# Patient Record
Sex: MALE | Race: WHITE | Employment: OTHER | URBAN - METROPOLITAN AREA
[De-identification: names, ages, dates, MRNs, and addresses within clinical notes are randomized per-mention and may not be internally consistent; named-entity substitution may affect disease eponyms.]

---

## 2017-01-12 ENCOUNTER — OFFICE VISIT (OUTPATIENT)
Dept: INTERNAL MEDICINE CLINIC | Age: 67
End: 2017-01-12

## 2017-01-12 VITALS
BODY MASS INDEX: 27.35 KG/M2 | OXYGEN SATURATION: 95 % | DIASTOLIC BLOOD PRESSURE: 80 MMHG | TEMPERATURE: 98.7 F | HEIGHT: 70 IN | RESPIRATION RATE: 18 BRPM | SYSTOLIC BLOOD PRESSURE: 136 MMHG | WEIGHT: 191 LBS | HEART RATE: 66 BPM

## 2017-01-12 DIAGNOSIS — G89.29 CHRONIC PAIN OF BOTH ANKLES: ICD-10-CM

## 2017-01-12 DIAGNOSIS — E11.9 CONTROLLED TYPE 2 DIABETES MELLITUS WITHOUT COMPLICATION, WITHOUT LONG-TERM CURRENT USE OF INSULIN (HCC): Primary | ICD-10-CM

## 2017-01-12 DIAGNOSIS — M25.571 CHRONIC PAIN OF BOTH ANKLES: ICD-10-CM

## 2017-01-12 DIAGNOSIS — Z23 NEED FOR SHINGLES VACCINE: ICD-10-CM

## 2017-01-12 DIAGNOSIS — E78.5 HYPERLIPIDEMIA WITH TARGET LDL LESS THAN 100: ICD-10-CM

## 2017-01-12 DIAGNOSIS — M25.572 CHRONIC PAIN OF BOTH ANKLES: ICD-10-CM

## 2017-01-12 NOTE — MR AVS SNAPSHOT
Visit Information Date & Time Provider Department Dept. Phone Encounter #  
 1/12/2017  3:40 PM [facility] [phone] [id number] Follow-up Instructions Return in about 4 months (around 5/12/2017) for Diabetes - Type 2. Upcoming Health Maintenance Date Due  
 MEDICARE YEARLY EXAM 1/5/2017 INFLUENZA AGE 9 TO ADULT 1/26/2017* HEMOGLOBIN A1C Q6M 2/15/2017* MICROALBUMIN Q1 2/15/2017* ZOSTER VACCINE AGE 60> 3/1/2017* FOOT EXAM Q1 6/24/2017 LIPID PANEL Q1 7/6/2017 EYE EXAM RETINAL OR DILATED Q1 9/6/2017 FOBT Q 1 YEAR AGE 50-75 1/12/2018 GLAUCOMA SCREENING Q2Y 9/6/2018 DTaP/Tdap/Td series (2 - Td) 5/21/2025 *Topic was postponed. The date shown is not the original due date. Allergies as of 1/12/2017  Review Complete On: 1/12/2017 By: Stone Gibson LPN Severity Noted Reaction Type Reactions Iodine  11/10/2011    Nausea Only Shrimp Current Immunizations  Reviewed on 1/5/2016 Name Date Influenza Vaccine (Quad) PF 1/5/2016  4:39 PM  
 Pneumococcal Conjugate (PCV-13) 1/27/2015 Pneumococcal Polysaccharide (PPSV-23) 9/24/2015  4:45 PM  
 Tdap 5/21/2015 Not reviewed this visit You Were Diagnosed With   
  
 Codes Comments Controlled type 2 diabetes mellitus without complication, without long-term current use of insulin ([facility]    -  Primary ICD-10-CM: E11.9 ICD-9-CM: 250.00 Hyperlipidemia with target LDL less than 100     ICD-10-CM: E78.5 ICD-9-CM: 272.4 Need for shingles vaccine     ICD-10-CM: G90 ICD-9-CM: V04.89 Chronic pain of both ankles     ICD-10-CM: M25.571, G89.29, M25.572 ICD-9-CM: 719.47, 338.29 Vitals BP Pulse Temp Resp Height(growth percentile) Weight(growth percentile) 136/80 (BP 1 Location: Left arm, BP Patient Position: Sitting) 66 98.7 °F (37.1 °C) (Oral) 18 5' 9.5\" (1.765 m) 191 lb (86.6 kg) SpO2 BMI Smoking Status 95% 27.8 kg/m2 Never Smoker Vitals History BMI and BSA Data Body Mass Index Body Surface Area  
 27.8 kg/m 2 2.06 m 2 Preferred Pharmacy Pharmacy Name Phone [address], [address] Your Updated Medication List  
  
   
This list is accurate as of: 17  4:31 PM.  Always use your most recent med list.  
  
  
  
  
 glucose blood VI test strips strip Commonly known as:  ONETOUCH ULTRA TEST Use to check BS BID. DM E11.9  
  
 metFORMIN 500 mg tablet Commonly known as:  GLUCOPHAGE  
TAKE 2 TABS BY MOUTH TWO (2) TIMES DAILY (WITH MEALS). multivitamin tablet Commonly known as:  ONE A DAY Take 1 Tab by mouth daily. pravastatin 20 mg tablet Commonly known as:  PRAVACHOL Take 1 Tab by mouth nightly. SITagliptin 100 mg tablet Commonly known as:  Arleta Moons Take 1 Tab by mouth daily. varicella zoster vacine live 19,400 unit/0.65 mL Susr injection Commonly known as:  varicella-zoster vacine live 1 Vial by SubCUTAneous route once for 1 dose. Prescriptions Printed Refills  
 varicella zoster vacine live (VARICELLA-ZOSTER VACINE LIVE) 19,400 unit/0.65 mL susr injection 0 Si Vial by SubCUTAneous route once for 1 dose. Class: Print Route: SubCUTAneous We Performed the Following  DIABETES FOOT EXAM [HM7 Custom] REFERRAL TO PODIATRY [REF90 Custom] Comments:  
 Please evaluate patient for bilat ankle discomfort with weight bearing; flat feet; he is Type 2 Diabetic. Follow-up Instructions Return in about 4 months (around 2017) for Diabetes - Type 2. To-Do List   
 02/15/2017 Lab:  HEMOGLOBIN A1C WITH EAG   
  
 02/15/2017 Lab:  METABOLIC PANEL, COMPREHENSIVE   
  
 02/15/2017 Lab:  MICROALBUMIN, UR, RAND W/ MICROALBUMIN/CREA RATIO   
  
 02/15/2017 Lab:  Mil[facility] Referral Information Referral ID Referred By Referred To  
  
 [id number]   
   [address] [place], [address] Visits Status Start Date End Date 10 New Request 1/12/17 1/12/18 If your referral has a status of pending review or denied, additional information will be sent to support the outcome of this decision. Introducing [facility] & HEALTH SERVICES! Dear Srikanth Haas: Thank you for requesting a [employer] account. Our records indicate that you already have an active [employer] account. You can access your account anytime at [email or web address] Did you know that you can access your hospital and ER discharge instructions at any time in [employer]? You can also review all of your test results from your hospital stay or ER visit. Additional Information If you have questions, please visit the Frequently Asked Questions section of the [employer] website at [email or web address] Remember, [employer] is NOT to be used for urgent needs. For medical emergencies, dial 911. Now available from your iPhone and Android! Please provide this summary of care documentation to your next provider. Your primary care clinician is listed as Ruby Tesfaye. If you have any questions after today's visit, please call [phone].

## 2017-01-12 NOTE — PROGRESS NOTES
77 yo male 3 mos overdue for 4 mo DM f/u. He has been on Januvia 100 mg since June of 2016 in addition to 1000 mg Metformin BID. He checks fasting sugars with readings from 120-160. He reports he eats [place] lot\" of nuts. He has flat feet w/ ankle discomfort with weight bearing. He never did go see Podiatry or Orthopedics. He and his wife just moved to a new house, and has't been getting exercise regularly. Sleep is good. Bowel fx is good. No exertional chest discomfort. PE: WNWD WM   Weight is down 3 lbs from last visit   /80   Heart - RRR   Lungs - clear   Diabetic foot exam:     Left: Reflexes 2+ in knee, absent in ankles     Vibratory sensation normal    Proprioception normal   Sharp/dull discrimination normal    Filament test normal sensation with micro filament   Pulse DP: 2+ (normal)   Pulse PT: 1+ (weak)   Deformities: Yes - Bilat prominent medial and lateral malleoli and flat arches  Right: Reflexes 2+ in knee, absent in ankle   Vibratory sensation normal   Proprioception normal   Sharp/dull discrimination normal   Filament test normal sensation with micro filament   Pulse DP: 2+ (normal)   Pulse PT: 1+ (weak)   Deformities: Yes - medial and lateral malleoli are prominent, flat arches    Imp: Type 2 DM   HLD   Bilat ankle/foot pain   Need for shingles vaccine    Plan: Rx for Zostavax   Referral to Podiatry   Order for fasting labs   See Dr. Atha Oppenheim in 4 mos  _____________________  Expected course of current diagnosed problem(s) as well as expected progression and possible complications, and desired follow up with provider are discussed with patient. Patient is encouraged to be back in touch with any questions or concerns. Patient expresses understanding of plan of care. Patient is given AVS which includes diagnoses, current medications, vitals.

## 2017-01-31 ENCOUNTER — OFFICE VISIT (OUTPATIENT)
Dept: INTERNAL MEDICINE CLINIC | Age: 67
End: 2017-01-31

## 2017-01-31 VITALS
SYSTOLIC BLOOD PRESSURE: 140 MMHG | BODY MASS INDEX: 28.48 KG/M2 | HEART RATE: 98 BPM | OXYGEN SATURATION: 100 % | TEMPERATURE: 101.4 F | RESPIRATION RATE: 18 BRPM | HEIGHT: 69 IN | WEIGHT: 192.3 LBS | DIASTOLIC BLOOD PRESSURE: 90 MMHG

## 2017-01-31 DIAGNOSIS — R50.9 FEBRILE RESPIRATORY ILLNESS: Primary | ICD-10-CM

## 2017-01-31 DIAGNOSIS — J98.9 FEBRILE RESPIRATORY ILLNESS: Primary | ICD-10-CM

## 2017-01-31 LAB
QUICKVUE INFLUENZA TEST: NEGATIVE
VALID INTERNAL CONTROL?: YES

## 2017-01-31 NOTE — PROGRESS NOTES
HPI:  Pb Decker is a 78y.o. year old male who returns to clinic today for an acute visit to discuss the problem(s) below:    Here for two days of URI symptoms - exposed to many sick contacts including grandchildren with fever. Reports intermittent mildly productive cough, congestion, fever, malaise. Denies sore throat, ear pain/discharge, sinus / nasal congestion. Prior to Admission medications    Medication Sig Start Date End Date Taking? Authorizing Provider   metFORMIN (GLUCOPHAGE) 500 mg tablet TAKE 2 TABS BY MOUTH TWO (2) TIMES DAILY (WITH MEALS). 12/7/16  Yes Elia Garrido MD   glucose blood VI test strips (ONETOUCH ULTRA TEST) strip Use to check BS BID. DM E11.9 12/7/16  Yes Elia Garrido MD   pravastatin (PRAVACHOL) 20 mg tablet Take 1 Tab by mouth nightly. 12/7/16  Yes Elia Garrido MD   SITagliptin (JANUVIA) 100 mg tablet Take 1 Tab by mouth daily. 12/7/16  Yes Elia Garrido MD   multivitamin (ONE A DAY) tablet Take 1 Tab by mouth daily. Yes Historical Provider          Allergies   Allergen Reactions    Iodine Nausea Only     Shrimp           ROS  See HPI      Physical Exam   Constitutional: He appears well-nourished. He does not appear ill. No distress. HENT:   Right Ear: Tympanic membrane is not erythematous. No middle ear effusion. Left Ear: Tympanic membrane is not erythematous. No middle ear effusion. Nose: No mucosal edema or rhinorrhea. Right sinus exhibits no maxillary sinus tenderness and no frontal sinus tenderness. Left sinus exhibits no maxillary sinus tenderness and no frontal sinus tenderness. Mouth/Throat: No oropharyngeal exudate or posterior oropharyngeal erythema. Neck: Neck supple. Cardiovascular: Normal rate, regular rhythm and normal heart sounds. No murmur heard. Pulmonary/Chest: Effort normal and breath sounds normal. He has no wheezes. He has no rales. Lymphadenopathy:     He has no cervical adenopathy.          Visit Vitals    /90 (BP 1 Location: Right arm, BP Patient Position: Sitting)    Pulse 98    Temp (!) 101.4 °F (38.6 °C) (Oral)    Resp 18    Ht 5' 9\" (1.753 m)    Wt 192 lb 4.8 oz (87.2 kg)    SpO2 100%    BMI 28.4 kg/m2         Assessment & Plan:  Scotty Harrington was seen today for cold symptoms. Diagnoses and all orders for this visit:    Febrile respiratory illness  Negative for flu. Discussed diagnosis & treatment options, most likely viral at this time, reviewed the importance of avoiding unnecessary antibiotic therapy, reviewed which OTC medications to use and avoid, expected time course for resolution & red flags were reviewed with him to RTC or notify me. -     AMB POC RAPID INFLUENZA TEST      Follow-up Disposition:  Return if symptoms worsen or fail to improve. Advised him to call back or return to office if symptoms worsen/change/persist.  Discussed expected course/resolution/complications of diagnosis in detail with patient. Medication risks/benefits/costs/interactions/alternatives discussed with patient. He was given an after visit summary which includes diagnoses, current medications, & vitals. He expressed understanding with the diagnosis and plan.

## 2017-01-31 NOTE — PATIENT INSTRUCTIONS
1. Mucinex (Guaifenesen) plain-Blue Box 600 mg. Take one twice daily with full glass water   Take for 10 days    2. Saline Nasal Spray - use liberally to flush post-nasal area; use as many times a day as desired. Keep spraying with head tilted back until you feel need to swallow    3. Drink lots of fluids (mainly water) to keep mucus thinner    4. If needed, for cough, we recommend Delsym cough syrup    5. Long acting antihistamine (Claritin/Loratadine, Allegra/Fexofenadine or Zyrtec/Ceterizine) is useful if allergy symptoms are also present    6. Decongestants should only be used for about 3 days. After that, they actually contribute to overdrying/thickening of the mucus, and can raise the BP and overstimulate the heart    7.  Steroid nasal spray (Nasacort AQ) - 2 sprays each nostril once daily; use with head in upright position

## 2017-01-31 NOTE — MR AVS SNAPSHOT
Visit Information Date & Time Provider Department Dept. Phone Encounter #  
 1/31/2017  9:00 AM Aura Segura MD [place] [facility] [facility] [phone] [id number] Upcoming Health Maintenance Date Due INFLUENZA AGE 9 TO ADULT 8/1/2016 MEDICARE YEARLY EXAM 1/5/2017 HEMOGLOBIN A1C Q6M 2/15/2017* MICROALBUMIN Q1 2/15/2017* ZOSTER VACCINE AGE 60> 3/1/2017* LIPID PANEL Q1 7/6/2017 EYE EXAM RETINAL OR DILATED Q1 9/6/2017 FOOT EXAM Q1 1/12/2018 FOBT Q 1 YEAR AGE 50-75 1/12/2018 GLAUCOMA SCREENING Q2Y 9/6/2018 DTaP/Tdap/Td series (2 - Td) 5/21/2025 *Topic was postponed. The date shown is not the original due date. Allergies as of 1/31/2017  Review Complete On: 1/31/2017 By: Dang [facility] Severity Noted Reaction Type Reactions Iodine  11/10/2011    Nausea Only Shrimp Current Immunizations  Reviewed on 1/31/2017 Name Date Influenza Vaccine (Quad) PF 1/5/2016  4:39 PM  
 Pneumococcal Conjugate (PCV-13) 1/27/2015 Pneumococcal Polysaccharide (PPSV-23) 9/24/2015  4:45 PM  
 Tdap 5/21/2015 Reviewed by Aura Segura MD on 1/31/2017 at  9:48 AM  
You Were Diagnosed With   
  
 Codes Comments Febrile respiratory illness    -  Primary ICD-10-CM: J98.9, R50.9 ICD-9-CM: 519.9 Vitals BP Pulse Temp Resp Height(growth percentile) Weight(growth percentile) 140/90 (BP 1 Location: Right arm, BP Patient Position: Sitting) 98 (!) 101.4 °F (38.6 °C) (Oral) 18 5' 9\" (1.753 m) 192 lb 4.8 oz (87.2 kg) SpO2 BMI Smoking Status 100% 28.4 kg/m2 Never Smoker Vitals History BMI and BSA Data Body Mass Index Body Surface Area  
 28.4 kg/m 2 2.06 m 2 Preferred Pharmacy Pharmacy Name Phone [address] [facility], [address] [place] [address] Your Updated Medication List  
  
   
This list is accurate as of: 1/31/17  9:51 AM.  Always use your most recent med list.  
  
  
  
  
 glucose blood VI test strips strip Commonly known as:  ONETOUCH ULTRA TEST Use to check BS BID. DM E11.9  
  
 metFORMIN 500 mg tablet Commonly known as:  GLUCOPHAGE  
TAKE 2 TABS BY MOUTH TWO (2) TIMES DAILY (WITH MEALS). multivitamin tablet Commonly known as:  ONE A DAY Take 1 Tab by mouth daily. pravastatin 20 mg tablet Commonly known as:  PRAVACHOL Take 1 Tab by mouth nightly. SITagliptin 100 mg tablet Commonly known as:  Saenz Reid Take 1 Tab by mouth daily. We Performed the Following AMB POC RAPID INFLUENZA TEST [87905 CPT(R)] Patient Instructions 1. Mucinex (Guaifenesen) plain-Blue Box 600 mg. Take one twice daily with full glass water Take for 10 days 2. Saline Nasal Spray - use liberally to flush post-nasal area; use as many times a day as desired. Keep spraying with head tilted back until you feel need to swallow 3. Drink lots of fluids (mainly water) to keep mucus thinner 4. If needed, for cough, we recommend Delsym cough syrup 5. Long acting antihistamine (Claritin/Loratadine, Allegra/Fexofenadine or Zyrtec/Ceterizine) is useful if allergy symptoms are also present 6. Decongestants should only be used for about 3 days. After that, they actually contribute to overdrying/thickening of the mucus, and can raise the BP and overstimulate the heart 7. Steroid nasal spray (Nasacort AQ) - 2 sprays each nostril once daily; use with head in upright position [facility] & HEALTH SERVICES! Dear Faizan Mesa: Thank you for requesting a [employer] account. Our records indicate that you already have an active [employer] account. You can access your account anytime at [email or web address] Did you know that you can access your hospital and ER discharge instructions at any time in [employer]? You can also review all of your test results from your hospital stay or ER visit. Additional Information If you have questions, please visit the Frequently Asked Questions section of the [employer] website at [email or web address] Remember, [employer] is NOT to be used for urgent needs. For medical emergencies, dial 911. Now available from your iPhone and Android! Please provide this summary of care documentation to your next provider. Your primary care clinician is listed as Mary Palomo. If you have any questions after today's visit, please call [phone].

## 2017-01-31 NOTE — PROGRESS NOTES
Shahriar Castellanos is a 78 y.o. male  Chief Complaint   Patient presents with    Cold Symptoms     Fever up to 101.2, chills, boday ache, and cough it started on sunday     1. Have you been to the ER, urgent care clinic since your last visit? Hospitalized since your last visit? No    2. Have you seen or consulted any other health care providers outside of the [address] [place] [address] since your last visit? Include any pap smears or colon screening.  No

## 2017-02-13 ENCOUNTER — OFFICE VISIT (OUTPATIENT)
Dept: INTERNAL MEDICINE CLINIC | Age: 67
End: 2017-02-13

## 2017-02-13 VITALS
RESPIRATION RATE: 20 BRPM | WEIGHT: 191 LBS | DIASTOLIC BLOOD PRESSURE: 82 MMHG | OXYGEN SATURATION: 98 % | HEART RATE: 80 BPM | SYSTOLIC BLOOD PRESSURE: 136 MMHG | TEMPERATURE: 98.1 F | HEIGHT: 69 IN | BODY MASS INDEX: 28.29 KG/M2

## 2017-02-13 DIAGNOSIS — R05.3 PERSISTENT COUGH FOR 3 WEEKS OR LONGER: Primary | ICD-10-CM

## 2017-02-13 RX ORDER — AZITHROMYCIN 250 MG/1
250 TABLET, FILM COATED ORAL SEE ADMIN INSTRUCTIONS
Qty: 6 TAB | Refills: 0 | Status: SHIPPED | OUTPATIENT
Start: 2017-02-13 | End: 2017-02-18

## 2017-02-13 NOTE — PROGRESS NOTES
79 yo male here at the urging of his wife due to continued cough after he was here on  for a URI. Due to his hx of Blastomycosis treated in , he wonders whether this cough may be related. He is producing some brownish as recently as a few days ago especially early AM.  Fever was present at the beginning of the illness only. He has not taken antibiotics during this illness. He is not currently taking any meds for his sxs. He is keeping hydrated. He had his flu shot Michael 15. In late , he was at his mother-in-law's  with lots of exposure to children and lots of people in general.  He became ill shortly thereafter. PE: WNWD WM w/ occasional dry cough   T - 98.1   Phar - diffusely inflammed   TMs - dull   Lungs - clear    Imp: Persistent cough   Hx exposure to sick children    Plan: Z-pack   Mucinex, Saline NS, Hydration  _____________________________  Expected course of current diagnosed problem(s) as well as expected progression and possible complications, and desired follow up with provider are discussed with patient. Patient is encouraged to be back in touch with any questions or concerns. Patient expresses understanding of plan of care. Patient is given AVS which includes diagnoses, current medications, vitals.

## 2017-02-13 NOTE — MR AVS SNAPSHOT
Visit Information Date & Time Provider Department Dept. Phone Encounter #  
 2/13/2017 10:00 AM [facility] [phone] Upcoming Health Maintenance Date Due  
 MEDICARE YEARLY EXAM 1/5/2017 HEMOGLOBIN A1C Q6M 2/15/2017* MICROALBUMIN Q1 2/15/2017* ZOSTER VACCINE AGE 60> 3/1/2017* LIPID PANEL Q1 7/6/2017 EYE EXAM RETINAL OR DILATED Q1 9/6/2017 FOOT EXAM Q1 1/12/2018 FOBT Q 1 YEAR AGE 50-75 1/12/2018 GLAUCOMA SCREENING Q2Y 9/6/2018 DTaP/Tdap/Td series (2 - Td) 5/21/2025 *Topic was postponed. The date shown is not the original due date. Allergies as of 2/13/2017  Review Complete On: 2/13/2017 By: Davey Pan NP Severity Noted Reaction Type Reactions Iodine  11/10/2011    Nausea Only Shrimp Current Immunizations  Reviewed on 1/31/2017 Name Date Influenza Vaccine (Quad) PF 1/5/2016  4:39 PM  
 Pneumococcal Conjugate (PCV-13) 1/27/2015 Pneumococcal Polysaccharide (PPSV-23) 9/24/2015  4:45 PM  
 Tdap 5/21/2015 Not reviewed this visit You Were Diagnosed With   
  
 Codes Comments Persistent cough for 3 weeks or longer    -  Primary ICD-10-CM: R05 ICD-9-CM: 881. 2 Vitals BP Pulse Temp Resp Height(growth percentile) Weight(growth percentile) 136/82 (BP 1 Location: Left arm, BP Patient Position: Sitting) 80 98.1 °F (36.7 °C) (Oral) 20 5' 9\" (1.753 m) 191 lb (86.6 kg) SpO2 BMI Smoking Status 98% 28.21 kg/m2 Never Smoker Vitals History BMI and BSA Data Body Mass Index Body Surface Area  
 28.21 kg/m 2 2.05 m 2 Preferred Pharmacy Pharmacy Name Phone [facility] [facility], [address] [place] [address] Your Updated Medication List  
  
   
This list is accurate as of: 2/13/17 11:20 AM.  Always use your most recent med list.  
  
  
  
  
 azithromycin 250 mg tablet Commonly known as:  Bennetta Plum Take 1 Tab by mouth See Admin Instructions for 5 days. glucose blood VI test strips strip Commonly known as:  ONETOUCH ULTRA TEST Use to check BS BID. DM E11.9  
  
 metFORMIN 500 mg tablet Commonly known as:  GLUCOPHAGE  
TAKE 2 TABS BY MOUTH TWO (2) TIMES DAILY (WITH MEALS). multivitamin tablet Commonly known as:  ONE A DAY Take 1 Tab by mouth daily. pravastatin 20 mg tablet Commonly known as:  PRAVACHOL Take 1 Tab by mouth nightly. SITagliptin 100 mg tablet Commonly known as:  Arleta Moons Take 1 Tab by mouth daily. Prescriptions Sent to Pharmacy Refills  
 azithromycin (ZITHROMAX) 250 mg tablet 0 Sig: Take 1 Tab by mouth See Admin Instructions for 5 days. Class: Normal  
 Pharmacy: [address] [place] [address] Ph #: [phone] Route: Oral  
  
Patient Instructions 1. Mucinex (Guaifenesen) plain-Blue Box 600 mg. Take one twice daily with full glass water Take for 10 days 2. Saline Nasal Spray - use liberally to flush post-nasal area; use as many times a day as desired. Keep spraying with head tilted back until you feel need to swallow 3. Drink lots of fluids (mainly water) to keep mucus thinner 4. If needed, for cough, we recommend Delsym cough syrup 5. Decongestants should only be used for about 3 days. After that, they actually contribute to overdrying/thickening of the mucus, and can raise the BP and overstimulate the heart Introducing [facility] & HEALTH SERVICES! Dear [facility]: Thank you for requesting a [employer] account. Our records indicate that you already have an active [employer] account. You can access your account anytime at [email or web address] Did you know that you can access your hospital and ER discharge instructions at any time in [employer]? You can also review all of your test results from your hospital stay or ER visit. Additional Information If you have questions, please visit the Frequently Asked Questions section of the [employer] website at [email or web address] Remember, [employer] is NOT to be used for urgent needs. For medical emergencies, dial 911. Now available from your iPhone and Android! Please provide this summary of care documentation to your next provider. Your primary care clinician is listed as [address] [place] [address]. If you have any questions after today's visit, please call [phone].

## 2017-02-13 NOTE — PATIENT INSTRUCTIONS
1. Mucinex (Guaifenesen) plain-Blue Box 600 mg. Take one twice daily with full glass water   Take for 10 days    2. Saline Nasal Spray - use liberally to flush post-nasal area; use as many times a day as desired. Keep spraying with head tilted back until you feel need to swallow    3. Drink lots of fluids (mainly water) to keep mucus thinner    4. If needed, for cough, we recommend Delsym cough syrup    5. Decongestants should only be used for about 3 days.   After that, they actually contribute to overdrying/thickening of the mucus, and can raise the BP and overstimulate the heart

## 2017-02-15 DIAGNOSIS — E78.5 HYPERLIPIDEMIA WITH TARGET LDL LESS THAN 100: ICD-10-CM

## 2017-02-15 DIAGNOSIS — E11.9 CONTROLLED TYPE 2 DIABETES MELLITUS WITHOUT COMPLICATION, WITHOUT LONG-TERM CURRENT USE OF INSULIN (HCC): ICD-10-CM

## 2017-04-06 LAB
ALBUMIN SERPL-MCNC: 4.7 G/DL (ref 3.6–4.8)
ALBUMIN/CREAT UR: 3.9 MG/G CREAT (ref 0–30)
ALBUMIN/GLOB SERPL: 2.1 {RATIO} (ref 1.2–2.2)
ALP SERPL-CCNC: 63 IU/L (ref 39–117)
ALT SERPL-CCNC: 19 IU/L (ref 0–44)
AST SERPL-CCNC: 18 IU/L (ref 0–40)
BILIRUB SERPL-MCNC: 0.6 MG/DL (ref 0–1.2)
BUN SERPL-MCNC: 11 MG/DL (ref 8–27)
BUN/CREAT SERPL: 13 (ref 10–24)
CALCIUM SERPL-MCNC: 9.8 MG/DL (ref 8.6–10.2)
CHLORIDE SERPL-SCNC: 100 MMOL/L (ref 96–106)
CHOLEST SERPL-MCNC: 166 MG/DL (ref 100–199)
CO2 SERPL-SCNC: 24 MMOL/L (ref 18–29)
CREAT SERPL-MCNC: 0.82 MG/DL (ref 0.76–1.27)
CREAT UR-MCNC: 222.6 MG/DL
EST. AVERAGE GLUCOSE BLD GHB EST-MCNC: 134 MG/DL
GLOBULIN SER CALC-MCNC: 2.2 G/DL (ref 1.5–4.5)
GLUCOSE SERPL-MCNC: 175 MG/DL (ref 65–99)
HBA1C MFR BLD: 6.3 % (ref 4.8–5.6)
HDL SERPL-SCNC: 29.7 UMOL/L
HDLC SERPL-MCNC: 46 MG/DL
INTERPRETATION, 910389: NORMAL
LDL SERPL QN: 20.7 NM
LDL SERPL-SCNC: 1097 NMOL/L
LDL SMALL SERPL-SCNC: 443 NMOL/L
LDLC SERPL CALC-MCNC: 100 MG/DL (ref 0–99)
LP-IR SCORE SERPL: 33
Lab: NORMAL
MICROALBUMIN UR-MCNC: 8.6 UG/ML
POTASSIUM SERPL-SCNC: 4.6 MMOL/L (ref 3.5–5.2)
PROT SERPL-MCNC: 6.9 G/DL (ref 6–8.5)
SODIUM SERPL-SCNC: 142 MMOL/L (ref 134–144)
TRIGL SERPL-MCNC: 102 MG/DL (ref 0–149)

## 2017-06-30 NOTE — TELEPHONE ENCOUNTER
Requested Prescriptions     Pending Prescriptions Disp Refills    metFORMIN (GLUCOPHAGE) 500 mg tablet 360 Tab 1     Sig: TAKE 2 TABS BY MOUTH TWO (2) TIMES DAILY (WITH MEALS).  SITagliptin (JANUVIA) 100 mg tablet 90 Tab 1     Sig: Take 1 Tab by mouth daily.      Patient now uses [facility] for his prescription drug coverage

## 2017-07-03 RX ORDER — METFORMIN HYDROCHLORIDE 500 MG/1
TABLET ORAL
Qty: 360 TAB | Refills: 0 | Status: SHIPPED | OUTPATIENT
Start: 2017-07-03 | End: 2017-07-11 | Stop reason: SDUPTHER

## 2017-07-03 NOTE — TELEPHONE ENCOUNTER
Last office visit 2/13/17  No upcoming appointments on file.  Left voicemail message requesting a call back to the office to schedule appointment to re establish care

## 2017-07-11 ENCOUNTER — TELEPHONE (OUTPATIENT)
Dept: INTERNAL MEDICINE CLINIC | Age: 67
End: 2017-07-11

## 2017-07-11 RX ORDER — METFORMIN HYDROCHLORIDE 500 MG/1
TABLET ORAL
Qty: 120 TAB | Refills: 0 | Status: SHIPPED | OUTPATIENT
Start: 2017-07-11 | End: 2017-07-12 | Stop reason: SDUPTHER

## 2017-07-11 NOTE — TELEPHONE ENCOUNTER
Pt called stating that he has not received his mail order and it will take about another 10 days for them to send it. Pt would like a 2 week supply of MetFormin and Januvia sent to [facility] on file.  Pt can be reached at [address]

## 2017-07-11 NOTE — TELEPHONE ENCOUNTER
Patient states he needs a 10 day supply of metformin and januvia, until he receives his mail order. Please send to [address] on file.

## 2017-07-12 ENCOUNTER — TELEPHONE (OUTPATIENT)
Dept: INTERNAL MEDICINE CLINIC | Age: 67
End: 2017-07-12

## 2017-07-12 RX ORDER — METFORMIN HYDROCHLORIDE 500 MG/1
TABLET ORAL
Qty: 40 TAB | Refills: 0 | OUTPATIENT
Start: 2017-07-12 | End: 2017-09-25 | Stop reason: SDUPTHER

## 2017-07-12 NOTE — TELEPHONE ENCOUNTER
Patient called earlier today to get a 10 day prescription for his metformin and [place] [facility] [place]. He is waiting for his mail order meds to arrive.  [facility] still does not have scripts. Orders Placed This Encounter    metFORMIN (GLUCOPHAGE) 500 mg tablet     Sig: TAKE 2 TABS BY MOUTH TWO (2) TIMES DAILY (WITH MEALS). need to establish with new primary care provider for further refills     Dispense:  40 Tab     Refill:  0    SITagliptin (JANUVIA) 100 mg tablet     Sig: Take 1 Tab by mouth daily.  Need to establish with new primary care provider for further refills     Dispense:  10 Tab     Refill:  0      Both called to his pharmacy

## 2017-09-25 ENCOUNTER — OFFICE VISIT (OUTPATIENT)
Dept: INTERNAL MEDICINE CLINIC | Age: 67
End: 2017-09-25

## 2017-09-25 VITALS
HEIGHT: 69 IN | SYSTOLIC BLOOD PRESSURE: 120 MMHG | BODY MASS INDEX: 28.29 KG/M2 | WEIGHT: 191 LBS | TEMPERATURE: 98.5 F | RESPIRATION RATE: 18 BRPM | HEART RATE: 70 BPM | DIASTOLIC BLOOD PRESSURE: 70 MMHG | OXYGEN SATURATION: 96 %

## 2017-09-25 DIAGNOSIS — Z12.5 PROSTATE CANCER SCREENING: ICD-10-CM

## 2017-09-25 DIAGNOSIS — E11.9 CONTROLLED TYPE 2 DIABETES MELLITUS WITHOUT COMPLICATION, WITHOUT LONG-TERM CURRENT USE OF INSULIN (HCC): Primary | ICD-10-CM

## 2017-09-25 DIAGNOSIS — Z00.00 WELCOME TO MEDICARE PREVENTIVE VISIT: ICD-10-CM

## 2017-09-25 DIAGNOSIS — Z13.31 SCREENING FOR DEPRESSION: ICD-10-CM

## 2017-09-25 DIAGNOSIS — E78.5 HYPERLIPIDEMIA WITH TARGET LDL LESS THAN 100: ICD-10-CM

## 2017-09-25 DIAGNOSIS — E78.00 PURE HYPERCHOLESTEROLEMIA: ICD-10-CM

## 2017-09-25 DIAGNOSIS — Z23 ENCOUNTER FOR IMMUNIZATION: ICD-10-CM

## 2017-09-25 DIAGNOSIS — Z86.19 H/O BLASTOMYCOSIS: ICD-10-CM

## 2017-09-25 DIAGNOSIS — Z13.6 SCREENING FOR ABDOMINAL AORTIC ANEURYSM: ICD-10-CM

## 2017-09-25 RX ORDER — METFORMIN HYDROCHLORIDE 500 MG/1
TABLET ORAL
Qty: 180 TAB | Refills: 3 | Status: SHIPPED | OUTPATIENT
Start: 2017-09-25 | End: 2018-04-13 | Stop reason: SDUPTHER

## 2017-09-25 RX ORDER — OMEPRAZOLE 40 MG/1
40 CAPSULE, DELAYED RELEASE ORAL AS NEEDED
Refills: 0 | COMMUNITY
Start: 2017-09-06 | End: 2018-05-28

## 2017-09-25 NOTE — PATIENT INSTRUCTIONS
Vaccine Information Statement    Influenza (Flu) Vaccine (Inactivated or Recombinant): What you need to know    Many Vaccine Information Statements are available in Bulgarian and other languages. See www.immunize.org/vis  Hojas de Información Sobre Vacunas están disponibles en Español y en muchos otros idiomas. Visite www.immunize.org/vis    1. Why get vaccinated? Influenza (flu) is a contagious disease that spreads around the [place] every year, usually between October and May. Flu is caused by influenza viruses, and is spread mainly by coughing, sneezing, and close contact. Anyone can get flu. Flu strikes suddenly and can last several days. Symptoms vary by age, but can include:   fever/chills   sore throat   muscle aches   fatigue   cough   headache    runny or stuffy nose    Flu can also lead to pneumonia and blood infections, and cause diarrhea and seizures in children. If you have a medical condition, such as heart or lung disease, flu can make it worse. Flu is more dangerous for some people. Infants and young children, people 72years of age and older, pregnant women, and people with certain health conditions or a weakened immune system are at greatest risk. Each year thousands of people in the [facility] die from flu, and many more are hospitalized. Flu vaccine can:   keep you from getting flu,   make flu less severe if you do get it, and   keep you from spreading flu to your family and other people. 2. Inactivated and recombinant flu vaccines    A dose of flu vaccine is recommended every flu season. Children 6 months through 6years of age may need two doses during the same flu season. Everyone else needs only one dose each flu season.        Some inactivated flu vaccines contain a very small amount of a mercury-based preservative called thimerosal. Studies have not shown thimerosal in vaccines to be harmful, but flu vaccines that do not contain thimerosal are available. There is no live flu virus in flu shots. They cannot cause the flu. There are many flu viruses, and they are always changing. Each year a new flu vaccine is made to protect against three or four viruses that are likely to cause disease in the upcoming flu season. But even when the vaccine doesnt exactly match these viruses, it may still provide some protection    Flu vaccine cannot prevent:   flu that is caused by a virus not covered by the vaccine, or   illnesses that look like flu but are not. It takes about 2 weeks for protection to develop after vaccination, and protection lasts through the flu season. 3. Some people should not get this vaccine    Tell the person who is giving you the vaccine:     If you have any severe, life-threatening allergies. If you ever had a life-threatening allergic reaction after a dose of flu vaccine, or have a severe allergy to any part of this vaccine, you may be advised not to get vaccinated. Most, but not all, types of flu vaccine contain a small amount of egg protein.  If you ever had Guillain-Barré Syndrome (also called GBS). Some people with a history of GBS should not get this vaccine. This should be discussed with your doctor.  If you are not feeling well. It is usually okay to get flu vaccine when you have a mild illness, but you might be asked to come back when you feel better. 4. Risks of a vaccine reaction    With any medicine, including vaccines, there is a chance of reactions. These are usually mild and go away on their own, but serious reactions are also possible. Most people who get a flu shot do not have any problems with it.      Minor problems following a flu shot include:    soreness, redness, or swelling where the shot was given     hoarseness   sore, red or itchy eyes   cough   fever   aches   headache   itching   fatigue  If these problems occur, they usually begin soon after the shot and last 1 or 2 days. More serious problems following a flu shot can include the following:     There may be a small increased risk of Guillain-Barré Syndrome (GBS) after inactivated flu vaccine. This risk has been estimated at 1 or 2 additional cases per million people vaccinated. This is much lower than the risk of severe complications from flu, which can be prevented by flu vaccine.  Young children who get the flu shot along with pneumococcal vaccine (PCV13) and/or DTaP vaccine at the same time might be slightly more likely to have a seizure caused by fever. Ask your doctor for more information. Tell your doctor if a child who is getting flu vaccine has ever had a seizure. Problems that could happen after any injected vaccine:      People sometimes faint after a medical procedure, including vaccination. Sitting or lying down for about 15 minutes can help prevent fainting, and injuries caused by a fall. Tell your doctor if you feel dizzy, or have vision changes or ringing in the ears.  Some people get severe pain in the shoulder and have difficulty moving the arm where a shot was given. This happens very rarely.  Any medication can cause a severe allergic reaction. Such reactions from a vaccine are very rare, estimated at about 1 in a million doses, and would happen within a few minutes to a few hours after the vaccination. As with any medicine, there is a very remote chance of a vaccine causing a serious injury or death. The safety of vaccines is always being monitored. For more information, visit: www.cdc.gov/vaccinesafety/    5. What if there is a serious reaction? What should I look for?  Look for anything that concerns you, such as signs of a severe allergic reaction, very high fever, or unusual behavior.     Signs of a severe allergic reaction can include hives, swelling of the face and throat, difficulty breathing, a fast heartbeat, dizziness, and weakness  usually within a few minutes to a few hours after the vaccination. What should I do?  If you think it is a severe allergic reaction or other emergency that cant wait, call 9-1-1 and get the person to the nearest hospital. Otherwise, call your doctor.  Reactions should be reported to the Vaccine Adverse Event Reporting System (VAERS). Your doctor should file this report, or you can do it yourself through  the VAERS web site at www.vaers. [facility].gov, or by calling 0-645.805.2432. VAERS does not give medical advice. 6. The National Vaccine Injury Compensation Program    The [facility] Vaccine Injury Compensation Program (VICP) is a federal program that was created to compensate people who may have been injured by certain vaccines. Persons who believe they may have been injured by a vaccine can learn about the program and about filing a claim by calling 7-249.339.9817 or visiting the [employer] website at www.[facility].gov/vaccinecompensation. There is a time limit to file a claim for compensation. 7. How can I learn more?  Ask your healthcare provider. He or she can give you the vaccine package insert or suggest other sources of information.  Call your local or state health department.  Contact the Centers for Disease Control and Prevention (CDC):  - Call 0-607.404.8129 (1-800-CDC-INFO) or  - Visit CDCs website at www.cdc.gov/flu    Vaccine Information Statement   Inactivated Influenza Vaccine   8/7/2015  42 NORBERTO Moore 113EH-30    Department of Health and Human Services  Centers for Disease Control and Prevention    Office Use Only

## 2017-09-25 NOTE — PROGRESS NOTES
Chief Complaint   Patient presents with    Welcome To Medicare    Establish Care        1. Have you been to the ER, urgent care clinic since your last visit? No  Hospitalized since your last visit? No    2. Have you seen or consulted any other health care providers outside of the [address] [place] [address] since your last visit? No Include any pap smears or colon screening.  No

## 2017-09-26 ENCOUNTER — HOSPITAL ENCOUNTER (OUTPATIENT)
Dept: GENERAL RADIOLOGY | Age: 67
Discharge: HOME | End: 2017-09-26
Attending: INTERNAL MEDICINE
Payer: MEDICARE

## 2017-09-26 DIAGNOSIS — Z86.19 H/O BLASTOMYCOSIS: ICD-10-CM

## 2017-09-26 PROCEDURE — 71020 XR CHEST PA LAT: CPT

## 2017-09-26 NOTE — PROGRESS NOTES
The Xray was normal.  There was some granulomas which were likely the scars from your previous infection in the right mid lung.   Message sent to patient via FightMe:  September 26, 2017

## 2017-10-02 ENCOUNTER — HOSPITAL ENCOUNTER (OUTPATIENT)
Dept: VASCULAR SURGERY | Age: 67
Discharge: HOME | End: 2017-10-02
Attending: INTERNAL MEDICINE
Payer: MEDICARE

## 2017-10-02 ENCOUNTER — HOSPITAL ENCOUNTER (OUTPATIENT)
Dept: ULTRASOUND IMAGING | Age: 67
Discharge: HOME | End: 2017-10-02
Attending: INTERNAL MEDICINE
Payer: MEDICARE

## 2017-10-02 DIAGNOSIS — Z13.6 SCREENING FOR ABDOMINAL AORTIC ANEURYSM: ICD-10-CM

## 2017-10-02 DIAGNOSIS — E78.5 HYPERLIPIDEMIA WITH TARGET LDL LESS THAN 100: ICD-10-CM

## 2017-10-02 DIAGNOSIS — E11.9 CONTROLLED TYPE 2 DIABETES MELLITUS WITHOUT COMPLICATION, WITHOUT LONG-TERM CURRENT USE OF INSULIN (HCC): ICD-10-CM

## 2017-10-02 PROCEDURE — 93880 EXTRACRANIAL BILAT STUDY: CPT

## 2017-10-02 PROCEDURE — 76706 US ABDL AORTA SCREEN AAA: CPT

## 2017-10-02 NOTE — PROCEDURES
[facility]  *** FINAL REPORT ***    Name: Carmen Orantes  MRN: [id number]    Outpatient  : 07 Oct 1950  HIS Order #: [id number]  [address] [facility] Visit #: [id number]  Date: 02 Oct 2017    TYPE OF TEST: Cerebrovascular Duplex    REASON FOR TEST  Carotid bruit    Right Carotid:-             Proximal               Mid                 Distal  cm/s  Systolic  Diastolic  Systolic  Diastolic  Systolic  Diastolic  CCA:     27.0      19.0                            64.0      19.0  Bulb:  ECA:     99.0      18.0  ICA:     87.0      30.0                            68.0      29.0  ICA/CCA:  1.4       1.6    ICA Stenosis:    Right Vertebral:-  Finding: Antegrade  Sys:       39.0  Sri:       14.0    Right Subclavian:    Left Carotid:-            Proximal                Mid                 Distal  cm/s  Systolic  Diastolic  Systolic  Diastolic  Systolic  Diastolic  CCA:     14.1      19.0                            56.0      16.0  Bulb:  ECA:     85.0      14.0  ICA:     42.0      18.0                            65.0      29.0  ICA/CCA:  0.8       1.1    ICA Stenosis:    Left Vertebral:-  Finding: Antegrade  Sys:       48.0  Sri:       16.0    Left Subclavian:    INTERPRETATION/FINDINGS  PROCEDURE:  Color duplex ultrasound imaging of extracranial  cerebrovascular arteries. FINDINGS:       Right:  Internal carotid velocity is within normal limits. There   is narrowing of the internal carotid flow channel on color Doppler  imaging and  non-calcific plaque on B-mode imaging, consistent with  less than 50 percent stenosis (lower portion of the 0 to 49 percent  range). The common and external carotid arteries are patent and  without evidence of hemodynamically significant stenosis. Left:  Internal carotid velocity is within normal limits.   There  is narrowing of the internal carotid flow channel on color Doppler  imaging and  non-calcific plaque on B-mode imaging, consistent with  less than 50 percent stenosis (lower portion of the 0 to 49 percent  range). The common and external carotid arteries are patent and  without evidence of hemodynamically significant stenosis. IMPRESSION:  Consistent with less than 50% stenosis of the right  internal carotid and less than 50% stenosis of the left internal  carotid. Vertebrals are patent with antegrade flow. ADDITIONAL COMMENTS    I have personally reviewed the data relevant to the interpretation of  this  study. TECHNOLOGIST: Celsa Damon.  Narinder Fernando  Signed: 10/02/2017 09:00 AM    PHYSICIAN: Adeline Birch MD  Signed: 10/03/2017 07:04 AM

## 2017-10-03 NOTE — PROGRESS NOTES
There was normal blood flow for your carotid arteries. There was some plaque but minimal.  However at our next visit, we should consider adding a statin since you are a diabetic. If you are not on an aspirin, please start a baby aspirin 81 mg once a day in the morning with food.   Message sent to patient via Tylr Mobile:  October 3, 2017

## 2017-10-16 ENCOUNTER — HOSPITAL ENCOUNTER (OUTPATIENT)
Dept: LAB | Age: 67
Discharge: HOME | End: 2017-10-16
Payer: MEDICARE

## 2017-10-16 PROCEDURE — 80061 LIPID PANEL: CPT

## 2017-10-16 PROCEDURE — 83036 HEMOGLOBIN GLYCOSYLATED A1C: CPT

## 2017-10-16 PROCEDURE — 84153 ASSAY OF PSA TOTAL: CPT

## 2017-10-16 PROCEDURE — 85025 COMPLETE CBC W/AUTO DIFF WBC: CPT

## 2017-10-16 PROCEDURE — 82043 UR ALBUMIN QUANTITATIVE: CPT

## 2017-10-16 PROCEDURE — 36415 COLL VENOUS BLD VENIPUNCTURE: CPT

## 2017-10-16 PROCEDURE — 80053 COMPREHEN METABOLIC PANEL: CPT

## 2017-10-16 PROCEDURE — 81001 URINALYSIS AUTO W/SCOPE: CPT

## 2017-10-16 PROCEDURE — 84443 ASSAY THYROID STIM HORMONE: CPT

## 2017-10-17 LAB
ALBUMIN SERPL-MCNC: 4.5 G/DL (ref 3.6–4.8)
ALBUMIN/CREAT UR: 2.6 MG/G CREAT (ref 0–30)
ALBUMIN/GLOB SERPL: 1.7 {RATIO} (ref 1.2–2.2)
ALP SERPL-CCNC: 70 IU/L (ref 39–117)
ALT SERPL-CCNC: 18 IU/L (ref 0–44)
APPEARANCE UR: CLEAR
AST SERPL-CCNC: 17 IU/L (ref 0–40)
BACTERIA #/AREA URNS HPF: NORMAL /[HPF]
BASOPHILS # BLD AUTO: 0.1 X10E3/UL (ref 0–0.2)
BASOPHILS NFR BLD AUTO: 1 %
BILIRUB SERPL-MCNC: 0.3 MG/DL (ref 0–1.2)
BILIRUB UR QL STRIP: NEGATIVE
BUN SERPL-MCNC: 17 MG/DL (ref 8–27)
BUN/CREAT SERPL: 22 (ref 10–24)
CALCIUM SERPL-MCNC: 9.7 MG/DL (ref 8.6–10.2)
CASTS URNS QL MICRO: NORMAL /LPF
CHLORIDE SERPL-SCNC: 99 MMOL/L (ref 96–106)
CHOLEST SERPL-MCNC: 172 MG/DL (ref 100–199)
CO2 SERPL-SCNC: 25 MMOL/L (ref 18–29)
COLOR UR: YELLOW
CREAT SERPL-MCNC: 0.79 MG/DL (ref 0.76–1.27)
CREAT UR-MCNC: 156 MG/DL
EOSINOPHIL # BLD AUTO: 0.3 X10E3/UL (ref 0–0.4)
EOSINOPHIL NFR BLD AUTO: 5 %
EPI CELLS #/AREA URNS HPF: NORMAL /HPF
ERYTHROCYTE [DISTWIDTH] IN BLOOD BY AUTOMATED COUNT: 12.6 % (ref 12.3–15.4)
EST. AVERAGE GLUCOSE BLD GHB EST-MCNC: 140 MG/DL
GLOBULIN SER CALC-MCNC: 2.6 G/DL (ref 1.5–4.5)
GLUCOSE SERPL-MCNC: 175 MG/DL (ref 65–99)
GLUCOSE UR QL: NEGATIVE
HBA1C MFR BLD: 6.5 % (ref 4.8–5.6)
HCT VFR BLD AUTO: 44.5 % (ref 37.5–51)
HDLC SERPL-MCNC: 43 MG/DL
HGB BLD-MCNC: 15.3 G/DL (ref 12.6–17.7)
HGB UR QL STRIP: NEGATIVE
IMM GRANULOCYTES # BLD: 0 X10E3/UL (ref 0–0.1)
IMM GRANULOCYTES NFR BLD: 0 %
INTERPRETATION, 910389: NORMAL
KETONES UR QL STRIP: NEGATIVE
LDLC SERPL CALC-MCNC: 111 MG/DL (ref 0–99)
LEUKOCYTE ESTERASE UR QL STRIP: NEGATIVE
LYMPHOCYTES # BLD AUTO: 1.2 X10E3/UL (ref 0.7–3.1)
LYMPHOCYTES NFR BLD AUTO: 19 %
Lab: NORMAL
MCH RBC QN AUTO: 31.8 PG (ref 26.6–33)
MCHC RBC AUTO-ENTMCNC: 34.4 G/DL (ref 31.5–35.7)
MCV RBC AUTO: 93 FL (ref 79–97)
MICRO URNS: NORMAL
MICRO URNS: NORMAL
MICROALBUMIN UR-MCNC: 4 UG/ML
MONOCYTES # BLD AUTO: 0.4 X10E3/UL (ref 0.1–0.9)
MONOCYTES NFR BLD AUTO: 7 %
MUCOUS THREADS URNS QL MICRO: PRESENT
NEUTROPHILS # BLD AUTO: 4.3 X10E3/UL (ref 1.4–7)
NEUTROPHILS NFR BLD AUTO: 68 %
NITRITE UR QL STRIP: NEGATIVE
PH UR STRIP: 6 [PH] (ref 5–7.5)
PLATELET # BLD AUTO: 210 X10E3/UL (ref 150–379)
POTASSIUM SERPL-SCNC: 4.3 MMOL/L (ref 3.5–5.2)
PROT SERPL-MCNC: 7.1 G/DL (ref 6–8.5)
PROT UR QL STRIP: NEGATIVE
PSA SERPL-MCNC: 1.2 NG/ML (ref 0–4)
RBC # BLD AUTO: 4.81 X10E6/UL (ref 4.14–5.8)
RBC #/AREA URNS HPF: NORMAL /HPF
SODIUM SERPL-SCNC: 139 MMOL/L (ref 134–144)
SP GR UR: 1.02 (ref 1–1.03)
TRIGL SERPL-MCNC: 90 MG/DL (ref 0–149)
TSH SERPL DL<=0.005 MIU/L-ACNC: 1.52 UIU/ML (ref 0.45–4.5)
URINALYSIS REFLEX, 377202: NORMAL
UROBILINOGEN UR STRIP-MCNC: 0.2 MG/DL (ref 0.2–1)
VLDLC SERPL CALC-MCNC: 18 MG/DL (ref 5–40)
WBC # BLD AUTO: 6.4 X10E3/UL (ref 3.4–10.8)
WBC #/AREA URNS HPF: NORMAL /HPF

## 2017-10-18 NOTE — PROGRESS NOTES
The cholesterol is a little high. Are you taking the pravachol 20 mg now? We need to increase your dose a little to 40 mg since your carotid shows some plaque disease. Let me know and I will send it in to your pharmacy.   Message sent to patient via Clipboard:  October 17, 2017

## 2018-02-21 NOTE — TELEPHONE ENCOUNTER
Last office visit- 9/225/17  Next office visit- 4/5/18  Last refill-    Requested Prescriptions     Pending Prescriptions Disp Refills    glucose blood VI test strips (ONETOUCH ULTRA TEST) strip 200 Strip 1     Sig: Use to check BS BID.  DM E11.9

## 2018-04-10 ENCOUNTER — OFFICE VISIT (OUTPATIENT)
Dept: INTERNAL MEDICINE CLINIC | Age: 68
End: 2018-04-10

## 2018-04-10 DIAGNOSIS — E11.9 CONTROLLED TYPE 2 DIABETES MELLITUS WITHOUT COMPLICATION, WITHOUT LONG-TERM CURRENT USE OF INSULIN (HCC): Primary | ICD-10-CM

## 2018-04-10 DIAGNOSIS — R03.0 BORDERLINE SYSTOLIC HTN: ICD-10-CM

## 2018-04-10 DIAGNOSIS — E78.5 HYPERLIPIDEMIA WITH TARGET LDL LESS THAN 100: ICD-10-CM

## 2018-04-10 RX ORDER — ASPIRIN 325 MG
325 TABLET, DELAYED RELEASE (ENTERIC COATED) ORAL
COMMUNITY
Start: 2017-10-09 | End: 2018-05-28

## 2018-04-10 RX ORDER — PRAVASTATIN SODIUM 20 MG/1
1 TABLET ORAL
COMMUNITY
Start: 2016-12-07 | End: 2018-05-21 | Stop reason: SDUPTHER

## 2018-04-10 NOTE — PROGRESS NOTES
Diabetes (6 month follow up)       HPI:  Braxton Gonzalez is a 75y.o. year old male who is here for a follow up visit. He was last seen by me on Visit date not found. He reports the following:    [facility]- ankle surgery  Getting PT    Taking pravachol 20 mg. On 325 mg aspirin daily from ankle surgery- will switch to baby aspirin 81 mg later. Was not able to do labs- will get blood sugar checked. No readings today    GERD    Patient has history of acid reflux. Tolerates medications and tries to use PRN. He denies acid related chest pain, early satiety, difficulty swallowing, or unexpected weight loss. Assessment and Plan        1. Controlled type 2 diabetes mellitus without complication, without long-term current use of insulin ([facility])  Check a1c today. Pt compliant with diet. - pravastatin (PRAVACHOL) 20 mg tablet; Take 1 Tab by mouth.  - aspirin delayed-release 325 mg tablet; Take 325 mg by mouth.  - CBC WITH AUTOMATED DIFF  - LIPID PANEL  - METABOLIC PANEL, COMPREHENSIVE  - HEMOGLOBIN A1C WITH EAG    2. Hyperlipidemia with target LDL less than 100  The nature of cardiac risk has been fully discussed with this patient. I have made him aware of his LDL target goal given his cardiovascular risk analysis. I have discussed the appropriate diet. The need for lifelong compliance in order to reduce risk is stressed. A regular exercise program is recommended to help achieve and maintain normal body weight, fitness and improve lipid balance. The risks and benefits of medications were discussed. Last cholesterol labs reviewed with patient. Patient made aware to get liver checked every 6 months. Continue with  pravastatin    3. Borderline systolic HTN  Get BP machine. New diagnosis today. Took a lot of NSAIDS.           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  aspirin delayed-release 325 mg tablet Take 325 mg by mouth.  glucose blood VI test strips (ONETOUCH ULTRA TEST) strip Use to check BS BID. DM E11.9 200 Strip 1    SITagliptin (JANUVIA) 100 mg tablet Take 1 Tab by mouth daily. 90 Tab 3    metFORMIN (GLUCOPHAGE) 500 mg tablet TAKE 2 TABS BY MOUTH TWO (2) TIMES DAILY (WITH MEALS) 180 Tab 3    multivitamin (ONE A DAY) tablet Take 1 Tab by mouth daily.  omeprazole (PRILOSEC) 40 mg capsule Take 40 mg by mouth daily. 0     No facility-administered encounter medications on file as of 4/10/2018. Allergies   Allergen Reactions    Iodine Nausea Only     Shrimp        Social History     Social History    Marital status:      Spouse name: N/A    Number of children: N/A    Years of education: N/A     Occupational History    defense [employer]     Social History Main Topics    Smoking status: Never Smoker    Smokeless tobacco: Never Used    Alcohol use 0.0 oz/week      Comment: rare to occasional    Drug use: No    Sexual activity: Yes     Partners: Female     Other Topics Concern    Not on file     Social History Narrative        family history includes Cancer in his father; Dementia in his mother; Diabetes in his father and mother; Psychiatric Disorder in his mother; Stroke in his father. Review of Systems   Constitutional: Negative for weight loss. Eyes: Negative for blurred vision. Respiratory: Negative for shortness of breath. Cardiovascular: Negative for chest pain. Gastrointestinal: Negative for abdominal pain. Genitourinary: Negative for dysuria and frequency. Skin: Negative for rash. Neurological: Negative for dizziness, focal weakness, weakness and headaches. Endo/Heme/Allergies: Negative for environmental allergies. Does not bruise/bleed easily. Physical Exam   Constitutional: He appears well-developed and well-nourished. He is active. Non-toxic appearance. He does not have a sickly appearance. He does not appear ill. No distress. Eyes: Conjunctivae are normal. Right eye exhibits no discharge. Cardiovascular: Normal rate, regular rhythm, S1 normal, S2 normal, normal heart sounds and normal pulses. Exam reveals no gallop and no friction rub. Pulmonary/Chest: Effort normal and breath sounds normal. No respiratory distress. Abdominal: Soft. Bowel sounds are normal.   Musculoskeletal: He exhibits no edema or deformity. Neurological: He is alert. Skin: Skin is warm and dry. No rash noted. No pallor. Psychiatric: He has a normal mood and affect. His behavior is normal.   Vitals reviewed. Ortho Exam      Orders Placed This Encounter    CBC WITH AUTOMATED DIFF    LIPID PANEL    METABOLIC PANEL, COMPREHENSIVE    HEMOGLOBIN A1C WITH EAG    pravastatin (PRAVACHOL) 20 mg tablet     Sig: Take 1 Tab by mouth.  aspirin delayed-release 325 mg tablet     Sig: Take 325 mg by mouth. I have reviewed the patient's medical history in detail and updated the computerized patient record. We had a prolonged discussion about these complex clinical issues and went over the various important aspects to consider. All questions were answered.      Advised him to call back or return to office if symptoms do not improve, change in nature, or persist.    He was given an after visit summary or informed of [employer] which includes patient instructions, diagnoses, current medications, & vitals. He expressed understanding with the diagnosis and plan.

## 2018-04-10 NOTE — MR AVS SNAPSHOT
[address] [facility], [address] [place] [address] 
[phone] Patient: Chelsie White MRN: [id number] :1950 Visit Information Date & Time Provider Department Dept. Phone Encounter #  
 4/10/2018  3:30 PM MD oNlan Martino [facility] [phone] [id number] Follow-up Instructions Return in about 2 weeks (around 2018) for recheck blood pressure. Upcoming Health Maintenance Date Due ZOSTER VACCINE AGE 60> 2009 FOOT EXAM Q1 2018 FOBT Q 1 YEAR AGE 50-75 2018 HEMOGLOBIN A1C Q6M 2018 MEDICARE YEARLY EXAM 2018 EYE EXAM RETINAL OR DILATED Q1 2018 MICROALBUMIN Q1 10/16/2018 LIPID PANEL Q1 10/16/2018 GLAUCOMA SCREENING Q2Y 2019 DTaP/Tdap/Td series (2 - Td) 2025 Allergies as of 4/10/2018  Review Complete On: 4/10/2018 By: Isaias Avery LPN Severity Noted Reaction Type Reactions Iodine  11/10/2011    Nausea Only Shrimp Current Immunizations  Reviewed on 2017 Name Date Influenza High Dose Vaccine PF 2017 Influenza Vaccine (Quad) PF 2016  4:39 PM  
 Pneumococcal Conjugate (PCV-13) 2015 Pneumococcal Polysaccharide (PPSV-23) 2015  4:45 PM  
 Tdap 2015 Not reviewed this visit You Were Diagnosed With   
  
 Codes Comments Controlled type 2 diabetes mellitus without complication, without long-term current use of insulin [facility]    -  Primary ICD-10-CM: E11.9 ICD-9-CM: 250.00 Vitals BP Pulse Temp Resp Height(growth percentile) Weight(growth percentile) (!) 170/100 (BP 1 Location: Left arm, BP Patient Position: Sitting) 69 98.2 °F (36.8 °C) (Oral) 18 5' 9\" (1.753 m) 191 lb 12.8 oz (87 kg) SpO2 BMI Smoking Status 98% 28.32 kg/m2 Never Smoker Vitals History BMI and BSA Data  Body Mass Index Body Surface Area  
 28.32 kg/m 2 2.06 m 2  
  
  
 Preferred Pharmacy Pharmacy Name Phone [id number] [id number] [phone] Your Updated Medication List  
  
   
This list is accurate as of 4/10/18  4:03 PM.  Always use your most recent med list.  
  
  
  
  
 aspirin delayed-release 325 mg tablet Take 325 mg by mouth. glucose blood VI test strips strip Commonly known as:  ONETOUCH ULTRA TEST Use to check BS BID. DM E11.9  
  
 metFORMIN 500 mg tablet Commonly known as:  GLUCOPHAGE  
TAKE 2 TABS BY MOUTH TWO (2) TIMES DAILY (WITH MEALS)  
  
 multivitamin tablet Commonly known as:  ONE A DAY Take 1 Tab by mouth daily. omeprazole 40 mg capsule Commonly known as:  PRILOSEC Take 40 mg by mouth daily. pravastatin 20 mg tablet Commonly known as:  PRAVACHOL Take 1 Tab by mouth. SITagliptin 100 mg tablet Commonly known as:  Celine Juliette Take 1 Tab by mouth daily. We Performed the Following CBC WITH AUTOMATED DIFF [46315 CPT(R)] HEMOGLOBIN A1C WITH EAG [06077 CPT(R)] LIPID PANEL [46947 CPT(R)] METABOLIC PANEL, COMPREHENSIVE [88734 CPT(R)] Follow-up Instructions Return in about 2 weeks (around 4/24/2018) for recheck blood pressure. Patient Instructions Omron BP Machine with plug in 
 
130/80 Introducing [facility] SERVICES! Dear Jamal Ya: Thank you for requesting a Emair account. Our records indicate that you already have an active Emair account. You can access your account anytime at [email or web address] Did you know that you can access your hospital and ER discharge instructions at any time in Emair? You can also review all of your test results from your hospital stay or ER visit. Additional Information If you have questions, please visit the Frequently Asked Questions section of the Emair website at [email or web address] Remember, MyChart is NOT to be used for urgent needs. For medical emergencies, dial 911. Now available from your iPhone and Android! Please provide this summary of care documentation to your next provider. Your primary care clinician is listed as Marcelle Shirley. If you have any questions after today's visit, please call [phone].

## 2018-04-10 NOTE — PROGRESS NOTES
Chief Complaint   Patient presents with    Diabetes     6 month follow up     1. Have you been to the ER, urgent care clinic since your last visit? Hospitalized since your last visit? NO    2. Have you seen or consulted any other health care providers outside of the [address] [place] [address] since your last visit? Include any pap smears or colon screening. YES Dr. Taye Carpenter at [address] [address] Surgeon 1/2018 for right ankle replacement.

## 2018-04-11 VITALS
DIASTOLIC BLOOD PRESSURE: 80 MMHG | BODY MASS INDEX: 28.41 KG/M2 | TEMPERATURE: 98.2 F | HEART RATE: 69 BPM | RESPIRATION RATE: 18 BRPM | SYSTOLIC BLOOD PRESSURE: 142 MMHG | WEIGHT: 191.8 LBS | OXYGEN SATURATION: 98 % | HEIGHT: 69 IN

## 2018-04-13 ENCOUNTER — HOSPITAL ENCOUNTER (OUTPATIENT)
Dept: LAB | Age: 68
Discharge: HOME | End: 2018-04-13
Payer: MEDICARE

## 2018-04-13 PROCEDURE — 80061 LIPID PANEL: CPT

## 2018-04-13 PROCEDURE — 83036 HEMOGLOBIN GLYCOSYLATED A1C: CPT

## 2018-04-13 PROCEDURE — 80053 COMPREHEN METABOLIC PANEL: CPT

## 2018-04-13 PROCEDURE — 36415 COLL VENOUS BLD VENIPUNCTURE: CPT

## 2018-04-13 PROCEDURE — 85025 COMPLETE CBC W/AUTO DIFF WBC: CPT

## 2018-04-13 RX ORDER — METFORMIN HYDROCHLORIDE 500 MG/1
TABLET ORAL
Qty: 180 TAB | Refills: 3 | Status: SHIPPED | OUTPATIENT
Start: 2018-04-13 | End: 2018-07-19 | Stop reason: SDUPTHER

## 2018-04-14 LAB
ALBUMIN SERPL-MCNC: 4.4 G/DL (ref 3.6–4.8)
ALBUMIN/GLOB SERPL: 1.8 {RATIO} (ref 1.2–2.2)
ALP SERPL-CCNC: 84 IU/L (ref 39–117)
ALT SERPL-CCNC: 13 IU/L (ref 0–44)
AST SERPL-CCNC: 19 IU/L (ref 0–40)
BASOPHILS # BLD AUTO: 0 X10E3/UL (ref 0–0.2)
BASOPHILS NFR BLD AUTO: 1 %
BILIRUB SERPL-MCNC: 0.4 MG/DL (ref 0–1.2)
BUN SERPL-MCNC: 11 MG/DL (ref 8–27)
BUN/CREAT SERPL: 16 (ref 10–24)
CALCIUM SERPL-MCNC: 10 MG/DL (ref 8.6–10.2)
CHLORIDE SERPL-SCNC: 100 MMOL/L (ref 96–106)
CHOLEST SERPL-MCNC: 137 MG/DL (ref 100–199)
CO2 SERPL-SCNC: 27 MMOL/L (ref 18–29)
CREAT SERPL-MCNC: 0.68 MG/DL (ref 0.76–1.27)
EOSINOPHIL # BLD AUTO: 0.3 X10E3/UL (ref 0–0.4)
EOSINOPHIL NFR BLD AUTO: 5 %
ERYTHROCYTE [DISTWIDTH] IN BLOOD BY AUTOMATED COUNT: 13.3 % (ref 12.3–15.4)
EST. AVERAGE GLUCOSE BLD GHB EST-MCNC: 140 MG/DL
GFR SERPLBLD CREATININE-BSD FMLA CKD-EPI: 113 ML/MIN/1.73
GFR SERPLBLD CREATININE-BSD FMLA CKD-EPI: 98 ML/MIN/1.73
GLOBULIN SER CALC-MCNC: 2.5 G/DL (ref 1.5–4.5)
GLUCOSE SERPL-MCNC: 164 MG/DL (ref 65–99)
HBA1C MFR BLD: 6.5 % (ref 4.8–5.6)
HCT VFR BLD AUTO: 44.2 % (ref 37.5–51)
HDLC SERPL-MCNC: 44 MG/DL
HGB BLD-MCNC: 15 G/DL (ref 13–17.7)
IMM GRANULOCYTES # BLD: 0 X10E3/UL (ref 0–0.1)
IMM GRANULOCYTES NFR BLD: 0 %
INTERPRETATION, 910389: NORMAL
LDLC SERPL CALC-MCNC: 76 MG/DL (ref 0–99)
LYMPHOCYTES # BLD AUTO: 1.2 X10E3/UL (ref 0.7–3.1)
LYMPHOCYTES NFR BLD AUTO: 19 %
Lab: NORMAL
MCH RBC QN AUTO: 30.8 PG (ref 26.6–33)
MCHC RBC AUTO-ENTMCNC: 33.9 G/DL (ref 31.5–35.7)
MCV RBC AUTO: 91 FL (ref 79–97)
MONOCYTES # BLD AUTO: 0.4 X10E3/UL (ref 0.1–0.9)
MONOCYTES NFR BLD AUTO: 6 %
NEUTROPHILS # BLD AUTO: 4.5 X10E3/UL (ref 1.4–7)
NEUTROPHILS NFR BLD AUTO: 69 %
PLATELET # BLD AUTO: 219 X10E3/UL (ref 150–379)
POTASSIUM SERPL-SCNC: 4.5 MMOL/L (ref 3.5–5.2)
PROT SERPL-MCNC: 6.9 G/DL (ref 6–8.5)
RBC # BLD AUTO: 4.87 X10E6/UL (ref 4.14–5.8)
SODIUM SERPL-SCNC: 141 MMOL/L (ref 134–144)
TRIGL SERPL-MCNC: 84 MG/DL (ref 0–149)
VLDLC SERPL CALC-MCNC: 17 MG/DL (ref 5–40)
WBC # BLD AUTO: 6.4 X10E3/UL (ref 3.4–10.8)

## 2018-04-26 ENCOUNTER — OFFICE VISIT (OUTPATIENT)
Dept: INTERNAL MEDICINE CLINIC | Age: 68
End: 2018-04-26

## 2018-04-26 VITALS
HEIGHT: 69 IN | TEMPERATURE: 98 F | OXYGEN SATURATION: 97 % | WEIGHT: 191.6 LBS | SYSTOLIC BLOOD PRESSURE: 140 MMHG | BODY MASS INDEX: 28.38 KG/M2 | DIASTOLIC BLOOD PRESSURE: 82 MMHG | RESPIRATION RATE: 15 BRPM | HEART RATE: 80 BPM

## 2018-04-26 DIAGNOSIS — I10 ESSENTIAL HYPERTENSION: Primary | ICD-10-CM

## 2018-04-26 RX ORDER — ASPIRIN 81 MG/1
81 TABLET ORAL DAILY
COMMUNITY

## 2018-04-26 RX ORDER — OLMESARTAN MEDOXOMIL 20 MG/1
20 TABLET ORAL DAILY
Qty: 30 TAB | Refills: 2 | Status: SHIPPED | OUTPATIENT
Start: 2018-04-26 | End: 2018-05-25 | Stop reason: SDUPTHER

## 2018-04-26 NOTE — PATIENT INSTRUCTIONS
Olmesartan (By mouth)   Olmesartan Medoxomil (de-[id number]-MFG-tan np-KBC-uw-mil)  Treats high blood pressure. This medicine is an angiotensin receptor blocker (ARB). Brand Name(s): Benicar   There may be other brand names for this medicine. When This Medicine Should Not Be Used: This medicine is not right for everyone. Do not use it if you had an allergic reaction to olmesartan, or if you are pregnant. How to Use This Medicine:   Tablet  · Take your medicine as directed. Your dose may need to be changed several times to find what works best for you. · Shake the oral liquid well before each use. Measure the oral liquid medicine with a marked measuring spoon, oral syringe, or medicine cup. · Missed dose: Take a dose as soon as you remember. If it is almost time for your next dose, wait until then and take a regular dose. Do not take extra medicine to make up for a missed dose. · Store the medicine in a closed container at room temperature, away from heat, moisture, and direct light. Store the oral liquid in the refrigerator. Throw away any unused medicine after 4 weeks. Drugs and Foods to Avoid:   Ask your doctor or pharmacist before using any other medicine, including over-the-counter medicines, vitamins, and herbal products. · Do not use this medicine together with aliskiren. · Some medicines can affect how olmesartan works. Tell your doctor if you are using any of the following:  ¨ Lithium  ¨ An ACE inhibitor blood pressure medicine  ¨ An NSAID pain or arthritis medicine, such as aspirin, diclofenac, ibuprofen, naproxen  ¨ A diuretic (water pill)  · If you also use colesevelam, take it at least 4 hours after you take olmesartan. Warnings While Using This Medicine:   · It is not safe to take this medicine during pregnancy. It could harm an unborn baby. Tell your doctor right away if you become pregnant. · Tell your doctor if you are breastfeeding, or if you have kidney disease or heart failure.   · This medicine may cause severe chronic diarrhea with weight loss. This could occur months to years after you start taking this medicine. · This medicine could lower your blood pressure too much, especially when you first use it or if you are dehydrated. Stand or sit up slowly if you feel lightheaded or dizzy. · Drink plenty of fluids if you exercise, sweat more than usual, or have diarrhea or vomiting while you are using this medicine. · Your doctor will do lab tests at regular visits to check on the effects of this medicine. Keep all appointments. · Keep all medicine out of the reach of children. Never share your medicine with anyone. Possible Side Effects While Using This Medicine:   Call your doctor right away if you notice any of these side effects:  · Allergic reaction: Itching or hives, swelling in your face or hands, swelling or tingling in your mouth or throat, chest tightness, trouble breathing  · Change in how much or how often you urinate, bloody or cloudy urine  · Lightheadedness, dizziness, or fainting  · Rapid weight gain, swelling in your hands, ankles, or feet  · Severe diarrhea with weight loss  If you notice other side effects that you think are caused by this medicine, tell your doctor. Call your doctor for medical advice about side effects. You may report side effects to FDA at 7-372-FDA-5223  © 2017 [facility][note 3] Information is for End User's use only and may not be sold, redistributed or otherwise used for commercial purposes. The above information is an  only. It is not intended as medical advice for individual conditions or treatments. Talk to your doctor, nurse or pharmacist before following any medical regimen to see if it is safe and effective for you.

## 2018-04-26 NOTE — PROGRESS NOTES
Chief Complaint   Patient presents with    Elevated Blood Pressure     2 week follow up     1. Have you been to the ER, urgent care clinic since your last visit? Hospitalized since your last visit? No    2. Have you seen or consulted any other health care providers outside of the [address] [place] [address] since your last visit? Include any pap smears or colon screening.  No

## 2018-04-26 NOTE — MR AVS SNAPSHOT
[address] [place] [address], [address] [place] [address] 
[phone] Patient: Duglas Romero MRN: [id number] :1950 Visit Information Date & Time Provider Department Dept. Phone Encounter #  
 2018  3:00 PM MD Iss[id number] Follow-up Instructions Return in about 4 weeks (around 2018) for Follow up 15 minutes. Upcoming Health Maintenance Date Due ZOSTER VACCINE AGE 60> 2009 FOOT EXAM Q1 2018 FOBT Q 1 YEAR AGE 50-75 2018 MEDICARE YEARLY EXAM 2018 EYE EXAM RETINAL OR DILATED Q1 2018 HEMOGLOBIN A1C Q6M 10/13/2018 MICROALBUMIN Q1 10/16/2018 LIPID PANEL Q1 2019 GLAUCOMA SCREENING Q2Y 2019 DTaP/Tdap/Td series (2 - Td) 2025 Allergies as of 2018  Review Complete On: 2018 By: Macy Kidd LPN Severity Noted Reaction Type Reactions Iodine  11/10/2011    Nausea Only Shrimp Current Immunizations  Reviewed on 2017 Name Date Influenza High Dose Vaccine PF 2017 Influenza Vaccine (Quad) PF 2016  4:39 PM  
 Pneumococcal Conjugate (PCV-13) 2015 Pneumococcal Polysaccharide (PPSV-23) 2015  4:45 PM  
 Tdap 2015 Not reviewed this visit You Were Diagnosed With   
  
 Codes Comments Essential hypertension    -  Primary ICD-10-CM: I10 
ICD-9-CM: 401.9 Vitals BP Pulse Temp Resp Height(growth percentile) Weight(growth percentile) 140/82 (BP 1 Location: Left arm, BP Patient Position: Sitting) 80 98 °F (36.7 °C) (Oral) 15 5' 9\" (1.753 m) 191 lb 9.6 oz (86.9 kg) SpO2 BMI Smoking Status 97% 28.29 kg/m2 Never Smoker Vitals History BMI and BSA Data Body Mass Index Body Surface Area  
 28.29 kg/m 2 2.06 m 2 Preferred Pharmacy Pharmacy Name Phone [facility] #[id number]- [address] [facility] [phone] Your Updated Medication List  
  
   
This list is accurate as of 4/26/18  3:51 PM.  Always use your most recent med list.  
  
  
  
  
 * aspirin delayed-release 81 mg tablet Take  by mouth daily. * aspirin delayed-release 325 mg tablet Take 325 mg by mouth. glucose blood VI test strips strip Commonly known as:  ONETOUCH ULTRA TEST Use to check BS BID. DM E11.9  
  
 metFORMIN 500 mg tablet Commonly known as:  GLUCOPHAGE  
TAKE 2 TABLETS TWICE A DAY WITH MEALS  
  
 multivitamin tablet Commonly known as:  ONE A DAY Take 1 Tab by mouth daily. olmesartan 20 mg tablet Commonly known as:  [employer] Take 1 Tab by mouth daily. omeprazole 40 mg capsule Commonly known as:  PRILOSEC Take 40 mg by mouth as needed. pravastatin 20 mg tablet Commonly known as:  PRAVACHOL Take 1 Tab by mouth. SITagliptin 100 mg tablet Commonly known as:  Faith Darner Take 1 Tab by mouth daily. * Notice: This list has 2 medication(s) that are the same as other medications prescribed for you. Read the directions carefully, and ask your doctor or other care provider to review them with you. Prescriptions Sent to Pharmacy Refills  
 olmesartan (BENICAR) 20 mg tablet 2 Sig: Take 1 Tab by mouth daily. Class: Normal  
 Pharmacy: [facility]/pharmacy #[id number] Ph #: [phone] Route: Oral  
  
Follow-up Instructions Return in about 4 weeks (around 5/24/2018) for Follow up 15 minutes. Patient Instructions Olmesartan (By mouth) Olmesartan Medoxomil (ly-ks-MZC-tan ut-OPX-oc-mil) Treats high blood pressure. This medicine is an angiotensin receptor blocker (ARB). Brand Name(s): Benicar There may be other brand names for this medicine. When This Medicine Should Not Be Used: This medicine is not right for everyone. Do not use it if you had an allergic reaction to olmesartan, or if you are pregnant. How to Use This Medicine:  
Tablet · Take your medicine as directed. Your dose may need to be changed several times to find what works best for you. · Shake the oral liquid well before each use. Measure the oral liquid medicine with a marked measuring spoon, oral syringe, or medicine cup. · Missed dose: Take a dose as soon as you remember. If it is almost time for your next dose, wait until then and take a regular dose. Do not take extra medicine to make up for a missed dose. · Store the medicine in a closed container at room temperature, away from heat, moisture, and direct light. Store the oral liquid in the refrigerator. Throw away any unused medicine after 4 weeks. Drugs and Foods to Avoid: Ask your doctor or pharmacist before using any other medicine, including over-the-counter medicines, vitamins, and herbal products. · Do not use this medicine together with aliskiren. · Some medicines can affect how olmesartan works. Tell your doctor if you are using any of the following: ¨ Lithium ¨ An ACE inhibitor blood pressure medicine ¨ An NSAID pain or arthritis medicine, such as aspirin, diclofenac, ibuprofen, naproxen ¨ A diuretic (water pill) · If you also use colesevelam, take it at least 4 hours after you take olmesartan. Warnings While Using This Medicine: · It is not safe to take this medicine during pregnancy. It could harm an unborn baby. Tell your doctor right away if you become pregnant. · Tell your doctor if you are breastfeeding, or if you have kidney disease or heart failure. · This medicine may cause severe chronic diarrhea with weight loss. This could occur months to years after you start taking this medicine. · This medicine could lower your blood pressure too much, especially when you first use it or if you are dehydrated.  Stand or sit up slowly if you feel lightheaded or dizzy. · Drink plenty of fluids if you exercise, sweat more than usual, or have diarrhea or vomiting while you are using this medicine. · Your doctor will do lab tests at regular visits to check on the effects of this medicine. Keep all appointments. · Keep all medicine out of the reach of children. Never share your medicine with anyone. Possible Side Effects While Using This Medicine:  
Call your doctor right away if you notice any of these side effects: · Allergic reaction: Itching or hives, swelling in your face or hands, swelling or tingling in your mouth or throat, chest tightness, trouble breathing · Change in how much or how often you urinate, bloody or cloudy urine · Lightheadedness, dizziness, or fainting · Rapid weight gain, swelling in your hands, ankles, or feet · Severe diarrhea with weight loss If you notice other side effects that you think are caused by this medicine, tell your doctor. Call your doctor for medical advice about side effects. You may report side effects to FDA at 7-301-JGQ-0952 © 2017 [facility] Information is for End User's use only and may not be sold, redistributed or otherwise used for commercial purposes. The above information is an  only. It is not intended as medical advice for individual conditions or treatments. Talk to your doctor, nurse or pharmacist before following any medical regimen to see if it is safe and effective for you. Introducing [facility]! Dear Topher López: Thank you for requesting a [employer] account. Our records indicate that you already have an active [employer] account. You can access your account anytime at [email or web address] Did you know that you can access your hospital and ER discharge instructions at any time in [employer]? You can also review all of your test results from your hospital stay or ER visit. Additional Information If you have questions, please visit the Frequently Asked Questions section of the [employer] website at [email or web address] Remember, [employer] is NOT to be used for urgent needs. For medical emergencies, dial 911. Now available from your iPhone and Android! Please provide this summary of care documentation to your next provider. Your primary care clinician is listed as Jose Sykes. If you have any questions after today's visit, please call [phone].

## 2018-04-26 NOTE — PROGRESS NOTES
Elevated Blood Pressure (2 week follow up)       HPI:  Regis Murillo is a 75y.o. year old male who is here for a follow up visit. He was last seen by me on 4/10/2018. He reports the following:    Took high dose ibuprofen for 2 weeks. Now feeling well    BP at home readings are high with avereage 150/80's  No symptoms of CP or headache. Assessment and Plan        1. Essential hypertension  The risks and benefits of the new medication were discussed as well as possible side effects. Patient is instructed to call if any new symptoms arise that are listed in the AVS printed or available on Architiz[employer] or discussed:  Dizziness, rash. headache  - aspirin delayed-release 81 mg tablet; Take  by mouth daily. - olmesartan (BENICAR) 20 mg tablet; Take 1 Tab by mouth daily. Dispense: 30 Tab; Refill: 2              Visit Vitals    /82 (BP 1 Location: Left arm, BP Patient Position: Sitting)    Pulse 80    Temp 98 °F (36.7 °C) (Oral)    Resp 15    Ht 5' 9\" (1.753 m)    Wt 191 lb 9.6 oz (86.9 kg)    SpO2 97%    BMI 28.29 kg/m2       Historical Data    Past Medical History:   Diagnosis Date    Blastomycosis 1990    (treated with Sporanox) -Left Lung    Diabetes ([facility] [address]     Type 2    History of colonic polyps     Tinnitus        Past Surgical History:   Procedure Laterality Date    HX ANKLE FRACTURE TX Right 01/2018    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  metFORMIN (GLUCOPHAGE) 500 mg tablet TAKE 2 TABLETS TWICE A DAY WITH MEALS 180 Tab 3    pravastatin (PRAVACHOL) 20 mg tablet Take 1 Tab by mouth.  glucose blood VI test strips (ONETOUCH ULTRA TEST) strip Use to check BS BID.  DM E11.9 200 Strip 1    omeprazole (PRILOSEC) 40 mg capsule Take 40 mg by mouth as needed. 0    SITagliptin (JANUVIA) 100 mg tablet Take 1 Tab by mouth daily. 90 Tab 3    multivitamin (ONE A DAY) tablet Take 1 Tab by mouth daily.  aspirin delayed-release 325 mg tablet Take 325 mg by mouth. No facility-administered encounter medications on file as of 4/26/2018. 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 Review of Systems   Constitutional: Negative for weight loss. Eyes: Negative for blurred vision. Respiratory: Negative for shortness of breath. Cardiovascular: Negative for chest pain. Gastrointestinal: Negative for abdominal pain. Genitourinary: Negative for dysuria and frequency. Skin: Negative for rash. Neurological: Negative for dizziness, focal weakness, weakness and headaches. Endo/Heme/Allergies: Negative for environmental allergies. Does not bruise/bleed easily. Physical Exam   Constitutional: He is oriented to person, place, and time. He appears well-developed and well-nourished. He is active. Non-toxic appearance. He does not have a sickly appearance. He does not appear ill. No distress. Eyes: Conjunctivae are normal. Right eye exhibits no discharge.    Cardiovascular: Normal rate, regular rhythm, S1 normal, S2 normal, normal heart sounds and normal pulses. Exam reveals no gallop and no friction rub. Pulmonary/Chest: Effort normal and breath sounds normal. No respiratory distress. Abdominal: Soft. Bowel sounds are normal. He exhibits no distension. Musculoskeletal: He exhibits no edema or deformity. Neurological: He is alert and oriented to person, place, and time. Skin: Skin is warm and dry. No rash noted. No pallor. Psychiatric: He has a normal mood and affect. His behavior is normal.   Vitals reviewed. Ortho Exam      Orders Placed This Encounter    aspirin delayed-release 81 mg tablet     Sig: Take  by mouth daily. I have reviewed the patient's medical history in detail and updated the computerized patient record. We had a prolonged discussion about these complex clinical issues and went over the various important aspects to consider. All questions were answered. Advised him to call back or return to office if symptoms do not improve, change in nature, or persist.    He was given an after visit summary or informed of [employer] Access which includes patient instructions, diagnoses, current medications, & vitals. He expressed understanding with the diagnosis and plan.

## 2018-05-21 DIAGNOSIS — E11.9 CONTROLLED TYPE 2 DIABETES MELLITUS WITHOUT COMPLICATION, WITHOUT LONG-TERM CURRENT USE OF INSULIN (HCC): ICD-10-CM

## 2018-05-21 NOTE — TELEPHONE ENCOUNTER
Requested Prescriptions     Pending Prescriptions Disp Refills    pravastatin (PRAVACHOL) 20 mg tablet       Sig: Take 1 Tab by mouth.      chelo wise    04/26/2018 05/25/2018

## 2018-05-22 RX ORDER — PRAVASTATIN SODIUM 20 MG/1
20 TABLET ORAL DAILY
Qty: 90 TAB | Refills: 2 | Status: SHIPPED | OUTPATIENT
Start: 2018-05-22 | End: 2018-07-19 | Stop reason: SDUPTHER

## 2018-05-25 ENCOUNTER — OFFICE VISIT (OUTPATIENT)
Dept: INTERNAL MEDICINE CLINIC | Age: 68
End: 2018-05-25

## 2018-05-25 VITALS
DIASTOLIC BLOOD PRESSURE: 82 MMHG | TEMPERATURE: 98.1 F | SYSTOLIC BLOOD PRESSURE: 138 MMHG | HEART RATE: 72 BPM | OXYGEN SATURATION: 95 % | HEIGHT: 69 IN | RESPIRATION RATE: 15 BRPM | BODY MASS INDEX: 28.35 KG/M2 | WEIGHT: 191.4 LBS

## 2018-05-25 DIAGNOSIS — D33.3 ACOUSTIC NEUROMA (HCC): ICD-10-CM

## 2018-05-25 DIAGNOSIS — I10 ESSENTIAL HYPERTENSION: Primary | ICD-10-CM

## 2018-05-25 RX ORDER — OLMESARTAN MEDOXOMIL 20 MG/1
20 TABLET ORAL DAILY
Qty: 90 TAB | Refills: 3 | Status: SHIPPED | OUTPATIENT
Start: 2018-05-25 | End: 2018-07-19 | Stop reason: SDUPTHER

## 2018-05-25 NOTE — PROGRESS NOTES
Hypertension (4 week follow up)       HPI:  Marquis Rick is a 75y.o. year old male who is here for a follow up visit. He was last seen by me on 4/26/2018. He reports the following:    Benicar 20 mg  - started. At home /78    Has acoustic neuroma 1 cm. Left side. Recent diagnosis. 6-12 follow up with [facility]. Assessment and Plan        1. Essential hypertension  Not exactly at goal but will give several more weeks for him to monitor. Goal is 130/80    - olmesartan (BENICAR) 20 mg tablet; Take 1 Tab by mouth daily. Dispense: 90 Tab; Refill: 3    2. Acoustic neuroma ([address]  As per ENT-  Left side hearing loss. Watch and wait any changes in size. 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 90 Tab 2    aspirin delayed-release 81 mg tablet Take  by mouth daily.  olmesartan (BENICAR) 20 mg tablet Take 1 Tab by mouth daily. 30 Tab 2    metFORMIN (GLUCOPHAGE) 500 mg tablet TAKE 2 TABLETS TWICE A DAY WITH MEALS 180 Tab 3    glucose blood VI test strips (ONETOUCH ULTRA TEST) strip Use to check BS BID.  DM E11.9 200 Strip 1    SITagliptin (JANUVIA) 100 mg tablet Take 1 Tab by mouth daily. 90 Tab 3    multivitamin (ONE A DAY) tablet Take 1 Tab by mouth daily.  aspirin delayed-release 325 mg tablet Take 325 mg by mouth.  omeprazole (PRILOSEC) 40 mg capsule Take 40 mg by mouth as needed. 0     No facility-administered encounter medications on file as of 5/25/2018. 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 ROS      Physical Exam   Ortho Exam      No orders of the defined types were placed in this encounter. I have reviewed the patient's medical history in detail and updated the computerized patient record. We had a prolonged discussion about these complex clinical issues and went over the various important aspects to consider. All questions were answered. Advised him to call back or return to office if symptoms do not improve, change in nature, or persist.    He was given an after visit summary or informed of [employer] Access which includes patient instructions, diagnoses, current medications, & vitals. He expressed understanding with the diagnosis and plan.

## 2018-05-25 NOTE — PROGRESS NOTES
Chief Complaint   Patient presents with    Hypertension     4 week follow up     1. Have you been to the ER, urgent care clinic since your last visit? Hospitalized since your last visit? No    2. Have you seen or consulted any other health care providers outside of the [address] [place] [address] since your last visit? Include any pap smears or colon screening.  No

## 2018-05-28 PROBLEM — D33.3 ACOUSTIC NEUROMA (HCC): Status: ACTIVE | Noted: 2018-05-28

## 2018-07-19 DIAGNOSIS — E11.9 CONTROLLED TYPE 2 DIABETES MELLITUS WITHOUT COMPLICATION, WITHOUT LONG-TERM CURRENT USE OF INSULIN (HCC): ICD-10-CM

## 2018-07-19 DIAGNOSIS — I10 ESSENTIAL HYPERTENSION: ICD-10-CM

## 2018-07-19 RX ORDER — METFORMIN HYDROCHLORIDE 500 MG/1
TABLET ORAL
Qty: 180 TAB | Refills: 3 | Status: SHIPPED | OUTPATIENT
Start: 2018-07-19 | End: 2018-07-24 | Stop reason: SDUPTHER

## 2018-07-19 RX ORDER — PRAVASTATIN SODIUM 20 MG/1
20 TABLET ORAL DAILY
Qty: 90 TAB | Refills: 2 | Status: SHIPPED | OUTPATIENT
Start: 2018-07-19 | End: 2018-07-24 | Stop reason: SDUPTHER

## 2018-07-19 RX ORDER — OLMESARTAN MEDOXOMIL 20 MG/1
20 TABLET ORAL DAILY
Qty: 90 TAB | Refills: 3 | Status: SHIPPED | OUTPATIENT
Start: 2018-07-19 | End: 2018-07-24 | Stop reason: SDUPTHER

## 2018-07-19 NOTE — TELEPHONE ENCOUNTER
Last office visit- 5/25/18  Next office visit- 8/31/18  Last refill-    Requested Prescriptions     Pending Prescriptions Disp Refills    metFORMIN (GLUCOPHAGE) 500 mg tablet 180 Tab 3    olmesartan (BENICAR) 20 mg tablet 90 Tab 3     Sig: Take 1 Tab by mouth daily.  pravastatin (PRAVACHOL) 20 mg tablet 90 Tab 2     Sig: Take 1 Tab by mouth daily.

## 2018-07-24 DIAGNOSIS — E11.9 CONTROLLED TYPE 2 DIABETES MELLITUS WITHOUT COMPLICATION, WITHOUT LONG-TERM CURRENT USE OF INSULIN (HCC): ICD-10-CM

## 2018-07-24 DIAGNOSIS — I10 ESSENTIAL HYPERTENSION: ICD-10-CM

## 2018-07-24 RX ORDER — OLMESARTAN MEDOXOMIL 20 MG/1
20 TABLET ORAL DAILY
Qty: 90 TAB | Refills: 3 | Status: SHIPPED | COMMUNITY
Start: 2018-07-24 | End: 2019-03-15 | Stop reason: SDUPTHER

## 2018-07-24 RX ORDER — METFORMIN HYDROCHLORIDE 500 MG/1
TABLET ORAL
Qty: 180 TAB | Refills: 3 | Status: SHIPPED | COMMUNITY
Start: 2018-07-24 | End: 2019-03-15 | Stop reason: SDUPTHER

## 2018-07-24 RX ORDER — PRAVASTATIN SODIUM 20 MG/1
20 TABLET ORAL DAILY
Qty: 90 TAB | Refills: 2 | Status: SHIPPED | COMMUNITY
Start: 2018-07-24 | End: 2019-03-15 | Stop reason: SDUPTHER

## 2018-07-24 NOTE — TELEPHONE ENCOUNTER
Requested Prescriptions     Pending Prescriptions Disp Refills    pravastatin (PRAVACHOL) 20 mg tablet 90 Tab 2     Sig: Take 1 Tab by mouth daily.  olmesartan (BENICAR) 20 mg tablet 90 Tab 3     Sig: Take 1 Tab by mouth daily.  metFORMIN (GLUCOPHAGE) 500 mg tablet 180 Tab 3     Sig: TAKE 2 TABLETS TWICE A DAY WITH MEALS     Please send to [facility]. Patient is going to cancel the refills that were sent to [facility] on 07/19/2018.  05/25/2018 08/31/2018

## 2018-08-31 ENCOUNTER — OFFICE VISIT (OUTPATIENT)
Dept: INTERNAL MEDICINE CLINIC | Age: 68
End: 2018-08-31

## 2018-08-31 VITALS
DIASTOLIC BLOOD PRESSURE: 78 MMHG | OXYGEN SATURATION: 98 % | SYSTOLIC BLOOD PRESSURE: 122 MMHG | WEIGHT: 191 LBS | BODY MASS INDEX: 28.29 KG/M2 | TEMPERATURE: 98 F | RESPIRATION RATE: 16 BRPM | HEIGHT: 69 IN | HEART RATE: 62 BPM

## 2018-08-31 DIAGNOSIS — E11.9 CONTROLLED TYPE 2 DIABETES MELLITUS WITHOUT COMPLICATION, WITHOUT LONG-TERM CURRENT USE OF INSULIN (HCC): ICD-10-CM

## 2018-08-31 DIAGNOSIS — I10 ESSENTIAL HYPERTENSION: Primary | ICD-10-CM

## 2018-08-31 NOTE — PROGRESS NOTES
Angel Tamayo is a 75 y.o. male No chief complaint on file. 1. Have you been to the ER, urgent care clinic since your last visit? Hospitalized since your last visit? No 
 
2. Have you seen or consulted any other health care providers outside of the [address] [place] [address] since your last visit? Include any pap smears or colon screening. No  
 
Visit Vitals  /78 (BP 1 Location: Left leg, BP Patient Position: Sitting)  Pulse 62  Temp 98 °F (36.7 °C) (Oral)  Resp 16  
 Ht 5' 9\" (1.753 m)  Wt 191 lb (86.6 kg)  SpO2 98%  BMI 28.21 kg/m2 Health Maintenance Due Topic Date Due  
 ZOSTER VACCINE AGE 60>  08/07/2009  
 FOOT EXAM Q1  01/12/2018  FOBT Q 1 YEAR AGE 50-75  01/12/2018  Influenza Age 5 to Adult  08/01/2018  
 EYE EXAM RETINAL OR DILATED Q1  09/26/2018 Avel Matos LPN

## 2018-08-31 NOTE — PROGRESS NOTES
Hypertension HPI: 
Angel Tamayo is a 75y.o. year old male who is here for a follow up visit. He was last seen by me on 5/25/2018. He reports the following: 
 
BP follow up  142/84 Lab Results Component Value Date/Time Hemoglobin A1c 6.5 (H) 04/13/2018 07:23 AM  
   
 
Pt brought in BP cuff. Assessment and Plan 1. Essential hypertension Tolerating medication. Denies dizziness that is positional, SOB, or chest pain. Understands the importance of compliance to reduce risk of future heart failure. Agreed to call if any of above symptoms develop and  stay on current regimen of  benicar 20 mg 
- METABOLIC PANEL, COMPREHENSIVE 
- LIPID PANEL 
- CBC WITH AUTOMATED DIFF 
- HEMOGLOBIN A1C WITH EAG 2. Controlled type 2 diabetes mellitus without complication, without long-term current use of insulin ([address] Under good control on metformin. Keep up the good work. - METABOLIC PANEL, COMPREHENSIVE 
- LIPID PANEL 
- CBC WITH AUTOMATED DIFF 
- HEMOGLOBIN A1C WITH EAG Visit Vitals  /78 (BP 1 Location: Left leg, BP Patient Position: Sitting)  Pulse 62  Temp 98 °F (36.7 °C) (Oral)  Resp 16  
 Ht 5' 9\" (1.753 m)  Wt 191 lb (86.6 kg)  SpO2 98%  BMI 28.21 kg/m2 Historical Data Past Medical History:  
Diagnosis Date  Acoustic neuroma ([facility] 5/28/2018  Blastomycosis 1990  
 (treated with Sporanox) -Left Lung  Diabetes ([facility] [address] Type 2  
 History of colonic polyps  Tinnitus Past Surgical History:  
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  
 several polyps  HX HEENT    
 tonsillectomy (age 10) [address] Plantar fascia trimmed Outpatient Encounter Prescriptions as of 8/31/2018 Medication Sig Dispense Refill  pravastatin (PRAVACHOL) 20 mg tablet Take 1 Tab by mouth daily.  90 Tab 2  
  olmesartan (BENICAR) 20 mg tablet Take 1 Tab by mouth daily. 90 Tab 3  
 metFORMIN (GLUCOPHAGE) 500 mg tablet TAKE 2 TABLETS TWICE A DAY WITH MEALS 180 Tab 3  
 aspirin delayed-release 81 mg tablet Take  by mouth daily.  glucose blood VI test strips (ONETOUCH ULTRA TEST) strip Use to check BS BID. DM E11.9 200 Strip 1  
 SITagliptin (JANUVIA) 100 mg tablet Take 1 Tab by mouth daily. 90 Tab 3  
 multivitamin (ONE A DAY) tablet Take 1 Tab by mouth daily. No facility-administered encounter medications on file as of 8/31/2018. Allergies Allergen Reactions  Iodine Nausea Only Shrimp Social History Social History  Marital status:  Spouse name: N/A  
 Number of children: N/A  
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  
 Drug use: No  
 Sexual activity: Yes  
  Partners: Female Other Topics Concern  Not on file Social History Narrative  
  
 
family history includes Cancer in his father; Dementia in his mother; Diabetes in his father and mother; Psychiatric Disorder in his mother; Stroke in his father. Review of Systems Constitutional: Negative for weight loss. Eyes: Negative for blurred vision. Respiratory: Negative for shortness of breath. Cardiovascular: Negative for chest pain. Gastrointestinal: Negative for abdominal pain. Genitourinary: Negative for dysuria and frequency. Skin: Negative for rash. Neurological: Negative for dizziness, focal weakness, weakness and headaches. Endo/Heme/Allergies: Negative for environmental allergies. Does not bruise/bleed easily. Physical Exam  
Constitutional: He appears well-developed and well-nourished. He is active. Non-toxic appearance. He does not have a sickly appearance.  He does not appear ill. No distress. Eyes: Conjunctivae are normal. Right eye exhibits no discharge. Cardiovascular: Normal rate, regular rhythm, S1 normal, S2 normal, normal heart sounds and normal pulses. Exam reveals no gallop and no friction rub. Pulmonary/Chest: Effort normal and breath sounds normal. No respiratory distress. Abdominal: Soft. Bowel sounds are normal.  
Musculoskeletal: He exhibits no edema or deformity. Neurological: He is alert. Skin: Skin is warm and dry. No rash noted. No pallor. Psychiatric: He has a normal mood and affect. His behavior is normal.  
Vitals reviewed. Ortho Exam 
 
 
Orders Placed This Encounter  METABOLIC PANEL, COMPREHENSIVE  LIPID PANEL  
 CBC WITH AUTOMATED DIFF  
 HEMOGLOBIN A1C WITH EAG I have reviewed the patient's medical history in detail and updated the computerized patient record. We had a prolonged discussion about these complex clinical issues and went over the various important aspects to consider. All questions were answered. Advised him to call back or return to office if symptoms do not improve, change in nature, or persist. 
 
He was given an after visit summary or informed of [employer] Access which includes patient instructions, diagnoses, current medications, & vitals. He expressed understanding with the diagnosis and plan.

## 2018-09-06 ENCOUNTER — HOSPITAL ENCOUNTER (OUTPATIENT)
Dept: LAB | Age: 68
Discharge: HOME | End: 2018-09-06
Payer: MEDICARE

## 2018-09-06 PROCEDURE — 85025 COMPLETE CBC W/AUTO DIFF WBC: CPT

## 2018-09-06 PROCEDURE — 80061 LIPID PANEL: CPT

## 2018-09-06 PROCEDURE — 36415 COLL VENOUS BLD VENIPUNCTURE: CPT

## 2018-09-06 PROCEDURE — 80053 COMPREHEN METABOLIC PANEL: CPT

## 2018-09-06 PROCEDURE — 83036 HEMOGLOBIN GLYCOSYLATED A1C: CPT

## 2018-09-07 LAB
ALBUMIN SERPL-MCNC: 4.8 G/DL (ref 3.6–4.8)
ALBUMIN/GLOB SERPL: 1.9 {RATIO} (ref 1.2–2.2)
ALP SERPL-CCNC: 74 IU/L (ref 39–117)
ALT SERPL-CCNC: 18 IU/L (ref 0–44)
AST SERPL-CCNC: 19 IU/L (ref 0–40)
BASOPHILS # BLD AUTO: 0 X10E3/UL (ref 0–0.2)
BASOPHILS NFR BLD AUTO: 1 %
BILIRUB SERPL-MCNC: 0.4 MG/DL (ref 0–1.2)
BUN SERPL-MCNC: 13 MG/DL (ref 8–27)
BUN/CREAT SERPL: 15 (ref 10–24)
CALCIUM SERPL-MCNC: 10.2 MG/DL (ref 8.6–10.2)
CHLORIDE SERPL-SCNC: 103 MMOL/L (ref 96–106)
CHOLEST SERPL-MCNC: 136 MG/DL (ref 100–199)
CO2 SERPL-SCNC: 24 MMOL/L (ref 20–29)
CREAT SERPL-MCNC: 0.84 MG/DL (ref 0.76–1.27)
EOSINOPHIL # BLD AUTO: 0.2 X10E3/UL (ref 0–0.4)
EOSINOPHIL NFR BLD AUTO: 3 %
ERYTHROCYTE [DISTWIDTH] IN BLOOD BY AUTOMATED COUNT: 12.9 % (ref 12.3–15.4)
EST. AVERAGE GLUCOSE BLD GHB EST-MCNC: 140 MG/DL
GLOBULIN SER CALC-MCNC: 2.5 G/DL (ref 1.5–4.5)
GLUCOSE SERPL-MCNC: 178 MG/DL (ref 65–99)
HBA1C MFR BLD: 6.5 % (ref 4.8–5.6)
HCT VFR BLD AUTO: 43.3 % (ref 37.5–51)
HDLC SERPL-MCNC: 42 MG/DL
HGB BLD-MCNC: 14.7 G/DL (ref 13–17.7)
IMM GRANULOCYTES # BLD: 0 X10E3/UL (ref 0–0.1)
IMM GRANULOCYTES NFR BLD: 0 %
INTERPRETATION, 910389: NORMAL
LDLC SERPL CALC-MCNC: 77 MG/DL (ref 0–99)
LYMPHOCYTES # BLD AUTO: 1.1 X10E3/UL (ref 0.7–3.1)
LYMPHOCYTES NFR BLD AUTO: 17 %
Lab: NORMAL
MCH RBC QN AUTO: 31.5 PG (ref 26.6–33)
MCHC RBC AUTO-ENTMCNC: 33.9 G/DL (ref 31.5–35.7)
MCV RBC AUTO: 93 FL (ref 79–97)
MONOCYTES # BLD AUTO: 0.4 X10E3/UL (ref 0.1–0.9)
MONOCYTES NFR BLD AUTO: 7 %
NEUTROPHILS # BLD AUTO: 4.6 X10E3/UL (ref 1.4–7)
NEUTROPHILS NFR BLD AUTO: 72 %
PLATELET # BLD AUTO: 184 X10E3/UL (ref 150–379)
POTASSIUM SERPL-SCNC: 4.6 MMOL/L (ref 3.5–5.2)
PROT SERPL-MCNC: 7.3 G/DL (ref 6–8.5)
RBC # BLD AUTO: 4.67 X10E6/UL (ref 4.14–5.8)
SODIUM SERPL-SCNC: 143 MMOL/L (ref 134–144)
TRIGL SERPL-MCNC: 85 MG/DL (ref 0–149)
VLDLC SERPL CALC-MCNC: 17 MG/DL (ref 5–40)
WBC # BLD AUTO: 6.4 X10E3/UL (ref 3.4–10.8)

## 2018-09-10 NOTE — PROGRESS NOTES
The a1c is holding steady at 6.5. That is considered in control. If you get below 6, we can adjust your medications (lower them). Message sent to patient via [employer]: 
September 10, 2018

## 2018-09-23 RX ORDER — SITAGLIPTIN 100 MG/1
TABLET, FILM COATED ORAL
Qty: 90 TAB | Refills: 3 | Status: SHIPPED | OUTPATIENT
Start: 2018-09-23 | End: 2019-03-15 | Stop reason: SDUPTHER

## 2018-10-11 RX ORDER — METFORMIN HYDROCHLORIDE 500 MG/1
TABLET ORAL
Qty: 180 TAB | Refills: 3 | Status: SHIPPED | OUTPATIENT
Start: 2018-10-11 | End: 2018-11-30

## 2018-11-27 ENCOUNTER — OFFICE VISIT (OUTPATIENT)
Dept: INTERNAL MEDICINE CLINIC | Age: 68
End: 2018-11-27

## 2018-11-27 VITALS
BODY MASS INDEX: 26.64 KG/M2 | SYSTOLIC BLOOD PRESSURE: 120 MMHG | HEIGHT: 70 IN | OXYGEN SATURATION: 94 % | TEMPERATURE: 97.8 F | RESPIRATION RATE: 18 BRPM | HEART RATE: 72 BPM | DIASTOLIC BLOOD PRESSURE: 82 MMHG | WEIGHT: 186.1 LBS

## 2018-11-27 DIAGNOSIS — E11.9 CONTROLLED TYPE 2 DIABETES MELLITUS WITHOUT COMPLICATION, WITHOUT LONG-TERM CURRENT USE OF INSULIN (HCC): Primary | ICD-10-CM

## 2018-11-27 DIAGNOSIS — Z23 ENCOUNTER FOR IMMUNIZATION: ICD-10-CM

## 2018-11-27 DIAGNOSIS — E78.5 HYPERLIPIDEMIA WITH TARGET LDL LESS THAN 100: ICD-10-CM

## 2018-11-27 DIAGNOSIS — I10 ESSENTIAL HYPERTENSION: ICD-10-CM

## 2018-11-27 NOTE — PROGRESS NOTES
Reviewed record in preparation for visit and have obtained necessary documentation. Identified pt with two pt identifiers(name and ). Health Maintenance Due Topic  Shingrix Vaccine Age 50> (1 of 2)  FOOT EXAM Q1   
 FOBT Q 1 YEAR AGE 50-75  Influenza Age 5 to Adult  MEDICARE YEARLY EXAM   
 MICROALBUMIN Q1 Chief Complaint Patient presents with  Hypertension 3 mth f/u Wt Readings from Last 3 Encounters:  
18 186 lb 1.6 oz (84.4 kg) 18 191 lb (86.6 kg) 18 191 lb 6.4 oz (86.8 kg) Temp Readings from Last 3 Encounters:  
18 98 °F (36.7 °C) (Oral) 18 98.1 °F (36.7 °C) (Oral) 18 98 °F (36.7 °C) (Oral) BP Readings from Last 3 Encounters:  
18 122/78  
18 138/82  
18 140/82 Pulse Readings from Last 3 Encounters:  
18 62  
18 72  
18 80 Learning Assessment: 
:  
 
Learning Assessment 2013 PRIMARY LEARNER Patient Patient Patient Patient Patient Patient HIGHEST LEVEL OF EDUCATION - PRIMARY LEARNER  - - > 4 YEARS OF COLLEGE > 4 YEARS OF COLLEGE - -  
BARRIERS PRIMARY LEARNER - - NONE NONE - - PRIMARY LANGUAGE ENGLISH ENGLISH ENGLISH ENGLISH ENGLISH ENGLISH  
LEARNER PREFERENCE PRIMARY DEMONSTRATION DEMONSTRATION DEMONSTRATION OTHER (COMMENT) DEMONSTRATION READING  
  - - - - READING OTHER (COMMENT)  
  - - - - LISTENING -  
  - - - - PICTURES -  
  - - - - VIDEOS -  
ANSWERED BY patient Patient  patient Self patient patient RELATIONSHIP SELF SELF SELF SELF SELF SELF Depression Screening: 
:  
 
PHQ over the last two weeks 2018 Little interest or pleasure in doing things Not at all Feeling down, depressed, irritable, or hopeless Not at all Total Score PHQ 2 0 Fall Risk Assessment: 
:  
 
Fall Risk Assessment, last 12 mths 2018 Able to walk? Yes Fall in past 12 months?  No  
 
 
 Abuse Screening: 
:  
 
Abuse Screening Questionnaire 8/31/2018 1/5/2016 1/27/2015 6/10/2014 Do you ever feel afraid of your partner? N N N N Are you in a relationship with someone who physically or mentally threatens you? N N N N Is it safe for you to go home? Amaury Newell Coordination of Care Questionnaire: 
:  
 
1) Have you been to an emergency room, urgent care clinic since your last visit? no  
Hospitalized since your last visit? no          
 
2) Have you seen or consulted any other health care providers outside of [address] [place] [address] since your last visit? Yes Dr. Sterling Persaud 11/27/2018  (Include any pap smears or colon screenings in this section.) 3) Do you have an Advance Directive on file? no 
 
4) Are you interested in receiving information on Advance Directives? NO Patient is accompanied by self I have received verbal consent from Jose Hdz to discuss any/all medical information while they are present in the room.

## 2018-11-27 NOTE — PROGRESS NOTES
Hypertension ( 3 mth f/u) and Immunization/Injection HPI: 
Arlene Franco is a 70y.o. year old male who is here for a follow up visit. He was last seen by me on 8/31/2018. He reports the following: Hypertension Patient has a history of HTN. The patient is taking hypertensive medications compliantly without side effects. Denies chest pain, dyspnea, edema, or symptoms of TIA's. BP Readings from Last 3 Encounters:  
11/27/18 120/82  
08/31/18 122/78  
05/25/18 138/82 Left ankle replaced-  
Michael 3. Cast for 2 months. Seeing ENT- acoustic neuroma May need gamma knife Blood sugars have been ok. At home 117/80 Lab Results Component Value Date/Time Hemoglobin A1c 6.5 (H) 09/06/2018 07:55 AM  
   
 
 
 
Assessment and Plan 1. Controlled type 2 diabetes mellitus without complication, without long-term current use of insulin ([address] Steady at 6.5. Getting ankle surgery next month.  
- METABOLIC PANEL, COMPREHENSIVE; Future - MICROALBUMIN, UR, RAND W/ MICROALB/CREAT RATIO; Future 
- UA/M W/RFLX CULTURE, ROUTINE; Future 
- HEMOGLOBIN A1C W/O EAG; Future - LIPID PANEL; Future 2. Encounter for immunization Immunization given. Discussed risks and benefits. Side effects. VIS given through visit summary via [employer] or paper copy if not on Mychart  
- INFLUENZA VACCINE INACTIVATED (IIV), SUBUNIT, ADJUVANTED, IM 
- ADMIN INFLUENZA VIRUS VAC 3. Essential hypertension Tolerating medication. Denies dizziness that is positional, SOB, or chest pain. Understands the importance of compliance to reduce risk of future heart failure. Agreed to call if any of above symptoms develop and  stay on current regimen of   
Key CAD CHF Meds   
    
  
 pravastatin (PRAVACHOL) 20 mg tablet  (Taking) Take 1 Tab by mouth daily. olmesartan (BENICAR) 20 mg tablet  (Taking) Take 1 Tab by mouth daily. aspirin delayed-release 81 mg tablet  (Taking) Take  by mouth daily. 4. Hyperlipidemia with target LDL less than 100 The nature of cardiac risk has been fully discussed with this patient. I have made him aware of his LDL target goal given his cardiovascular risk analysis. I have discussed the appropriate diet. The need for lifelong compliance in order to reduce risk is stressed. A regular exercise program is recommended to help achieve and maintain normal body weight, fitness and improve lipid balance. The risks and benefits of medications were discussed. Last cholesterol labs reviewed with patient. Patient made aware to get liver checked every 6 months. Continue with  pravachol Visit Vitals /82 (BP 1 Location: Left arm, BP Patient Position: Sitting) Pulse 72 Temp 97.8 °F (36.6 °C) (Oral) Resp 18 Ht 5' 9.75\" (1.772 m) Wt 186 lb 1.6 oz (84.4 kg) SpO2 94% BMI 26.89 kg/m² Historical Data Past Medical History:  
Diagnosis Date  Acoustic neuroma ([facility] [address] 5/28/2018  Blastomycosis 1990  
 (treated with Sporanox) -Left Lung  Diabetes ([facility] [address] Type 2  
 History of colonic polyps  Tinnitus Past Surgical History:  
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  
 several polyps  HX HEENT    
 tonsillectomy (age 10) [facility] Plantar fascia trimmed Outpatient Encounter Medications as of 11/27/2018 Medication Sig Dispense Refill  JANUVIA 100 mg tablet TAKE 1 TABLET DAILY 90 Tab 3  pravastatin (PRAVACHOL) 20 mg tablet Take 1 Tab by mouth daily. 90 Tab 2  
 olmesartan (BENICAR) 20 mg tablet Take 1 Tab by mouth daily. 90 Tab 3  
 metFORMIN (GLUCOPHAGE) 500 mg tablet TAKE 2 TABLETS TWICE A DAY WITH MEALS 180 Tab 3  
 aspirin delayed-release 81 mg tablet Take  by mouth daily.  glucose blood VI test strips (ONETOUCH ULTRA TEST) strip Use to check BS BID. DM E11.9 200 Strip 1  multivitamin (ONE A DAY) tablet Take 1 Tab by mouth daily.  [DISCONTINUED] metFORMIN (GLUCOPHAGE) 500 mg tablet TAKE 2 TABLETS TWICE A DAY WITH MEALS 180 Tab 3 No facility-administered encounter medications on file as of 11/27/2018. 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  
  Comment: rare to occasional  
 Drug use: No  
 Sexual activity: Yes  
  Partners: Female Other Topics Concern  Not on file Social History Narrative  Not on file  
  
 
family history includes Cancer in his father; Dementia in his mother; Diabetes in his father and mother; Psychiatric Disorder in his mother; Stroke in his father. Review of Systems Constitutional: Negative for weight loss. Eyes: Negative for blurred vision. Respiratory: Negative for shortness of breath. Cardiovascular: Negative for chest pain. Gastrointestinal: Negative for abdominal pain. Genitourinary: Negative for dysuria and frequency. Skin: Negative for rash. Neurological: Negative for dizziness, focal weakness, weakness and headaches. Endo/Heme/Allergies: Negative for environmental allergies. Does not bruise/bleed easily.   
 
 
 
Physical Exam  
 Constitutional: He appears well-developed and well-nourished. He is active. Non-toxic appearance. He does not have a sickly appearance. He does not appear ill. No distress. Eyes: Conjunctivae are normal. Right eye exhibits no discharge. Neck: Carotid bruit is not present. No thyroid mass and no thyromegaly present. Cardiovascular: Normal rate, regular rhythm, S1 normal, S2 normal, normal heart sounds and normal pulses. Exam reveals no gallop and no friction rub. Pulmonary/Chest: Effort normal and breath sounds normal. No respiratory distress. Abdominal: Soft. Bowel sounds are normal.  
Musculoskeletal: He exhibits no edema or deformity. Neurological: He is alert. Coordination normal.  
Skin: Skin is warm and dry. No rash noted. No pallor. Psychiatric: He has a normal mood and affect. His behavior is normal.  
Vitals reviewed. Ortho Exam 
 
 
Orders Placed This Encounter  Influenza Vaccine Inactivated (IIV)(FLUAD), Subunit, Adjuvanted, IM, (12501)  METABOLIC PANEL, COMPREHENSIVE Standing Status:   Future Standing Expiration Date:   5/26/2019  MICROALBUMIN, UR, RAND W/ MICROALB/CREAT RATIO Standing Status:   Future Standing Expiration Date:   5/26/2019  
 UA/M W/RFLX CULTURE, ROUTINE Standing Status:   Future Standing Expiration Date:   5/26/2019  
 HEMOGLOBIN A1C W/O EAG Standing Status:   Future Standing Expiration Date:   5/26/2019  LIPID PANEL Standing Status:   Future Standing Expiration Date:   5/26/2019  Administration fee () for Medicare insured patients I have reviewed the patient's medical history in detail and updated the computerized patient record. We had a prolonged discussion about these complex clinical issues and went over the various important aspects to consider. All questions were answered.   
 
Advised him to call back or return to office if symptoms do not improve, change in nature, or persist. 
 
 He was given an after visit summary or informed of [employer] Access which includes patient instructions, diagnoses, current medications, & vitals. He expressed understanding with the diagnosis and plan.

## 2018-11-27 NOTE — PATIENT INSTRUCTIONS
Vaccine Information Statement Influenza (Flu) Vaccine (Inactivated or Recombinant): What you need to know Many Vaccine Information Statements are available in [place] and other languages. See www.immunize.org/vis Hojas de Información Sobre Vacunas están disponibles en Español y en muchos otros idiomas. Visite www.immunize.org/vis 1. Why get vaccinated? Influenza (flu) is a contagious disease that spreads around the [place] every year, usually between October and May. Flu is caused by influenza viruses, and is spread mainly by coughing, sneezing, and close contact. Anyone can get flu. Flu strikes suddenly and can last several days. Symptoms vary by age, but can include: 
 fever/chills  sore throat  muscle aches  fatigue  cough  headache  runny or stuffy nose Flu can also lead to pneumonia and blood infections, and cause diarrhea and seizures in children. If you have a medical condition, such as heart or lung disease, flu can make it worse. Flu is more dangerous for some people. Infants and young children, people 72years of age and older, pregnant women, and people with certain health conditions or a weakened immune system are at greatest risk. Each year thousands of people in the [facility] die from flu, and many more are hospitalized. Flu vaccine can: 
 keep you from getting flu, 
 make flu less severe if you do get it, and 
 keep you from spreading flu to your family and other people. 2. Inactivated and recombinant flu vaccines A dose of flu vaccine is recommended every flu season. Children 6 months through 6years of age may need two doses during the same flu season. Everyone else needs only one dose each flu season.   
 
 
Some inactivated flu vaccines contain a very small amount of a mercury-based preservative called thimerosal. Studies have not shown thimerosal in vaccines to be harmful, but flu vaccines that do not contain thimerosal are available. There is no live flu virus in flu shots. They cannot cause the flu. There are many flu viruses, and they are always changing. Each year a new flu vaccine is made to protect against three or four viruses that are likely to cause disease in the upcoming flu season. But even when the vaccine doesnt exactly match these viruses, it may still provide some protection Flu vaccine cannot prevent: 
 flu that is caused by a virus not covered by the vaccine, or 
 illnesses that look like flu but are not. It takes about 2 weeks for protection to develop after vaccination, and protection lasts through the flu season. 3. Some people should not get this vaccine Tell the person who is giving you the vaccine:  If you have any severe, life-threatening allergies. If you ever had a life-threatening allergic reaction after a dose of flu vaccine, or have a severe allergy to any part of this vaccine, you may be advised not to get vaccinated. Most, but not all, types of flu vaccine contain a small amount of egg protein.  If you ever had Guillain-Barré Syndrome (also called GBS). Some people with a history of GBS should not get this vaccine. This should be discussed with your doctor.  If you are not feeling well. It is usually okay to get flu vaccine when you have a mild illness, but you might be asked to come back when you feel better. 4. Risks of a vaccine reaction With any medicine, including vaccines, there is a chance of reactions. These are usually mild and go away on their own, but serious reactions are also possible. Most people who get a flu shot do not have any problems with it. Minor problems following a flu shot include:  
 soreness, redness, or swelling where the shot was given  hoarseness  sore, red or itchy eyes  cough  fever  aches  headache  itching  fatigue If these problems occur, they usually begin soon after the shot and last 1 or 2 days. More serious problems following a flu shot can include the following:  There may be a small increased risk of Guillain-Barré Syndrome (GBS) after inactivated flu vaccine. This risk has been estimated at 1 or 2 additional cases per million people vaccinated. This is much lower than the risk of severe complications from flu, which can be prevented by flu vaccine.  Young children who get the flu shot along with pneumococcal vaccine (PCV13) and/or DTaP vaccine at the same time might be slightly more likely to have a seizure caused by fever. Ask your doctor for more information. Tell your doctor if a child who is getting flu vaccine has ever had a seizure. Problems that could happen after any injected vaccine:  People sometimes faint after a medical procedure, including vaccination. Sitting or lying down for about 15 minutes can help prevent fainting, and injuries caused by a fall. Tell your doctor if you feel dizzy, or have vision changes or ringing in the ears.  Some people get severe pain in the shoulder and have difficulty moving the arm where a shot was given. This happens very rarely.  Any medication can cause a severe allergic reaction. Such reactions from a vaccine are very rare, estimated at about 1 in a million doses, and would happen within a few minutes to a few hours after the vaccination. As with any medicine, there is a very remote chance of a vaccine causing a serious injury or death. The safety of vaccines is always being monitored. For more information, visit: www.cdc.gov/vaccinesafety/ 
 
5. What if there is a serious reaction? What should I look for?  Look for anything that concerns you, such as signs of a severe allergic reaction, very high fever, or unusual behavior.  
 
Signs of a severe allergic reaction can include hives, swelling of the face and throat, difficulty breathing, a fast heartbeat, dizziness, and weakness  usually within a few minutes to a few hours after the vaccination. What should I do?  If you think it is a severe allergic reaction or other emergency that cant wait, call 9-1-1 and get the person to the nearest hospital. Otherwise, call your doctor.  Reactions should be reported to the Vaccine Adverse Event Reporting System (VAERS). Your doctor should file this report, or you can do it yourself through  the VAERS web site at www.vaers. [facility].gov, or by calling 2-583.880.4123. VAERS does not give medical advice. 6. The National Vaccine Injury Compensation Program 
 
The [facility] Vaccine Injury Compensation Program (VICP) is a federal program that was created to compensate people who may have been injured by certain vaccines. Persons who believe they may have been injured by a vaccine can learn about the program and about filing a claim by calling 0-998.158.7309 or visiting the [employer] website at www.[facility].gov/vaccinecompensation. There is a time limit to file a claim for compensation. 7. How can I learn more?  Ask your healthcare provider. He or she can give you the vaccine package insert or suggest other sources of information.  Call your local or state health department.  Contact the Centers for Disease Control and Prevention (CDC): 
- Call 3-591.107.7664 (1-800-CDC-INFO) or 
- Visit CDCs website at www.cdc.gov/flu Vaccine Information Statement Inactivated Influenza Vaccine 8/7/2015 
[address] NORB[address] Bolanos [id number] Department of [facility] and [employer] Centers for Disease Control and Prevention Office Use Only

## 2018-12-26 ENCOUNTER — HOSPITAL ENCOUNTER (OUTPATIENT)
Dept: LAB | Age: 68
Discharge: HOME | End: 2018-12-26
Payer: MEDICARE

## 2018-12-26 PROCEDURE — 81001 URINALYSIS AUTO W/SCOPE: CPT

## 2018-12-26 PROCEDURE — 82043 UR ALBUMIN QUANTITATIVE: CPT

## 2018-12-26 PROCEDURE — 80061 LIPID PANEL: CPT

## 2018-12-26 PROCEDURE — 36415 COLL VENOUS BLD VENIPUNCTURE: CPT

## 2018-12-26 PROCEDURE — 80053 COMPREHEN METABOLIC PANEL: CPT

## 2018-12-26 PROCEDURE — 83036 HEMOGLOBIN GLYCOSYLATED A1C: CPT

## 2018-12-27 LAB
ALBUMIN SERPL-MCNC: 4.5 G/DL (ref 3.6–4.8)
ALBUMIN/CREAT UR: <3 MG/G CREAT (ref 0–30)
ALBUMIN/GLOB SERPL: 2 {RATIO} (ref 1.2–2.2)
ALP SERPL-CCNC: 89 IU/L (ref 39–117)
ALT SERPL-CCNC: 11 IU/L (ref 0–44)
APPEARANCE UR: CLEAR
AST SERPL-CCNC: 14 IU/L (ref 0–40)
BACTERIA #/AREA URNS HPF: NORMAL /[HPF]
BILIRUB SERPL-MCNC: 0.4 MG/DL (ref 0–1.2)
BILIRUB UR QL STRIP: NEGATIVE
BUN SERPL-MCNC: 11 MG/DL (ref 8–27)
BUN/CREAT SERPL: 13 (ref 10–24)
CALCIUM SERPL-MCNC: 9.8 MG/DL (ref 8.6–10.2)
CASTS URNS QL MICRO: NORMAL /LPF
CHLORIDE SERPL-SCNC: 102 MMOL/L (ref 96–106)
CHOLEST SERPL-MCNC: 122 MG/DL (ref 100–199)
CO2 SERPL-SCNC: 23 MMOL/L (ref 20–29)
COLOR UR: YELLOW
CREAT SERPL-MCNC: 0.84 MG/DL (ref 0.76–1.27)
CREAT UR-MCNC: 98.6 MG/DL
EPI CELLS #/AREA URNS HPF: NORMAL /HPF
GLOBULIN SER CALC-MCNC: 2.3 G/DL (ref 1.5–4.5)
GLUCOSE SERPL-MCNC: 188 MG/DL (ref 65–99)
GLUCOSE UR QL: NEGATIVE
HBA1C MFR BLD: 6.8 % (ref 4.8–5.6)
HDLC SERPL-MCNC: 40 MG/DL
HGB UR QL STRIP: NEGATIVE
INTERPRETATION, 910389: NORMAL
KETONES UR QL STRIP: NEGATIVE
LDLC SERPL CALC-MCNC: 67 MG/DL (ref 0–99)
LEUKOCYTE ESTERASE UR QL STRIP: NEGATIVE
Lab: NORMAL
MICRO URNS: NORMAL
MICRO URNS: NORMAL
MICROALBUMIN UR-MCNC: <3 UG/ML
MUCOUS THREADS URNS QL MICRO: PRESENT
NITRITE UR QL STRIP: NEGATIVE
PH UR STRIP: 5.5 [PH] (ref 5–7.5)
POTASSIUM SERPL-SCNC: 5 MMOL/L (ref 3.5–5.2)
PROT SERPL-MCNC: 6.8 G/DL (ref 6–8.5)
PROT UR QL STRIP: NEGATIVE
RBC #/AREA URNS HPF: NORMAL /HPF
SODIUM SERPL-SCNC: 140 MMOL/L (ref 134–144)
SP GR UR: 1.01 (ref 1–1.03)
TRIGL SERPL-MCNC: 73 MG/DL (ref 0–149)
URINALYSIS REFLEX, 377202: NORMAL
UROBILINOGEN UR STRIP-MCNC: 0.2 MG/DL (ref 0.2–1)
VLDLC SERPL CALC-MCNC: 15 MG/DL (ref 5–40)
WBC #/AREA URNS HPF: NORMAL /HPF

## 2018-12-31 NOTE — PROGRESS NOTES
The a1c did creep up to 6.8. Likely with the inactivity and surgery. The fasting number was 188. Is that consistently high like that for you these days? The other numbers looked great. Hope surgery went well. Message sent to patient via [employer]: 
December 31, 2018

## 2019-03-15 ENCOUNTER — HOSPITAL ENCOUNTER (OUTPATIENT)
Dept: LAB | Age: 69
Discharge: HOME | End: 2019-03-15

## 2019-03-15 ENCOUNTER — OFFICE VISIT (OUTPATIENT)
Dept: INTERNAL MEDICINE CLINIC | Age: 69
End: 2019-03-15

## 2019-03-15 VITALS
TEMPERATURE: 98.6 F | WEIGHT: 186 LBS | DIASTOLIC BLOOD PRESSURE: 80 MMHG | HEIGHT: 70 IN | HEART RATE: 66 BPM | RESPIRATION RATE: 16 BRPM | OXYGEN SATURATION: 98 % | SYSTOLIC BLOOD PRESSURE: 140 MMHG | BODY MASS INDEX: 26.63 KG/M2

## 2019-03-15 DIAGNOSIS — E78.5 HYPERLIPIDEMIA WITH TARGET LDL LESS THAN 100: ICD-10-CM

## 2019-03-15 DIAGNOSIS — Z12.5 PROSTATE CANCER SCREENING: ICD-10-CM

## 2019-03-15 DIAGNOSIS — Z13.31 SCREENING FOR DEPRESSION: ICD-10-CM

## 2019-03-15 DIAGNOSIS — E11.9 CONTROLLED TYPE 2 DIABETES MELLITUS WITHOUT COMPLICATION, WITHOUT LONG-TERM CURRENT USE OF INSULIN (HCC): Primary | ICD-10-CM

## 2019-03-15 DIAGNOSIS — Z00.00 MEDICARE ANNUAL WELLNESS VISIT, SUBSEQUENT: ICD-10-CM

## 2019-03-15 DIAGNOSIS — I10 ESSENTIAL HYPERTENSION: ICD-10-CM

## 2019-03-15 LAB — HBA1C MFR BLD HPLC: 6.5 %

## 2019-03-15 RX ORDER — PRAVASTATIN SODIUM 20 MG/1
20 TABLET ORAL DAILY
Qty: 90 TAB | Refills: 2 | Status: SHIPPED | OUTPATIENT
Start: 2019-03-15 | End: 2019-08-15 | Stop reason: SDUPTHER

## 2019-03-15 RX ORDER — METFORMIN HYDROCHLORIDE 500 MG/1
TABLET ORAL
Qty: 180 TAB | Refills: 3 | Status: SHIPPED | OUTPATIENT
Start: 2019-03-15 | End: 2019-08-01 | Stop reason: SDUPTHER

## 2019-03-15 RX ORDER — OLMESARTAN MEDOXOMIL 20 MG/1
20 TABLET ORAL DAILY
Qty: 90 TAB | Refills: 3 | Status: SHIPPED | OUTPATIENT
Start: 2019-03-15 | End: 2020-01-23 | Stop reason: SDUPTHER

## 2019-03-15 NOTE — PATIENT INSTRUCTIONS
Fingerstick today Hga1c (discussed its value with patient):  6.5      Office Visit on 03/15/2019   Component Date Value Ref Range Status    Hemoglobin A1c (POC) 03/15/2019 6.5  % Final   Office Visit on 11/27/2018   Component Date Value Ref Range Status    Glucose 12/26/2018 188* 65 - 99 mg/dL Final    BUN 12/26/2018 11  8 - 27 mg/dL Final    Creatinine 12/26/2018 0.84  0.76 - 1.27 mg/dL Final    GFR est non-AA 12/26/2018 89  >59 mL/min/1.73 Final    GFR est AA 12/26/2018 103  >59 mL/min/1.73 Final    BUN/Creatinine ratio 12/26/2018 13  10 - 24 Final    Sodium 12/26/2018 140  134 - 144 mmol/L Final    Potassium 12/26/2018 5.0  3.5 - 5.2 mmol/L Final    Chloride 12/26/2018 102  96 - 106 mmol/L Final    CO2 12/26/2018 23  20 - 29 mmol/L Final    Calcium 12/26/2018 9.8  8.6 - 10.2 mg/dL Final    Protein, total 12/26/2018 6.8  6.0 - 8.5 g/dL Final    Albumin 12/26/2018 4.5  3.6 - 4.8 g/dL Final    GLOBULIN, TOTAL 12/26/2018 2.3  1.5 - 4.5 g/dL Final    A-G Ratio 12/26/2018 2.0  1.2 - 2.2 Final    Bilirubin, total 12/26/2018 0.4  0.0 - 1.2 mg/dL Final    Alk. phosphatase 12/26/2018 89  39 - 117 IU/L Final    AST (SGOT) 12/26/2018 14  0 - 40 IU/L Final    ALT (SGPT) 12/26/2018 11  0 - 44 IU/L Final    Specific Gravity 12/26/2018 1.015  1.005 - 1.030 Final    pH (UA) 12/26/2018 5.5  5.0 - 7.5 Final    Color 12/26/2018 Yellow  Yellow Final    Appearance 12/26/2018 Clear  Clear Final    Leukocyte Esterase 12/26/2018 Negative  Negative Final    Protein 12/26/2018 Negative  Negative/Trace Final    Glucose 12/26/2018 Negative  Negative Final    Ketone 12/26/2018 Negative  Negative Final    Blood 12/26/2018 Negative  Negative Final    Bilirubin 12/26/2018 Negative  Negative Final    Urobilinogen 12/26/2018 0.2  0.2 - 1.0 mg/dL Final    Nitrites 12/26/2018 Negative  Negative Final    Microscopic Examination 12/26/2018 Comment   Final    Microscopic follows if indicated.     Microscopic exam 12/26/2018 See additional order   Final    Microscopic was indicated and was performed.  URINALYSIS REFLEX 12/26/2018 Comment   Final    This specimen will not reflex to a Urine Culture.  WBC 12/26/2018 0-5  0 - 5 /hpf Final    RBC 12/26/2018 0-2  0 - 2 /hpf Final    Epithelial cells 12/26/2018 None seen  0 - 10 /hpf Final    Casts 12/26/2018 None seen  None seen /lpf Final    Mucus 12/26/2018 Present  Not Estab. Final    Bacteria 12/26/2018 None seen  None seen/Few Final    Cholesterol, total 12/26/2018 122  100 - 199 mg/dL Final    Triglyceride 12/26/2018 73  0 - 149 mg/dL Final    HDL Cholesterol 12/26/2018 40  >39 mg/dL Final    VLDL, calculated 12/26/2018 15  5 - 40 mg/dL Final    LDL, calculated 12/26/2018 67  0 - 99 mg/dL Final    Creatinine, urine 12/26/2018 98.6  Not Estab. mg/dL Final    Microalbumin, urine 12/26/2018 <3.0  Not Estab. ug/mL Final    Microalb/Creat ratio (ug/mg creat.) 12/26/2018 <3.0  0.0 - 30.0 mg/g creat Final    Comment:                      Normal:                0.0 -  30.0                       Albuminuria:          31.0 - 300.0                       Clinical albuminuria:       >300.0      INTERPRETATION 12/26/2018 Note   Final    Supplemental report is available.     PDF Image [id number] Not applicable   Final    Hemoglobin A1c 12/26/2018 6.8* 4.8 - 5.6 % Final    Comment:          Prediabetes: 5.7 - 6.4           Diabetes: >6.4           Glycemic control for adults with diabetes: <7.0              Tiger balm

## 2019-03-15 NOTE — PROGRESS NOTES
Medication Evaluation       HPI:  Stephanie Rhodes is a 70y.o. year old male who is here for a follow up visit. He was last seen by me on 11/27/2018. He reports the following:    Doing well. Has few more days to wear splint. Exercising his feet to help circulation    a1c is 6.5 down from 6.8      Wants PSA checked today also    Hypertension    Patient has a history of HTN. The patient is taking hypertensive medications compliantly without side effects. Denies chest pain, dyspnea, edema, or symptoms of TIA's. BP Readings from Last 3 Encounters:   03/15/19 140/80   11/27/18 120/82   08/31/18 122/78         Hyperlipidemia    Patient has history of hyperlipidemia that is being managed with medications. He has been taking his statin cholesterol medication regularly without side effects such as myalgias or upper abdominal pain, nausea or jaundice. Lab Results   Component Value Date/Time    Cholesterol, total 122 12/26/2018 07:34 AM    HDL Cholesterol 40 12/26/2018 07:34 AM    LDL, calculated 67 12/26/2018 07:34 AM    VLDL, calculated 15 12/26/2018 07:34 AM    Triglyceride 73 12/26/2018 07:34 AM             Assessment and Plan        1. Controlled type 2 diabetes mellitus without complication, without long-term current use of insulin (HCC)  Improvement. Not exercising because of splint on foot. Should get better with next visit. Continue present management. No changes made to regimen. - AMB POC HEMOGLOBIN A1C  - SITagliptin (JANUVIA) 100 mg tablet; TAKE 1 TABLET DAILY  Dispense: 90 Tab; Refill: 3  - pravastatin (PRAVACHOL) 20 mg tablet; Take 1 Tab by mouth daily. Dispense: 90 Tab; Refill: 2  - metFORMIN (GLUCOPHAGE) 500 mg tablet; TAKE 2 TABLETS TWICE A DAY WITH MEALS  Dispense: 180 Tab; Refill: 3    2. Prostate cancer screening  The natural history of prostate cancer and ongoing controversy regarding screening and potential treatment outcomes of prostate cancer has been discussed with the patient.  The meaning of a false positive PSA and a false negative PSA has been discussed. He indicates understanding of the limitations of this screening test and wishes  to proceed with screening PSA testing. Patient aware of the possible long term complications of prostate treatment that include impotence, urine incontinence, and rectal bleeding. At a certain age, it may be advisable to stop checking as studies have shown that patients succumb to other causes of death before a prostate cancer can cause death. - PSA SCREENING (SCREENING)    3. Hyperlipidemia with target LDL less than 100  The nature of cardiac risk has been fully discussed with this patient. I have made him aware of his LDL target goal given his cardiovascular risk analysis. I have discussed the appropriate diet. The need for lifelong compliance in order to reduce risk is stressed. A regular exercise program is recommended to help achieve and maintain normal body weight, fitness and improve lipid balance. The risks and benefits of medications were discussed. Last cholesterol labs reviewed with patient. Patient made aware to get liver checked every 6 months. Continue with  pravachol    4. Essential hypertension  Slightly high today but stay on 20 mg. Should improve when back to exercising  - CBC WITH AUTOMATED DIFF  - METABOLIC PANEL, COMPREHENSIVE  - olmesartan (BENICAR) 20 mg tablet; Take 1 Tab by mouth daily. Dispense: 90 Tab;  Refill: 3              Visit Vitals  /80 (BP 1 Location: Left arm, BP Patient Position: Sitting)   Pulse 66   Temp 98.6 °F (37 °C) (Oral)   Resp 16   Ht 5' 9.75\" (1.772 m)   Wt 186 lb (84.4 kg)   SpO2 98%   BMI 26.88 kg/m²       Historical Data    Past Medical History:   Diagnosis Date    Acoustic neuroma ([facility] [address] 5/28/2018    Blastomycosis 1990    (treated with Sporanox) -Left Lung    Diabetes ([facility] [address]     Type 2    History of colonic polyps     Tinnitus        Past Surgical History:   Procedure Laterality Date  HX ANKLE FRACTURE TX Right 01/2018    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 90 Tab 2    olmesartan (BENICAR) 20 mg tablet Take 1 Tab by mouth daily. 90 Tab 3    metFORMIN (GLUCOPHAGE) 500 mg tablet TAKE 2 TABLETS TWICE A DAY WITH MEALS 180 Tab 3    aspirin delayed-release 81 mg tablet Take  by mouth daily.  glucose blood VI test strips (ONETOUCH ULTRA TEST) strip Use to check BS BID. DM E11.9 200 Strip 1    multivitamin (ONE A DAY) tablet Take 1 Tab by mouth daily.  [DISCONTINUED] JANUVIA 100 mg tablet TAKE 1 TABLET DAILY 90 Tab 3    [DISCONTINUED] pravastatin (PRAVACHOL) 20 mg tablet Take 1 Tab by mouth daily. 90 Tab 2    [DISCONTINUED] olmesartan (BENICAR) 20 mg tablet Take 1 Tab by mouth daily. 90 Tab 3    [DISCONTINUED] metFORMIN (GLUCOPHAGE) 500 mg tablet TAKE 2 TABLETS TWICE A DAY WITH MEALS 180 Tab 3     No facility-administered encounter medications on file as of 3/15/2019.          Allergies   Allergen Reactions    Iodine Nausea Only     Shrimp        Social History     Socioeconomic History    Marital status:      Spouse name: Not on file    Number of children: Not on file    Years of education: Not on file    Highest education level: Not on file   Social Needs    Financial resource strain: Not on file    Food insecurity - worry: Not on file    Food insecurity - inability: Not on file    Transportation needs - medical: Not on file   [employer] needs - non-medical: Not on file   Occupational History    Occupation: defense      Employer: [employer]   Tobacco Use    Smoking status: Never Smoker    Smokeless tobacco: Never Used   Substance and Sexual Activity    Alcohol use: Yes     Alcohol/week: 0.0 oz     Comment: rare to occasional    Drug use: No    Sexual activity: Yes     Partners: Female   Other Topics Concern    Not on file   Social History Narrative    Not on file        family history includes Cancer in his father; Dementia in his mother; Diabetes in his father and mother; Psychiatric Disorder in his mother; Stroke in his father. Review of Systems   Constitutional: Negative for weight loss. Eyes: Negative for blurred vision. Respiratory: Negative for shortness of breath. Cardiovascular: Negative for chest pain. Gastrointestinal: Negative for abdominal pain. Genitourinary: Negative for dysuria and frequency. Skin: Negative for rash. Neurological: Negative for dizziness, focal weakness, weakness and headaches. Endo/Heme/Allergies: Negative for environmental allergies. Does not bruise/bleed easily. Physical Exam   Constitutional: He appears well-developed and well-nourished. He is active. Non-toxic appearance. He does not have a sickly appearance. He does not appear ill. No distress. Eyes: Conjunctivae are normal. Right eye exhibits no discharge. Neck: Carotid bruit is not present. No thyroid mass and no thyromegaly present. Cardiovascular: Normal rate, regular rhythm, S1 normal, S2 normal, normal heart sounds and normal pulses. Exam reveals no gallop and no friction rub. Pulmonary/Chest: Effort normal and breath sounds normal. No respiratory distress. Abdominal: Soft. Bowel sounds are normal.   Musculoskeletal: He exhibits no edema or deformity. Back:    Neurological: He is alert. Coordination normal.   Skin: Skin is warm and dry. No rash noted. No pallor. Psychiatric: He has a normal mood and affect. His behavior is normal.   Vitals reviewed.      Ortho Exam      Orders Placed This Encounter    PSA SCREENING (SCREENING)    CBC WITH AUTOMATED DIFF    METABOLIC PANEL, COMPREHENSIVE    AMB POC HEMOGLOBIN A1C    SITagliptin (JANUVIA) 100 mg tablet     Sig: TAKE 1 TABLET DAILY     Dispense:  90 Tab     Refill:  3    pravastatin (PRAVACHOL) 20 mg tablet     Sig: Take 1 Tab by mouth daily. Dispense:  90 Tab     Refill:  2    olmesartan (BENICAR) 20 mg tablet     Sig: Take 1 Tab by mouth daily. Dispense:  90 Tab     Refill:  3    metFORMIN (GLUCOPHAGE) 500 mg tablet     Sig: TAKE 2 TABLETS TWICE A DAY WITH MEALS     Dispense:  180 Tab     Refill:  3        I have reviewed the patient's medical history in detail and updated the computerized patient record. We had a prolonged discussion about these complex clinical issues and went over the various important aspects to consider. All questions were answered. Advised him to call back or return to office if symptoms do not improve, change in nature, or persist.      Lonnie Davenport is a 70 y.o. male and presents for annual Medicare Wellness Visit. Assessment of cognitive impairment: Alert and oriented x 3. Patient denies concerns about cognitive impairment. Problem List: Reviewed with patient and discussed risk factors. Patient Active Problem List   Diagnosis Code    Diabetes mellitus type 2, controlled, without complications ([facility] [address] G19.2    H/O blastomycosis Z86.19    Hyperlipidemia with target LDL less than 100 E78.5    Acoustic neuroma ([facility] [address] D33.3       Current medical providers:  Patient Care Team:  Rachel Starks MD as PCP - General (Internal Medicine)        End of Life Planning: This was discussed with him today and he has an advanced directive - a copy has been provided. Reviewed DNR/DNI and patient is not interested. Depression Screen: Reviewed PQH2 done by nurse.   3 most recent PHQ Screens 3/15/2019   Little interest or pleasure in doing things Not at all   Feeling down, depressed, irritable, or hopeless Not at all   Total Score PHQ 2 0       Fall Risk:   Fall Risk Assessment, last 12 mths 11/27/2018   Able to walk? Yes   Fall in past 12 months? No       Home Safety - discussion completed. No issues found. Hearing Loss:  Has acoustic neuroma    Activities of Daily Living:  Self-care. Requires assistance with: no ADLs    Adult Nutrition Screen:  No risk factors noted. Health Maintenance- Reviewed    AAA Screening:  Glaucoma Screening:       Health Maintenance Due   Topic Date Due    COLONOSCOPY  10/07/1967    Shingrix Vaccine Age 50> (1 of 2) 10/07/1999    FOOT EXAM Q1  01/12/2018    MEDICARE YEARLY EXAM  09/26/2018        Health Maintenance reviewed -  Plan:      Orders Placed This Encounter    PSA SCREENING (SCREENING)    CBC WITH AUTOMATED DIFF    METABOLIC PANEL, COMPREHENSIVE    AMB POC HEMOGLOBIN A1C    SITagliptin (JANUVIA) 100 mg tablet    pravastatin (PRAVACHOL) 20 mg tablet    olmesartan (BENICAR) 20 mg tablet    metFORMIN (GLUCOPHAGE) 500 mg tablet           Plan:    Diagnoses and all orders for this visit:    1. Controlled type 2 diabetes mellitus without complication, without long-term current use of insulin (HCC)  -     AMB POC HEMOGLOBIN A1C  -     SITagliptin (JANUVIA) 100 mg tablet; TAKE 1 TABLET DAILY  -     pravastatin (PRAVACHOL) 20 mg tablet; Take 1 Tab by mouth daily. -     metFORMIN (GLUCOPHAGE) 500 mg tablet; TAKE 2 TABLETS TWICE A DAY WITH MEALS    2. Prostate cancer screening  -     PSA SCREENING (SCREENING)    3. Hyperlipidemia with target LDL less than 100    4. Essential hypertension  -     CBC WITH AUTOMATED DIFF  -     METABOLIC PANEL, COMPREHENSIVE  -     olmesartan (BENICAR) 20 mg tablet; Take 1 Tab by mouth daily. 5. Medicare annual wellness visit, subsequent    6.  Screening for depression      Orders Placed This Encounter    PSA SCREENING (SCREENING)    CBC WITH AUTOMATED DIFF    METABOLIC PANEL, COMPREHENSIVE    AMB POC HEMOGLOBIN A1C    SITagliptin (JANUVIA) 100 mg tablet    pravastatin (PRAVACHOL) 20 mg tablet    olmesartan (BENICAR) 20 mg tablet    metFORMIN (GLUCOPHAGE) 500 mg tablet         Follow-up Disposition:  Return if symptoms worsen or fail to improve. Reviewed with patient the treatment plan, goals of treatment plan, and limitations of treatment plan, to include the potential for side effects from medications and procedures. If side effects occur, it is the responsibility of the patient to inform the clinic so that a change in the treatment plan can be made in a safe manner. The patient is advised that stopping prescribed medication may cause an increase in symptoms and possible medication withdrawal symptoms. The patient is informed an emergency room evaluation may be necessary if this occurs. Patient verbalized understanding and is in agreement with treatment plan as outlined above. All questions answered. He was given an after visit summary or informed of [employer] Access which includes patient instructions, diagnoses, current medications, & vitals. He expressed understanding with the diagnosis and plan.

## 2019-03-15 NOTE — PROGRESS NOTES
Chief Complaint   Patient presents with    Medication Evaluation     Reviewed record in preparation for visit and have obtained necessary documentation. Identified pt with two pt identifiers(name and ). Health Maintenance Due   Topic    COLONOSCOPY     Shingrix Vaccine Age 50> (1 of 2)    FOOT EXAM Q1     MEDICARE YEARLY EXAM          Chief Complaint   Patient presents with    Medication Evaluation        Wt Readings from Last 3 Encounters:   03/15/19 186 lb (84.4 kg)   18 186 lb 1.6 oz (84.4 kg)   18 191 lb (86.6 kg)     Temp Readings from Last 3 Encounters:   03/15/19 98.6 °F (37 °C) (Oral)   18 97.8 °F (36.6 °C) (Oral)   18 98 °F (36.7 °C) (Oral)     BP Readings from Last 3 Encounters:   03/15/19 140/80   18 120/82   18 122/78     Pulse Readings from Last 3 Encounters:   03/15/19 66   18 72   18 62           Learning Assessment:  :     Learning Assessment 2013   PRIMARY LEARNER Patient Patient Patient Patient Patient Patient   HIGHEST LEVEL OF EDUCATION - PRIMARY LEARNER  - - > 4 YEARS OF COLLEGE > 4 YEARS OF COLLEGE - -   BARRIERS PRIMARY LEARNER - - NONE NONE - -   PRIMARY LANGUAGE ENGLISH ENGLISH ENGLISH ENGLISH ENGLISH ENGLISH   LEARNER PREFERENCE PRIMARY DEMONSTRATION DEMONSTRATION DEMONSTRATION OTHER (COMMENT) DEMONSTRATION READING     - - - - READING OTHER (COMMENT)     - - - - LISTENING -     - - - - PICTURES -     - - - - VIDEOS -   ANSWERED BY patient Patient  patient Self patient patient   RELATIONSHIP SELF SELF SELF SELF SELF SELF       Depression Screening:  :     3 most recent PHQ Screens 3/15/2019   Little interest or pleasure in doing things Not at all   Feeling down, depressed, irritable, or hopeless Not at all   Total Score PHQ 2 0       Fall Risk Assessment:  :     Fall Risk Assessment, last 12 mths 2018   Able to walk? Yes   Fall in past 12 months?  No       Abuse Screening:  :     Abuse Screening Questionnaire 8/31/2018 1/5/2016 1/27/2015 6/10/2014   Do you ever feel afraid of your partner? N N N N   Are you in a relationship with someone who physically or mentally threatens you? N N N N   Is it safe for you to go home? Melo Gallardo       Coordination of Care Questionnaire:  :     1) Have you been to an emergency room, urgent care clinic since your last visit? yes   Hospitalized since your last visit? yes             2) Have you seen or consulted any other health care providers outside of [address] [place] [address] since your last visit? yes  (Include any pap smears or colon screenings in this section.)    3) Do you have an Advance Directive on file? no    4) Are you interested in receiving information on Advance Directives? NO      Patient is accompanied by self I have received verbal consent from Fariba Somervell to discuss any/all medical information while they are present in the room. Reviewed record  In preparation for visit and have obtained necessary documentation.

## 2019-03-16 LAB
ALBUMIN SERPL-MCNC: 4.7 G/DL (ref 3.6–4.8)
ALBUMIN/GLOB SERPL: 2 {RATIO} (ref 1.2–2.2)
ALP SERPL-CCNC: 90 IU/L (ref 39–117)
ALT SERPL-CCNC: 13 IU/L (ref 0–44)
AST SERPL-CCNC: 13 IU/L (ref 0–40)
BASOPHILS # BLD AUTO: 0 X10E3/UL (ref 0–0.2)
BASOPHILS NFR BLD AUTO: 0 %
BILIRUB SERPL-MCNC: 0.2 MG/DL (ref 0–1.2)
BUN SERPL-MCNC: 13 MG/DL (ref 8–27)
BUN/CREAT SERPL: 15 (ref 10–24)
CALCIUM SERPL-MCNC: 10 MG/DL (ref 8.6–10.2)
CHLORIDE SERPL-SCNC: 101 MMOL/L (ref 96–106)
CO2 SERPL-SCNC: 26 MMOL/L (ref 20–29)
CREAT SERPL-MCNC: 0.89 MG/DL (ref 0.76–1.27)
EOSINOPHIL # BLD AUTO: 0.3 X10E3/UL (ref 0–0.4)
EOSINOPHIL NFR BLD AUTO: 3 %
ERYTHROCYTE [DISTWIDTH] IN BLOOD BY AUTOMATED COUNT: 13.7 % (ref 12.3–15.4)
GLOBULIN SER CALC-MCNC: 2.3 G/DL (ref 1.5–4.5)
GLUCOSE SERPL-MCNC: 147 MG/DL (ref 65–99)
HCT VFR BLD AUTO: 40 % (ref 37.5–51)
HGB BLD-MCNC: 13.7 G/DL (ref 13–17.7)
IMM GRANULOCYTES # BLD AUTO: 0.1 X10E3/UL (ref 0–0.1)
IMM GRANULOCYTES NFR BLD AUTO: 1 %
LYMPHOCYTES # BLD AUTO: 1.5 X10E3/UL (ref 0.7–3.1)
LYMPHOCYTES NFR BLD AUTO: 20 %
MCH RBC QN AUTO: 30.4 PG (ref 26.6–33)
MCHC RBC AUTO-ENTMCNC: 34.3 G/DL (ref 31.5–35.7)
MCV RBC AUTO: 89 FL (ref 79–97)
MONOCYTES # BLD AUTO: 0.6 X10E3/UL (ref 0.1–0.9)
MONOCYTES NFR BLD AUTO: 8 %
NEUTROPHILS # BLD AUTO: 5 X10E3/UL (ref 1.4–7)
NEUTROPHILS NFR BLD AUTO: 68 %
PLATELET # BLD AUTO: 230 X10E3/UL (ref 150–379)
POTASSIUM SERPL-SCNC: 4.6 MMOL/L (ref 3.5–5.2)
PROT SERPL-MCNC: 7 G/DL (ref 6–8.5)
PSA SERPL-MCNC: 1.1 NG/ML (ref 0–4)
RBC # BLD AUTO: 4.51 X10E6/UL (ref 4.14–5.8)
SODIUM SERPL-SCNC: 141 MMOL/L (ref 134–144)
WBC # BLD AUTO: 7.3 X10E3/UL (ref 3.4–10.8)

## 2019-04-02 NOTE — TELEPHONE ENCOUNTER
Requested Prescriptions     Pending Prescriptions Disp Refills    glucose blood VI test strips (ONETOUCH ULTRA TEST) strip 200 Strip 1     Sig: Use to check BS BID. DM E11.9     Patient would like as many refills as possible.   03/15/2019  [facility] upcoming  [facility]

## 2019-04-04 NOTE — TELEPHONE ENCOUNTER
Pt wants tiffany to call him at [phone] this is about his test strip  Because of his ins he has to test his blood sugar once per day and need 100 strip

## 2019-04-05 NOTE — TELEPHONE ENCOUNTER
Patient called back because the pharmacy has not received the updated script. Patient would like a call back once this has been corrected.

## 2019-06-11 ENCOUNTER — OFFICE VISIT (OUTPATIENT)
Dept: FAMILY MEDICINE CLINIC | Age: 69
End: 2019-06-11

## 2019-06-11 VITALS
HEART RATE: 71 BPM | HEIGHT: 70 IN | WEIGHT: 188.8 LBS | RESPIRATION RATE: 18 BRPM | OXYGEN SATURATION: 98 % | TEMPERATURE: 98.5 F | BODY MASS INDEX: 27.03 KG/M2 | SYSTOLIC BLOOD PRESSURE: 136 MMHG | DIASTOLIC BLOOD PRESSURE: 88 MMHG

## 2019-06-11 DIAGNOSIS — I10 HTN, GOAL BELOW 140/90: ICD-10-CM

## 2019-06-11 DIAGNOSIS — E11.9 CONTROLLED TYPE 2 DIABETES MELLITUS WITHOUT COMPLICATION, WITHOUT LONG-TERM CURRENT USE OF INSULIN (HCC): ICD-10-CM

## 2019-06-11 DIAGNOSIS — D33.3 ACOUSTIC NEUROMA (HCC): ICD-10-CM

## 2019-06-11 DIAGNOSIS — Z76.89 ENCOUNTER TO ESTABLISH CARE WITH NEW DOCTOR: Primary | ICD-10-CM

## 2019-06-11 LAB — HBA1C MFR BLD HPLC: 6.7 %

## 2019-06-11 NOTE — PROGRESS NOTES
Chief Complaint   Patient presents with    New Patient   [address]     has question regarding taking aspirin     1. Have you been to the ER, urgent care clinic since your last visit? Hospitalized since your last visit? No    2. Have you seen or consulted any other health care providers outside of the [facility] since your last visit? Include any pap smears or colon screening.  No

## 2019-06-11 NOTE — PROGRESS NOTES
Patient Name: Hamida Lemus   MRN: [id number]    David Chandler is a 70 y.o. male who presents with the following: Here to establish care with new PCP. He is seen for diabetes, hypertension and hyperlipidemia. Since last visit he reports no chest pain, dyspnea or TIA's, no numbness, tingling or pain in extremities, no unusual visual symptoms, no hypoglycemia, no significant changes. Home glucose monitoring: is not performed. He reports medication compliance: compliant all of the time. Medication side effects: none. Diabetic diet compliance: compliant most of the time. Lab review: A1c today is 6.7. Eye exam: UTD. Wondering if he still needs to take baby ASA daily. Followed by ENT for acoustic neuroma; will be undergoing gamma knife. Due for colonoscopy; pt will schedule. Lab Results   Component Value Date/Time    Hemoglobin A1c 6.8 (H) 12/26/2018 07:34 AM    Hemoglobin A1c (POC) 6.5 03/15/2019 02:59 PM       Review of Systems   Constitutional: Negative for fever, malaise/fatigue and weight loss. Respiratory: Negative for cough, hemoptysis, shortness of breath and wheezing. Cardiovascular: Negative for chest pain, palpitations, leg swelling and PND. Gastrointestinal: Negative for abdominal pain, constipation, diarrhea, nausea and vomiting. The patient's medications, allergies, past medical history, surgical history, family history and social history were reviewed and updated where appropriate. Prior to Admission medications    Medication Sig Start Date End Date Taking? Authorizing Provider   glucose blood VI test strips (ONETOUCH ULTRA TEST) strip Use to check blood sugar once daily. DM E11.9 4/5/19  Yes Marcello Copeland NP   SITagliptin (JANUVIA) 100 mg tablet TAKE 1 TABLET DAILY 3/15/19  Yes Carlos Eckert MD   pravastatin (PRAVACHOL) 20 mg tablet Take 1 Tab by mouth daily. 3/15/19  Yes Carlos Eckert MD   olmesartan (BENICAR) 20 mg tablet Take 1 Tab by mouth daily. 3/15/19  Yes Filomena Sahni MD   metFORMIN (GLUCOPHAGE) 500 mg tablet TAKE 2 TABLETS TWICE A DAY WITH MEALS 3/15/19  Yes Filomena Sahni MD   aspirin delayed-release 81 mg tablet Take 81 mg by mouth daily. Yes Provider, Historical   multivitamin (ONE A DAY) tablet Take 1 Tab by mouth daily. Yes Provider, Historical       Allergies   Allergen Reactions    Iodine Nausea Only     Shrimp         Past Medical History:   Diagnosis Date    Acoustic neuroma ([facility] [address] 05/28/2018    left ear    Blastomycosis 1990    (treated with Sporanox) -Left Lung    Diabetes mellitus type 2, controlled, without complications ([facility] [address] 68/19/5177    History of colonic polyps     HTN, goal below 140/90 6/11/2019       Past Surgical History:   Procedure Laterality Date    HX ANKLE FRACTURE TX Right 01/2018    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    HX ANKLE FRACTURE TX Left 01/2019    HX COLONOSCOPY  2005    several polyps    HX HEENT      tonsillectomy (age 10)   [de-identified] ORTHOPAEDIC  45    L Plantar fascia trimmed       Family History   Problem Relation Age of Onset    Cancer Father         prostate    Diabetes Father         adult-onset    Stroke Father     Dementia Mother     Psychiatric Disorder Mother     Diabetes Mother        Social History     Socioeconomic History    Marital status:      Spouse name: Not on file    Number of children: Not on file    Years of education: Not on file    Highest education level: Not on file   Occupational History    Occupation: defense      Employer: [employer]   Social Needs    Financial resource strain: Not on file    Food insecurity:     Worry: Not on file     Inability: Not on file    Transportation needs:     Medical: Not on file     Non-medical: Not on file   Tobacco Use    Smoking status: Never Smoker    Smokeless tobacco: Never Used   Substance and Sexual Activity    Alcohol use:  Yes     Alcohol/week: 0.0 oz     Comment: rare to occasional    Drug use: No    Sexual activity: Yes     Partners: Female   Lifestyle    Physical activity:     Days per week: Not on file     Minutes per session: Not on file    Stress: Not on file   Relationships    Social connections:     Talks on phone: Not on file     Gets together: Not on file     Attends Confucianism service: Not on file     Active member of club or organization: Not on file     Attends meetings of clubs or organizations: Not on file     Relationship status: Not on file    Intimate partner violence:     Fear of current or ex partner: Not on file     Emotionally abused: Not on file     Physically abused: Not on file     Forced sexual activity: Not on file   Other Topics Concern    Not on file   Social History Narrative    Not on file         OBJECTIVE    Visit Vitals  /88 (BP 1 Location: Left arm, BP Patient Position: Sitting)   Pulse 71   Temp 98.5 °F (36.9 °C) (Oral)   Resp 18   Ht 5' 9.75\" (1.772 m)   Wt 188 lb 12.8 oz (85.6 kg)   SpO2 98%   BMI 27.28 kg/m²       Physical Exam   Constitutional: He is oriented to person, place, and time and well-developed, well-nourished, and in no distress. No distress. HENT:   Head: Normocephalic. Right Ear: External ear normal.   Left Ear: External ear normal.   Eyes: Pupils are equal, round, and reactive to light. Conjunctivae and EOM are normal.   Cardiovascular: Normal rate, regular rhythm, normal heart sounds and intact distal pulses. Exam reveals no gallop and no friction rub. No murmur heard. Pulmonary/Chest: Effort normal and breath sounds normal. No respiratory distress. He has no wheezes. He has no rales. Abdominal: Soft. Bowel sounds are normal. He exhibits no distension. There is no tenderness. There is no rebound and no guarding. Neurological: He is alert and oriented to person, place, and time. Skin: Skin is warm and dry. He is not diaphoretic.    Psychiatric: Memory, affect and judgment normal.         ASSESSMENT AND RAYMUNDO Bermudez is a 70 y.o. male who presents today for:    1. Encounter to establish care with new doctor    2. Controlled type 2 diabetes mellitus without complication, without long-term current use of insulin (HCC)  Stable, continue current treatment. Recommend daily ASA 81 mg for ASCVD risk reduction. I have reviewed/discussed the above normal BMI with the patient. I have recommended the following interventions: dietary management education, guidance, and counseling, encourage exercise, monitor weight and prescribed dietary intake. - AMB POC HEMOGLOBIN A1C    3. HTN, goal below 140/90  Stable, continue current treatment. 4. Acoustic neuroma ([address]       Medications Discontinued During This Encounter   Medication Reason    pravastatin (PRAVACHOL) 20 mg tablet Duplicate Order       Follow-up and Dispositions    · Return in about 3 months (around 9/11/2019) for DM follow up. Time: 25 minutes was spent with this patient face to face discussing test results, follow up visits, and when repeat testing. I discussed diagnoses, risk factors and treatment for each based on current recommendations and literature. Greater than 50% of total visit time was spent in counseling and coordination of care. Medication risks/benefits/costs/interactions/alternatives discussed with patient. Advised patient to call back or return to office if symptoms worsen/change/persist. If patient cannot reach us or should anything more severe/urgent arise he/she should proceed directly to the nearest emergency department. Discussed expected course/resolution/complications of diagnosis in detail with patient. Patient given a written after visit summary which includes his/her diagnoses, current medications and vitals. Patient expressed understanding with the diagnosis and plan. Rom Nieto M.D. Diagnoses and all orders for this visit:    1. Encounter to establish care with new doctor    2.  Controlled type 2 diabetes mellitus without complication, without long-term current use of insulin ([facility])  Assessment & Plan:  Stable, based on history, physical exam and review of pertinent labs, studies and medications; meds reconciled; continue current treatment plan. Key Antihyperglycemic Medications             SITagliptin (JANUVIA) 100 mg tablet (Taking) TAKE 1 TABLET DAILY    metFORMIN (GLUCOPHAGE) 500 mg tablet (Taking) TAKE 2 TABLETS TWICE A DAY WITH MEALS        Other Key Diabetic Medications             pravastatin (PRAVACHOL) 20 mg tablet (Taking) Take 1 Tab by mouth daily. olmesartan (BENICAR) 20 mg tablet (Taking) Take 1 Tab by mouth daily. Lab Results   Component Value Date/Time    Hemoglobin A1c 6.8 12/26/2018 07:34 AM    Hemoglobin A1c (POC) 6.5 03/15/2019 02:59 PM    Glucose 147 03/15/2019 03:15 PM    Creatinine 0.89 03/15/2019 03:15 PM    Microalb/Creat ratio (ug/mg creat.) <3.0 12/26/2018 07:34 AM    Cholesterol, total 122 12/26/2018 07:34 AM    HDL Cholesterol 40 12/26/2018 07:34 AM    LDL, calculated 67 12/26/2018 07:34 AM    Triglyceride 73 12/26/2018 07:34 AM     Diabetic Foot and Eye Exam HM Status   Topic Date Due    Diabetic Foot Care  01/01/2020 (Originally 1/12/2018)   [address] [facility] Exam  11/02/2020       Orders:  -     AMB POC HEMOGLOBIN A1C    3. HTN, goal below 140/90    4. Acoustic neuroma [facility])  Assessment & Plan: This condition is managed by Specialist.  Key Oncology Meds     Patient is on no Oncologic meds. Key Pain Meds     The patient is on no pain meds. Lab Results   Component Value Date/Time    WBC 7.3 03/15/2019 03:15 PM    ABS.  NEUTROPHILS 5.0 03/15/2019 03:15 PM    HGB 13.7 03/15/2019 03:15 PM    HCT 40.0 03/15/2019 03:15 PM    PLATELET 498 31/51/4412 03:15 PM    Creatinine 0.89 03/15/2019 03:15 PM    BUN 13 03/15/2019 03:15 PM    ALT (SGPT) 13 03/15/2019 03:15 PM    AST (SGOT) 13 03/15/2019 03:15 PM    Albumin 4.7 03/15/2019 03:15 PM    Prostate Specific Ag 1.1 03/15/2019 03:15 PM         Other orders  -     varicella-zoster recombinant, PF, (SHINGRIX, PF,) 50 mcg/0.5 mL susr injection; 0.5 mL by IntraMUSCular route once for 1 dose.  Please fax vaccination documentation to [facility] at [phone], Attn: Dr. Iris Ny

## 2019-06-11 NOTE — ASSESSMENT & PLAN NOTE
Stable, based on history, physical exam and review of pertinent labs, studies and medications; meds reconciled; continue current treatment plan. Key Antihyperglycemic Medications             SITagliptin (JANUVIA) 100 mg tablet (Taking) TAKE 1 TABLET DAILY    metFORMIN (GLUCOPHAGE) 500 mg tablet (Taking) TAKE 2 TABLETS TWICE A DAY WITH MEALS        Other Key Diabetic Medications             pravastatin (PRAVACHOL) 20 mg tablet (Taking) Take 1 Tab by mouth daily. olmesartan (BENICAR) 20 mg tablet (Taking) Take 1 Tab by mouth daily.         Lab Results   Component Value Date/Time    Hemoglobin A1c 6.8 12/26/2018 07:34 AM    Hemoglobin A1c (POC) 6.5 03/15/2019 02:59 PM    Glucose 147 03/15/2019 03:15 PM    Creatinine 0.89 03/15/2019 03:15 PM    Microalb/Creat ratio (ug/mg creat.) <3.0 12/26/2018 07:34 AM    Cholesterol, total 122 12/26/2018 07:34 AM    HDL Cholesterol 40 12/26/2018 07:34 AM    LDL, calculated 67 12/26/2018 07:34 AM    Triglyceride 73 12/26/2018 07:34 AM     Diabetic Foot and Eye Exam HM Status   Topic Date Due    Diabetic Foot Care  01/01/2020 (Originally 1/12/2018)   [facility] Exam  11/02/2020

## 2019-06-11 NOTE — PATIENT INSTRUCTIONS

## 2019-08-01 DIAGNOSIS — E11.9 CONTROLLED TYPE 2 DIABETES MELLITUS WITHOUT COMPLICATION, WITHOUT LONG-TERM CURRENT USE OF INSULIN (HCC): ICD-10-CM

## 2019-08-01 RX ORDER — METFORMIN HYDROCHLORIDE 500 MG/1
TABLET ORAL
Qty: 360 TAB | Refills: 1 | Status: SHIPPED | OUTPATIENT
Start: 2019-08-01 | End: 2020-01-23 | Stop reason: SDUPTHER

## 2019-08-01 NOTE — TELEPHONE ENCOUNTER
Patient is calling checking the status of his refill. Pt. Genny Bo he is completely out of his Metformin and needs a refill ASAP.      Best call#[phone]

## 2019-08-15 DIAGNOSIS — E11.9 CONTROLLED TYPE 2 DIABETES MELLITUS WITHOUT COMPLICATION, WITHOUT LONG-TERM CURRENT USE OF INSULIN (HCC): ICD-10-CM

## 2019-08-15 NOTE — TELEPHONE ENCOUNTER
Pharmacy requesting medication refill 90 day supply     Requested Prescriptions     Pending Prescriptions Disp Refills    pravastatin (PRAVACHOL) 20 mg tablet 90 Tab 2     Sig: Take 1 Tab by mouth daily.      Pharmacy on file verified  ([facility])

## 2019-08-16 RX ORDER — PRAVASTATIN SODIUM 20 MG/1
20 TABLET ORAL DAILY
Qty: 90 TAB | Refills: 1 | Status: SHIPPED | OUTPATIENT
Start: 2019-08-16 | End: 2019-12-14 | Stop reason: SDUPTHER

## 2019-09-12 ENCOUNTER — OFFICE VISIT (OUTPATIENT)
Dept: FAMILY MEDICINE CLINIC | Age: 69
End: 2019-09-12

## 2019-09-12 VITALS
TEMPERATURE: 98.3 F | WEIGHT: 192.2 LBS | RESPIRATION RATE: 18 BRPM | BODY MASS INDEX: 27.52 KG/M2 | SYSTOLIC BLOOD PRESSURE: 150 MMHG | OXYGEN SATURATION: 98 % | HEIGHT: 70 IN | DIASTOLIC BLOOD PRESSURE: 94 MMHG | HEART RATE: 67 BPM

## 2019-09-12 DIAGNOSIS — Z23 ENCOUNTER FOR IMMUNIZATION: ICD-10-CM

## 2019-09-12 DIAGNOSIS — E11.9 CONTROLLED TYPE 2 DIABETES MELLITUS WITHOUT COMPLICATION, WITHOUT LONG-TERM CURRENT USE OF INSULIN (HCC): Primary | ICD-10-CM

## 2019-09-12 DIAGNOSIS — I10 HTN, GOAL BELOW 140/90: ICD-10-CM

## 2019-09-12 LAB — HBA1C MFR BLD HPLC: 6.7 %

## 2019-09-12 NOTE — PATIENT INSTRUCTIONS

## 2019-09-12 NOTE — PROGRESS NOTES
Chief Complaint   Patient presents with    Diabetes     follow up     1. Have you been to the ER, urgent care clinic since your last visit? Hospitalized since your last visit? No    2. Have you seen or consulted any other health care providers outside of the [address] [place] [address] since your last visit? Include any pap smears or colon screening.  No

## 2019-09-12 NOTE — PROGRESS NOTES
Patient Name: Kavitha Mccollum   MRN: [id number]    Kostas Meek is a 70 y.o. male who presents with the following:     He is seen for diabetes and hypertension. Since last visit he reports no significant changes. Home glucose monitoring: fasting values range 170s. He reports medication compliance: compliant all of the time. Medication side effects: none. Diabetic diet compliance: eats late night snacks. Lab review: A1c today is 6.7. Eye exam: UTD. Will be undergoing gamma knife for acoustic neuroma. Lab Results   Component Value Date/Time    Hemoglobin A1c 6.8 (H) 12/26/2018 07:34 AM    Hemoglobin A1c (POC) 6.7 09/12/2019 02:36 PM     BP Readings from Last 3 Encounters:   09/12/19 (!) 150/94   06/11/19 136/88   03/15/19 140/80     Review of Systems   Constitutional: Negative for fever, malaise/fatigue and weight loss. Respiratory: Negative for cough, hemoptysis, shortness of breath and wheezing. Cardiovascular: Negative for chest pain, palpitations, leg swelling and PND. Gastrointestinal: Negative for abdominal pain, constipation, diarrhea, nausea and vomiting. The patient's medications, allergies, past medical history, surgical history, family history and social history were reviewed and updated where appropriate. Prior to Admission medications    Medication Sig Start Date End Date Taking? Authorizing Provider   pravastatin (PRAVACHOL) 20 mg tablet Take 1 Tab by mouth daily. 8/16/19  Yes Pa Hansen NP   metFORMIN (GLUCOPHAGE) 500 mg tablet TAKE 2 TABLETS TWICE A DAY WITH MEALS 8/1/19  Yes Bernette Oppenheim, MD   glucose blood VI test strips (ONETOUCH ULTRA TEST) strip Use to check blood sugar once daily. DM E11.9 4/5/19  Yes Chela Dallas NP   SITagliptin (JANUVIA) 100 mg tablet TAKE 1 TABLET DAILY 3/15/19  Yes Lore Estrada MD   [facility]) 20 mg tablet Take 1 Tab by mouth daily.  3/15/19  Yes Lore Estrada MD   aspirin delayed-release 81 mg tablet Take 81 mg by mouth daily. Yes Provider, Historical   multivitamin (ONE A DAY) tablet Take 1 Tab by mouth daily. Yes Provider, Historical       Allergies   Allergen Reactions    Iodine Nausea Only     Shrimp           OBJECTIVE    Visit Vitals  BP (!) 150/94 (BP 1 Location: Left arm, BP Patient Position: Sitting)   Pulse 67   Temp 98.3 °F (36.8 °C) (Oral)   Resp 18   Ht 5' 9.75\" (1.772 m)   Wt 192 lb 3.2 oz (87.2 kg)   SpO2 98%   BMI 27.78 kg/m²       Physical Exam   Constitutional: He is oriented to person, place, and time and well-developed, well-nourished, and in no distress. No distress. Eyes: Pupils are equal, round, and reactive to light. Conjunctivae and EOM are normal.   Cardiovascular: Normal rate, regular rhythm and normal heart sounds. Exam reveals no gallop and no friction rub. No murmur heard. Pulmonary/Chest: Effort normal and breath sounds normal. No respiratory distress. He has no wheezes. Neurological: He is alert and oriented to person, place, and time. Skin: Skin is warm and dry. No rash noted. He is not diaphoretic. Psychiatric: Mood, memory, affect and judgment normal.   Nursing note and vitals reviewed. ASSESSMENT AND PLAN  Darleen Ibarra is a 70 y.o. male who presents today for:    1. Controlled type 2 diabetes mellitus without complication, without long-term current use of insulin (HCC)  Stable, continue current treatment pending review of labs. - AMB POC HEMOGLOBIN A1C    2. HTN, goal below 140/90  Mildly elevated today. Continue same meds. 3. Encounter for immunization  - INFLUENZA VACCINE INACTIVATED (IIV), SUBUNIT, ADJUVANTED, IM  - ADMIN INFLUENZA VIRUS VAC       There are no discontinued medications. Follow-up and Dispositions    · Return in about 6 months (around 3/12/2020) for DM follow up. Medication risks/benefits/costs/interactions/alternatives discussed with patient.   Advised patient to call back or return to office if symptoms worsen/change/persist. If patient cannot reach us or should anything more severe/urgent arise he/she should proceed directly to the nearest emergency department. Discussed expected course/resolution/complications of diagnosis in detail with patient. Patient given a written after visit summary which includes his/her diagnoses, current medications and vitals. Patient expressed understanding with the diagnosis and plan. Rom Melendez M.D.

## 2019-09-13 ENCOUNTER — TELEPHONE (OUTPATIENT)
Dept: FAMILY MEDICINE CLINIC | Age: 69
End: 2019-09-13

## 2019-09-13 NOTE — TELEPHONE ENCOUNTER
----- Message from Virginie Cheema sent at 9/13/2019  4:53 PM EDT -----  Regarding: Dr. Janell Phelps / Telephone  Pt stated Gayatri Berrios ([phone]), is requesting for the office to call over to them to give the fax number regarding Rx \"Benicar (blood pressure med) and Pravastatin sodium tabs (blood pressure med)\". They do not want the pt to do so.   Fax: pt unsure   Pt's best contact: [phone]

## 2019-09-16 NOTE — TELEPHONE ENCOUNTER
Outbound call to [facility]. Updated patients PCP and fax number to the office. [facility] representative has verbalized understanding.

## 2019-10-11 ENCOUNTER — OFFICE VISIT (OUTPATIENT)
Dept: FAMILY MEDICINE CLINIC | Age: 69
End: 2019-10-11

## 2019-10-11 VITALS
WEIGHT: 188.2 LBS | TEMPERATURE: 98.2 F | DIASTOLIC BLOOD PRESSURE: 68 MMHG | SYSTOLIC BLOOD PRESSURE: 134 MMHG | HEIGHT: 70 IN | BODY MASS INDEX: 26.94 KG/M2 | HEART RATE: 88 BPM | RESPIRATION RATE: 16 BRPM | OXYGEN SATURATION: 97 %

## 2019-10-11 DIAGNOSIS — R52 GENERALIZED BODY ACHES: Primary | ICD-10-CM

## 2019-10-11 DIAGNOSIS — R53.83 FEELING TIRED: ICD-10-CM

## 2019-10-11 DIAGNOSIS — G44.209 TENSION-TYPE HEADACHE, NOT INTRACTABLE, UNSPECIFIED CHRONICITY PATTERN: ICD-10-CM

## 2019-10-11 DIAGNOSIS — Z86.018 HISTORY OF ACOUSTIC NEUROMA: ICD-10-CM

## 2019-10-11 DIAGNOSIS — Z56.6 WORK STRESS: ICD-10-CM

## 2019-10-11 SDOH — HEALTH STABILITY - MENTAL HEALTH: OTHER PHYSICAL AND MENTAL STRAIN RELATED TO WORK: Z56.6

## 2019-10-11 NOTE — PATIENT INSTRUCTIONS
Learning About Mindfulness for Stress What are mindfulness and stress? Stress is what you feel when you have to handle more than you are used to. A lot of things can cause stress. You may feel stress when you go on a job interview, take a test, or run a race. This kind of short-term stress is normal and even useful. It can help you if you need to work hard or react quickly. Stress also can last a long time. Long-term stress is caused by stressful situations or events. Examples of long-term stress include long-term health problems, ongoing problems at work, and conflicts in your family. Long-term stress can harm your health. Mindfulness is a focus only on things happening in the present moment. It's a process of purposefully paying attention to and being aware of your surroundings, your emotions, your thoughts, and how your body feels. You are aware of these things, but you aren't judging these experiences as \"good\" or \"bad. \" Mindfulness can help you learn to calm your mind and body to help you cope with illness, pain, and stress. How does mindfulness help to relieve stress? Mindfulness can help quiet your mind and relax your body. Studies show that it can help some people sleep better, feel less anxious, and bring their blood pressure down. And it's been shown to help some people live and cope better with certain health problems like heart disease, depression, chronic pain, and cancer. How do you practice mindfulness? To be mindful is to pay attention, to be present, and to be accepting. · When you're mindful, you do just one thing and you pay close attention to that one thing. For example, you may sit quietly and notice your emotions or how your food tastes and smells. · When you're present, you focus on the things that are happening right now. You let go of your thoughts about the past and the future.  When you dwell on the past or the future, you miss moments that can heal and strengthen you. You may miss moments like hearing a child laugh or seeing a friendly face when you think you're all alone. · When you're accepting, you don't  the present moment. Instead you accept your thoughts and feelings as they come. You can practice anytime, anywhere, and in any way you choose. You can practice in many ways. Here are a few ideas: · While doing your chores, like washing the dishes, let your mind focus on what's in your hand. What does the dish feel like? Is the water warm or cold? · Go outside and take a few deep breaths. What is the air like? Is it warm or cold? · When you can, take some time at the start of your day to sit alone and think. · Take a slow walk by yourself. Count your steps while you breathe in and out. · Try yoga breathing exercises, stretches, and poses to strengthen and relax your muscles. · At work, if you can, try to stop for a few moments each hour. Note how your body feels. Let yourself regroup and let your mind settle before you return to what you were doing. · If you struggle with anxiety or \"worry thoughts,\" imagine your mind as a blue malissa and your worry thoughts as clouds. Now imagine those worry thoughts floating across your mind's malissa. Just let them pass by as you watch. Follow-up care is a key part of your treatment and safety. Be sure to make and go to all appointments, and call your doctor if you are having problems. It's also a good idea to know your test results and keep a list of the medicines you take. Where can you learn more? Go to http://michelle-sherman.info/. Enter S704 in the search box to learn more about \"Learning About Mindfulness for Stress. \" Current as of: April 7, 2019 Content Version: 12.2 © 1979-3085 [employer]. Care instructions adapted under license by [employer] (which disclaims liability or warranty for this information).  If you have questions about a medical condition or this instruction, always ask your healthcare professional. [place] [address] any warranty or liability for your use of this information. Tension Headache: Care Instructions Your Care Instructions Most headaches are tension headaches. These headaches tend to happen again, especially if you are under stress. A tension headache may cause pain or a feeling of pressure all over your head. You probably can't pinpoint the center of the pain. If you keep getting tension headaches, the best thing you can do to limit them is to find out what is causing them and then make changes in those areas. Follow-up care is a key part of your treatment and safety. Be sure to make and go to all appointments, and call your doctor if you are having problems. It's also a good idea to know your test results and keep a list of the medicines you take. How can you care for yourself at home? · Rest in a quiet, dark room with a cool cloth on your forehead until your headache is gone. Close your eyes, and try to relax or go to sleep. Don't watch TV or read. Avoid using the computer. · Use a warm, moist towel or a heating pad set on low to relax tight shoulder and neck muscles. · Have someone gently massage your neck and shoulders. · Take pain medicines exactly as directed. ? If the doctor gave you a prescription medicine for pain, take it as prescribed. ? If you are not taking a prescription pain medicine, ask your doctor if you can take an over-the-counter medicine. · Be careful not to take pain medicine more often than the instructions allow, because you may get worse or more frequent headaches when the medicine wears off. · If you get another tension headache, stop what you are doing and sit quietly for a moment. Close your eyes and breathe slowly. Try to relax your head and neck muscles.  
· Do not ignore new symptoms that occur with a headache, such as fever, weakness or numbness, vision changes, or confusion. These may be signs of a more serious problem. To help prevent headaches · Keep a headache diary so you can figure out what triggers your headaches. Avoiding triggers may help you prevent headaches. Record when each headache began, how long it lasted, and what the pain was like (throbbing, aching, stabbing, or dull). List anything that may have triggered the headache, such as being physically or emotionally stressed or being anxious or depressed. Other possible triggers are hunger, anger, fatigue, poor posture, and muscle strain. · Find healthy ways to deal with stress. Headaches are most common during or right after stressful times. Take time to relax before and after you do something that has caused a headache in the past. 
· Exercise daily to relieve stress. Relaxation exercises may help reduce tension. · Get plenty of sleep. · Eat regularly and well. Long periods without food can trigger a headache. · Treat yourself to a massage. Some people find that massages are very helpful in relieving tension. · Try to keep your muscles relaxed by keeping good posture. Check your jaw, face, neck, and shoulder muscles for tension, and try to relax them. When sitting at a desk, change positions often, and stretch for 30 seconds each hour. · Reduce eyestrain from computers by blinking frequently and looking away from the computer screen every so often. Make sure you have proper eyewear and that your monitor is set up properly, about an arm's length away. When should you call for help? Call 911 anytime you think you may need emergency care. For example, call if: 
  · You have signs of a stroke. These may include: 
? Sudden numbness, paralysis, or weakness in your face, arm, or leg, especially on only one side of your body. ? Sudden vision changes. ? Sudden trouble speaking. ? Sudden confusion or trouble understanding simple statements. ? Sudden problems with walking or balance. ? A sudden, severe headache that is different from past headaches.  
 Call your doctor now or seek immediate medical care if: 
  · You have new or worse nausea and vomiting.  
  · You have a new or higher fever.  
  · Your headache gets much worse.  
 Watch closely for changes in your health, and be sure to contact your doctor if: 
  · You are not getting better after 2 days (48 hours). Where can you learn more? Go to http://michelle-sherman.info/. Enter 84 17 [id number] in the search box to learn more about \"Tension Headache: Care Instructions. \" Current as of: March 28, 2019 Content Version: 12.2 © 5751-9551 [employer]. Care instructions adapted under license by [employer] (which disclaims liability or warranty for this information). If you have questions about a medical condition or this instruction, always ask your healthcare professional. [address] any warranty or liability for your use of this information.

## 2019-10-11 NOTE — PROGRESS NOTES
Chief Complaint   Patient presents with    Generalized Body Aches     x 4 days     Headache     x 4 days     Fatigue     x 4 days      1. Have you been to the ER, urgent care clinic since your last visit? Hospitalized since your last visit? No    2. Have you seen or consulted any other health care providers outside of the [address] [place] [address] since your last visit? Include any pap smears or colon screening.  No

## 2019-10-11 NOTE — PROGRESS NOTES
[facility] Note      Subjective:     Chief Complaint   Patient presents with    Generalized Body Aches     x 4 days     Headache     x 4 days     Fatigue     x 4 days      Concetta Morfin is a 78y.o. year old male who presents for evaluation of the following: Body Aches: Onset 4 days ago  Location: muscle aching in back  Associated frontal headache with tightness in the back of head. Now resolved  - Feeling tired but not overwhelming feeling. Now resolved  Gamma knife procedure for acoustic neuroma and then prednisone for 6 days- last pill taken 1 week ago  Endorses recent stress at work, ,,and son with a cold  Denies fever, coughing, sneezing  Planning to transfer an elderly person and road trip tomorrow. 3 most recent PHQ Screens 10/11/2019   Little interest or pleasure in doing things Not at all   Feeling down, depressed, irritable, or hopeless Not at all   Total Score PHQ 2 0   '      Review of Systems   Pertinent positives and negative per HPI. All other systems  reviewed are negative for a Comprehensive ROS (10+).        Past Medical History:   Diagnosis Date    Acoustic neuroma ([facility] [address] 05/28/2018    left ear    Blastomycosis 1990    (treated with Sporanox) -Left Lung    Diabetes mellitus type 2, controlled, without complications ([facility] [address] 33/90/3215    History of colonic polyps     HTN, goal below 140/90 6/11/2019        Social History     Socioeconomic History    Marital status:      Spouse name: Not on file    Number of children: Not on file    Years of education: Not on file    Highest education level: Not on file   Occupational History    Occupation: defense      Employer: [employer]   Social Needs    Financial resource strain: Not on file    Food insecurity:     Worry: Not on file     Inability: Not on file    Transportation needs:     Medical: Not on file     Non-medical: Not on file   Tobacco Use    Smoking status: Never Smoker    Smokeless tobacco: Never Used   Substance and Sexual Activity    Alcohol use: Yes     Alcohol/week: 0.0 standard drinks     Comment: rare to occasional    Drug use: No    Sexual activity: Yes     Partners: Female   Lifestyle    Physical activity:     Days per week: Not on file     Minutes per session: Not on file    Stress: Not on file   Relationships    Social connections:     Talks on phone: Not on file     Gets together: Not on file     Attends [employer] service: Not on file     Active member of club or organization: Not on file     Attends meetings of clubs or organizations: Not on file     Relationship status: Not on file    Intimate partner violence:     Fear of current or ex partner: Not on file     Emotionally abused: Not on file     Physically abused: Not on file     Forced sexual activity: Not on file   Other Topics Concern    Not on file   Social History Narrative    Not on file       Family History   Problem Relation Age of Onset    Cancer Father         prostate    Diabetes Father         adult-onset    Stroke Father     Dementia Mother     Psychiatric Disorder Mother     Diabetes Mother        Current Outpatient Medications   Medication Sig    pravastatin (PRAVACHOL) 20 mg tablet Take 1 Tab by mouth daily.  metFORMIN (GLUCOPHAGE) 500 mg tablet TAKE 2 TABLETS TWICE A DAY WITH MEALS    glucose blood VI test strips (ONETOUCH ULTRA TEST) strip Use to check blood sugar once daily. DM E11.9    SITagliptin (JANUVIA) 100 mg tablet TAKE 1 TABLET DAILY    olmesartan (BENICAR) 20 mg tablet Take 1 Tab by mouth daily.  aspirin delayed-release 81 mg tablet Take 81 mg by mouth daily.  multivitamin (ONE A DAY) tablet Take 1 Tab by mouth daily. No current facility-administered medications for this visit.               Objective:     Vitals:    10/11/19 1108   BP: 134/68   Pulse: 88   Resp: 16   Temp: 98.2 °F (36.8 °C)   TempSrc: Oral   SpO2: 97%   Weight: 188 lb 3.2 oz (85.4 kg)   Height: 5' 9.75\" (1.772 m)       Physical Examination:  General: Alert, cooperative, no distress, appears stated age. Eyes: Conjunctivae clear. PERRL, EOMs intact. Ears: Normal external ear canals both ears. TM clear and mobile bilaterally   Nose: Nares normal. Septum midline. Mucosa normal. No drainage or sinus tenderness. Mouth/Throat: Lips, mucosa, and tongue normal. No oropharyngeal erythema. No tonsillar enlargement or exudate. Neck: Supple, symmetrical, trachea midline, no adenopathy. No thyroid enlargement/tenderness/nodules  Respiratory: Breathing comfortably, in no acute respiratory distress. Clear to auscultation bilaterally. Normal inspiratory and expiratory ratio. Cardiovascular: Regular rate and rhythm, S1, S2 normal, no murmur, click, rub or gallop.   -Extremities no edema. Pulses 2+ and symmetric radial  Abdomen: Soft, non-tender, not distended. Bowel sounds normal. No masses or organomegaly. MSK: Extremities normal, atraumatic, no effusion. Gait steady and unassisted. Back symmetric, no curvature. ROM normal. Back non tender  Skin: Skin color, texture, turgor normal. No rashes or lesions on exposed skin. Lymph nodes: Cervical, supraclavicular nodes normal.  Neurologic: CNII-XII intact. Psychiatric: Affect appropriate      Office Visit on 09/12/2019   Component Date Value Ref Range Status    Hemoglobin A1c (POC) 09/12/2019 6.7  % Final           Assessment/ Plan:   Diagnoses and all orders for this visit:    1. Generalized body aches    2. Feeling tired    3. History of acoustic neuroma    4. Tension-type headache, not intractable, unspecified chronicity pattern        Nonspecific body ache, headache and feeling tired, now all resolved. May be developing URI. No SIRS. Monitor for symptoms progression. Encouraged deep breathing or stress management. Educated patient on red flag symptoms to warrant return to clinic or emergency room visit.     I have discussed the diagnosis with the patient and the intended plan as seen in the above orders. The patient has been offered or received an after-visit summary and questions were answered concerning future plans. I have discussed medication side effects and warnings with the patient as well. Follow-up and Dispositions    · Return if symptoms worsen or fail to improve.          Signed,    Arnold Glass MD  10/11/2019

## 2020-01-29 ENCOUNTER — OFFICE VISIT (OUTPATIENT)
Dept: FAMILY MEDICINE CLINIC | Age: 70
End: 2020-01-29

## 2020-01-29 VITALS
TEMPERATURE: 98.1 F | HEIGHT: 70 IN | WEIGHT: 186.4 LBS | SYSTOLIC BLOOD PRESSURE: 134 MMHG | BODY MASS INDEX: 26.69 KG/M2 | DIASTOLIC BLOOD PRESSURE: 77 MMHG | RESPIRATION RATE: 17 BRPM | HEART RATE: 75 BPM | OXYGEN SATURATION: 98 %

## 2020-01-29 DIAGNOSIS — J06.9 VIRAL URI WITH COUGH: Primary | ICD-10-CM

## 2020-01-29 DIAGNOSIS — J31.0 RHINOSINUSITIS: ICD-10-CM

## 2020-01-29 DIAGNOSIS — J32.9 RHINOSINUSITIS: ICD-10-CM

## 2020-01-29 RX ORDER — AMOXICILLIN AND CLAVULANATE POTASSIUM 500; 125 MG/1; MG/1
1 TABLET, FILM COATED ORAL 2 TIMES DAILY
Qty: 14 TAB | Refills: 0 | Status: SHIPPED | OUTPATIENT
Start: 2020-01-29 | End: 2020-02-05

## 2020-01-29 NOTE — PROGRESS NOTES
Chief Complaint   Patient presents with    Cough     x 4-5 days      1. Have you been to the ER, urgent care clinic since your last visit? Hospitalized since your last visit? No    2. Have you seen or consulted any other health care providers outside of the [address] [place] [address] since your last visit? Include any pap smears or colon screening.  No

## 2020-01-29 NOTE — PROGRESS NOTES
[facility] Note      Subjective:     Chief Complaint   Patient presents with    Cough     x 4-5 days      Dominick Sicard is a 78y.o. year old male who presents for evaluation of the following:      Upper Respiratory Symptoms:  Onset 4-5 days  Current Symptoms: Scratchy dry cough  Treatment: otc cough medicines  Denies fever, chills, hemoptysis, chest pain, shortness of breath, vomiting, diarrhea, rash, sinus pressure, headache, body aches    Review of Systems   Pertinent positives and negative per HPI. All other systems  reviewed are negative for a Comprehensive ROS (10+). Past Medical History:   Diagnosis Date    Acoustic neuroma ([facility] 05/28/2018    left ear    Blastomycosis 1990    (treated with Sporanox) -Left Lung    Diabetes mellitus type 2, controlled, without complications ([facility] 98/73/7957    History of colonic polyps     HTN, goal below 140/90 6/11/2019        Social History     Socioeconomic History    Marital status:      Spouse name: Not on file    Number of children: Not on file    Years of education: Not on file    Highest education level: Not on file   Occupational History    Occupation: defense      Employer: [employer]   Social Needs    Financial resource strain: Not on file    Food insecurity:     Worry: Not on file     Inability: Not on file    Transportation needs:     Medical: Not on file     Non-medical: Not on file   Tobacco Use    Smoking status: Never Smoker    Smokeless tobacco: Never Used   Substance and Sexual Activity    Alcohol use:  Yes     Alcohol/week: 0.0 standard drinks     Comment: rare to occasional    Drug use: No    Sexual activity: Yes     Partners: Female   Lifestyle    Physical activity:     Days per week: Not on file     Minutes per session: Not on file    Stress: Not on file   Relationships    Social connections:     Talks on phone: Not on file     Gets together: Not on file     Attends Anabaptism service: Not on file     Active member of club or organization: Not on file     Attends meetings of clubs or organizations: Not on file     Relationship status: Not on file    Intimate partner violence:     Fear of current or ex partner: Not on file     Emotionally abused: Not on file     Physically abused: Not on file     Forced sexual activity: Not on file   Other Topics Concern    Not on file   Social History Narrative    Not on file       Family History   Problem Relation Age of Onset    Cancer Father         prostate    Diabetes Father         adult-onset    Stroke Father     Dementia Mother     Psychiatric Disorder Mother     Diabetes Mother        Current Outpatient Medications   Medication Sig    metFORMIN (GLUCOPHAGE) 500 mg tablet TAKE 2 TABLETS TWICE A DAY WITH MEALS    glucose blood VI test strips (ONETOUCH ULTRA TEST) strip Use to check blood sugar once daily. DM E11.9    olmesartan (BENICAR) 20 mg tablet Take 1 Tab by mouth daily.  pravastatin (PRAVACHOL) 20 mg tablet TAKE 1 TABLET DAILY    SITagliptin (JANUVIA) 100 mg tablet TAKE 1 TABLET DAILY    aspirin delayed-release 81 mg tablet Take 81 mg by mouth daily.  multivitamin (ONE A DAY) tablet Take 1 Tab by mouth daily. No current facility-administered medications for this visit. Objective:     Vitals:    01/29/20 1654   BP: 134/77   Pulse: 75   Resp: 17   Temp: 98.1 °F (36.7 °C)   TempSrc: Oral   SpO2: 98%   Weight: 186 lb 6.4 oz (84.6 kg)   Height: 5' 9.75\" (1.772 m)       Physical Examination:  General: Alert, cooperative, no distress, appears stated age. Eyes: Conjunctivae clear. Pupils equally round and reactive to light, Extraocular muscles intact. Ears: Normal external ear canals both ears. Tympanic membranes clear and mobile bilaterally   Nose: Nares normal. Septum midline. Mucosa normal. No drainage or sinus tenderness.   - Audible nasal congestion  Mouth/Throat: Lips, mucosa, and tongue normal. No oropharyngeal erythema. No tonsillar enlargement or exudate. Neck: Supple, symmetrical, trachea midline, no adenopathy. No thyroid enlargement/tenderness/nodules  Respiratory: Breathing comfortably, in no acute respiratory distress. Clear to auscultation bilaterally. Normal inspiratory and expiratory ratio. Cardiovascular: Regular rate and rhythm, S1, S2 normal, no murmur, click, rub or gallop.   -Extremities no edema. Abdomen: Soft, non-tender, not distended. Bowel sounds normal.   MSK: Extremities normal appearing, atraumatic, no effusion. Gait steady and unassisted. Skin: Skin color, texture, turgor normal. No rashes or lesions on exposed skin. Lymph nodes: Cervical, supraclavicular nodes normal.  Neurologic: Cranial nerves II-XII intact. Psychiatric: Affect appropriate      No visits with results within 3 Month(s) from this visit. Latest known visit with results is:   Office Visit on 09/12/2019   Component Date Value Ref Range Status    Hemoglobin A1c (POC) 09/12/2019 6.7  % Final           Assessment/ Plan:   Diagnoses and all orders for this visit:    1. Viral URI with cough  -     amoxicillin-clavulanate (AUGMENTIN) 500-125 mg per tablet; Take 1 Tab by mouth two (2) times a day for 7 days. 2. Rhinosinusitis  -     amoxicillin-clavulanate (AUGMENTIN) 500-125 mg per tablet; Take 1 Tab by mouth two (2) times a day for 7 days. Mild URI with cough vs sinusitis. No SIRS. Trial of otc meds for symptom relief discussed and listed in patient instructions- nasal steroid + mucinex + antihistamine + sinus rinse + otc analgesia + humidifier prn  - Delayed prescription for antibiotic to treat prolonged symptoms concerning for bacterial infection. Educated patient on red flag symptoms to warrant return to clinic or emergency room visit. I have discussed the diagnosis with the patient and the intended plan as seen in the above orders.   The patient has been offered or received an after-visit summary and questions were answered concerning future plans. I have discussed medication side effects and warnings with the patient as well. Follow-up and Dispositions    · Return in about 3 months (around 4/29/2020), or if symptoms worsen or fail to improve.        Signed,    Renee Oliveira MD  1/29/2020

## 2020-01-30 NOTE — PATIENT INSTRUCTIONS
For your symptoms: Your symptoms may improve with an oral antihistamine. These are available over the counter and include: 
Loratadine/claritin Cetirizine/Zyrtec Fexofenadine/Allegra Levocetirizine/Xyzal 
 
· Your symptoms may improve with a nasal steroid. These are available over the counter and include: · Flonase (aka fluticasone) · Nasocort (aka triamcinolone) · Nasonex (aka mometasone) · Rhinocort (aka budesonide) · Increase fluid intake, especially water to thin mucous and boost the immune system. · Avoid sugar and dairy while congested since they thicken mucous. · Get plenty of rest!   
· Gargle 3 times daily and as needed in Listerine or warm salt water vinegar solutions (1 tsp salt, 1 tsp vinegar in 1 cup lukewarm water.) · Use OTC nasal saline spray up each nostril four times daily. You could also consider using a netipot with distilled water. · Use humidifier at bedtime. · Use OTC Mucinex 600 mg twice daily to loosen mucous. · Use OTC Tylenol  (up to 650mg every 6 hours) or Ibuprofen (up to 800 mg every 8 hours) as needed for pain, fever or headaches. ·  Avoid decongestants and Ibuprofen if you have high blood pressure! Return to the doctor for evaluation: · If mucous is consistently discolored yellow or green throughout the day for more than a week · If you develop worsening facial pain · If you develop a fever that will not go away · If your symptoms worsen instead of improve Saline Nasal Washes: Care Instructions Your Care Instructions Saline nasal washes help keep the nasal passages open by washing out thick or dried mucus. This simple remedy can help relieve symptoms of allergies, sinusitis, and colds. It also can make the nose feel more comfortable by keeping the mucous membranes moist. You may notice a little burning sensation in your nose the first few times you use the solution, but this usually gets better in a few days. Follow-up care is a key part of your treatment and safety. Be sure to make and go to all appointments, and call your doctor if you are having problems. It's also a good idea to know your test results and keep a list of the medicines you take. How can you care for yourself at home? · You can buy premixed saline solution in a squeeze bottle or other sinus rinse products at a drugstore. Read and follow the instructions on the label. · You also can make your own saline solution by adding 1 teaspoon of salt and 1 teaspoon of baking soda to 2 cups of distilled water. · If you use a homemade solution, pour a small amount into a clean bowl. Using a rubber bulb syringe, squeeze the syringe and place the tip in the salt water. Pull a small amount of the salt water into the syringe by relaxing your hand. · Sit down with your head tilted slightly back. Do not lie down. Put the tip of the bulb syringe or the squeeze bottle a little way into one of your nostrils. Gently drip or squirt a few drops into the nostril. Repeat with the other nostril. Some sneezing and gagging are normal at first. 
· Gently blow your nose. · Wipe the syringe or bottle tip clean after each use. · Repeat this 2 or 3 times a day. · Use nasal washes gently if you have nosebleeds often. When should you call for help? Watch closely for changes in your health, and be sure to contact your doctor if: 
  · You often get nosebleeds.  
  · You have problems doing the nasal washes. Where can you learn more? Go to http://michelle-sherman.info/. Enter [id number] in the search box to learn more about \"Saline Nasal Washes: Care Instructions. \" Current as of: October 21, 2018 Content Version: 12.2 © 5721-4056 [employer]. Care instructions adapted under license by [employer] (which disclaims liability or warranty for this information).  If you have questions about a medical condition or this instruction, always ask your healthcare professional. [place] [address] any warranty or liability for your use of this information. Sinusitis: Care Instructions Your Care Instructions Sinusitis is an infection of the lining of the sinus cavities in your head. Sinusitis often follows a cold. It causes pain and pressure in your head and face. In most cases, sinusitis gets better on its own in 1 to 2 weeks. But some mild symptoms may last for several weeks. Sometimes antibiotics are needed. Follow-up care is a key part of your treatment and safety. Be sure to make and go to all appointments, and call your doctor if you are having problems. It's also a good idea to know your test results and keep a list of the medicines you take. How can you care for yourself at home? · Take an over-the-counter pain medicine, such as acetaminophen (Tylenol), ibuprofen (Advil, Motrin), or naproxen (Aleve). Read and follow all instructions on the label. · If the doctor prescribed antibiotics, take them as directed. Do not stop taking them just because you feel better. You need to take the full course of antibiotics. · Be careful when taking over-the-counter cold or flu medicines and Tylenol at the same time. Many of these medicines have acetaminophen, which is Tylenol. Read the labels to make sure that you are not taking more than the recommended dose. Too much acetaminophen (Tylenol) can be harmful. · Breathe warm, moist air from a steamy shower, a hot bath, or a sink filled with hot water. Avoid cold, dry air. Using a humidifier in your home may help. Follow the directions for cleaning the machine. · Use saline (saltwater) nasal washes to help keep your nasal passages open and wash out mucus and bacteria. You can buy saline nose drops at a grocery store or drugstore.  Or you can make your own at home by adding 1 teaspoon of salt and 1 teaspoon of baking soda to 2 cups of distilled water. If you make your own, fill a bulb syringe with the solution, insert the tip into your nostril, and squeeze gently. [address] [place] your nose. · Put a hot, wet towel or a warm gel pack on your face 3 or 4 times a day for 5 to 10 minutes each time. · Try a decongestant nasal spray like oxymetazoline (Afrin). Do not use it for more than 3 days in a row. Using it for more than 3 days can make your congestion worse. When should you call for help? Call your doctor now or seek immediate medical care if: 
  · You have new or worse swelling or redness in your face or around your eyes.  
  · You have a new or higher fever.  
 Watch closely for changes in your health, and be sure to contact your doctor if: 
  · You have new or worse facial pain.  
  · The mucus from your nose becomes thicker (like pus) or has new blood in it.  
  · You are not getting better as expected. Where can you learn more? Go to http://michelle-sherman.info/. Enter D513 in the search box to learn more about \"Sinusitis: Care Instructions. \" Current as of: October 21, 2018 Content Version: 12.2 © 7078-7547 [employer]. Care instructions adapted under license by [employer] (which disclaims liability or warranty for this information). If you have questions about a medical condition or this instruction, always ask your healthcare professional. [place] [address] any warranty or liability for your use of this information.

## 2020-02-10 ENCOUNTER — HOSPITAL ENCOUNTER (OUTPATIENT)
Dept: GENERAL RADIOLOGY | Age: 70
Discharge: HOME | End: 2020-02-10
Payer: MEDICARE

## 2020-02-10 ENCOUNTER — OFFICE VISIT (OUTPATIENT)
Dept: FAMILY MEDICINE CLINIC | Age: 70
End: 2020-02-10

## 2020-02-10 VITALS
BODY MASS INDEX: 27.52 KG/M2 | TEMPERATURE: 98.3 F | RESPIRATION RATE: 17 BRPM | WEIGHT: 185.8 LBS | DIASTOLIC BLOOD PRESSURE: 91 MMHG | OXYGEN SATURATION: 97 % | SYSTOLIC BLOOD PRESSURE: 147 MMHG | HEIGHT: 69 IN | HEART RATE: 75 BPM

## 2020-02-10 DIAGNOSIS — I10 HTN, GOAL BELOW 140/90: ICD-10-CM

## 2020-02-10 DIAGNOSIS — J06.9 VIRAL URI WITH COUGH: Primary | ICD-10-CM

## 2020-02-10 DIAGNOSIS — M54.2 NECK PAIN: ICD-10-CM

## 2020-02-10 DIAGNOSIS — J06.9 VIRAL URI WITH COUGH: ICD-10-CM

## 2020-02-10 PROCEDURE — 71046 X-RAY EXAM CHEST 2 VIEWS: CPT

## 2020-02-10 RX ORDER — BENZONATATE 100 MG/1
100-200 CAPSULE ORAL
Qty: 30 CAP | Refills: 0 | Status: SHIPPED | OUTPATIENT
Start: 2020-02-10 | End: 2020-02-17

## 2020-02-10 NOTE — PROGRESS NOTES
Chief Complaint   Patient presents with    Cough     at night    Neck Pain     Stiff neck     1. Have you been to the ER, urgent care clinic since your last visit? Hospitalized since your last visit? No    2. Have you seen or consulted any other health care providers outside of the [address] [place] [address] since your last visit? Include any pap smears or colon screening.  No

## 2020-02-10 NOTE — PROGRESS NOTES
[facility] Note      Subjective:     Chief Complaint   Patient presents with    Cough     at night    Neck Pain     Stiff neck     Lexi Hawkins is a 78y.o. year old male who presents for evaluation of the following:      Upper Respiratory Symptoms:  Per previous visit 1/29/2020  Onset 4-5 days  Current Symptoms: Scratchy dry cough  Treatment: otc cough medicines  Denies fever, chills, hemoptysis, chest pain, shortness of breath, vomiting, diarrhea, rash, sinus pressure, headache, body aches    Interval Update:  Cough persists, worse at night  Completed Augmentin with some improvement  Has since developed bilateral neck pain improved with ibuprofen and heating pad  - has improved over time  Denies headache, fever, injury      HTN:    Tx: olmesartan daily  BP Readings from Last 3 Encounters:   02/10/20 (!) 147/91   01/29/20 134/77   10/11/19 134/68       Review of Systems   Pertinent positives and negative per HPI. All other systems  reviewed are negative for a Comprehensive ROS (10+).        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    Blastomycosis 1990    (treated with Sporanox) -Left Lung    Diabetes mellitus type 2, controlled, without complications ([facility] 94/53/2995    History of colonic polyps     HTN, goal below 140/90 6/11/2019        Social History     Socioeconomic History    Marital status:      Spouse name: Not on file    Number of children: Not on file    Years of education: Not on file    Highest education level: Not on file   Occupational History    Occupation: defense      Employer: [employer]   Social Needs    Financial resource strain: Not on file    Food insecurity:     Worry: Not on file     Inability: Not on file    Transportation needs:     Medical: Not on file     Non-medical: Not on file   Tobacco Use    Smoking status: Never Smoker    Smokeless tobacco: Never Used   Substance and Sexual Activity    Alcohol use: Yes     Alcohol/week: 0.0 standard drinks     Comment: rare to occasional    Drug use: No    Sexual activity: Yes     Partners: Female   Lifestyle    Physical activity:     Days per week: Not on file     Minutes per session: Not on file    Stress: Not on file   Relationships    Social connections:     Talks on phone: Not on file     Gets together: Not on file     Attends Mosque service: Not on file     Active member of club or organization: Not on file     Attends meetings of clubs or organizations: Not on file     Relationship status: Not on file    Intimate partner violence:     Fear of current or ex partner: Not on file     Emotionally abused: Not on file     Physically abused: Not on file     Forced sexual activity: Not on file   Other Topics Concern    Not on file   Social History Narrative    Not on file       Family History   Problem Relation Age of Onset    Cancer Father         prostate    Diabetes Father         adult-onset    Stroke Father     Dementia Mother     Psychiatric Disorder Mother     Diabetes Mother        Current Outpatient Medications   Medication Sig    metFORMIN (GLUCOPHAGE) 500 mg tablet TAKE 2 TABLETS TWICE A DAY WITH MEALS    glucose blood VI test strips (ONETOUCH ULTRA TEST) strip Use to check blood sugar once daily. DM E11.9    olmesartan (BENICAR) 20 mg tablet Take 1 Tab by mouth daily.  pravastatin (PRAVACHOL) 20 mg tablet TAKE 1 TABLET DAILY    SITagliptin (JANUVIA) 100 mg tablet TAKE 1 TABLET DAILY    aspirin delayed-release 81 mg tablet Take 81 mg by mouth daily.  multivitamin (ONE A DAY) tablet Take 1 Tab by mouth daily. No current facility-administered medications for this visit.               Objective:     Vitals:    02/10/20 1427   BP: 155/84   Pulse: 75   Resp: 17   Temp: 98.3 °F (36.8 °C)   TempSrc: Oral   SpO2: 97%   Weight: 185 lb 12.8 oz (84.3 kg)   Height: 5' 9\" (1.753 m)       Physical Examination:  General: Alert, cooperative, no distress, appears stated age. Eyes: Conjunctivae clear. Pupils equally round and reactive to light, Extraocular muscles intact. Ears: Normal external ear canals both ears. Tympanic membranes clear and mobile bilaterally   Nose: Nares normal. Septum midline. Mucosa normal. No drainage or sinus tenderness. - Audible nasal congestion  Mouth/Throat: Lips, mucosa, and tongue normal. No oropharyngeal erythema. No tonsillar enlargement or exudate. Neck: Supple, symmetrical, trachea midline, no adenopathy. No thyroid enlargement/tenderness/nodules  Respiratory: Breathing comfortably, in no acute respiratory distress. RLL wheezing on forced exhalation  Cardiovascular: Regular rate and rhythm, S1, S2 normal, no murmur, click, rub or gallop.   -Extremities no edema. Abdomen: Soft, non-tender, not distended. Bowel sounds normal.   MSK: Extremities normal appearing, atraumatic, no effusion. Gait steady and unassisted. Skin: Skin color, texture, turgor normal. No rashes or lesions on exposed skin  Lymph nodes: Cervical, supraclavicular nodes normal.  Neurologic: Cranial nerves II-XII intact. Psychiatric: Affect appropriate      No visits with results within 3 Month(s) from this visit. Latest known visit with results is:   Office Visit on 09/12/2019   Component Date Value Ref Range Status    Hemoglobin A1c (POC) 09/12/2019 6.7  % Final           Assessment/ Plan:   Diagnoses and all orders for this visit:    1. Viral URI with cough  -     benzonatate (TESSALON) 100 mg capsule; Take 1-2 Caps by mouth three (3) times daily as needed for Cough for up to 7 days. -     XR CHEST PA LAT; Future    2. Neck pain    3. HTN, goal below 140/90      Mild URI with cough vs sinusitis. No SIRS.    Trial of otc meds for symptom relief discussed and listed in patient instructions- nasal steroid + mucinex + antihistamine + sinus rinse + otc analgesia + humidifier prn  -Benzonatate to treat cough.  -Chest x-ray to rule out lung acute pathology given prolonged time course. Neck pain most likely muscle strain versus spasm related to head and neck inflammation. Recommend daily exercises of neck and continue heat compression. If not improved in 2 weeks consider trial of physical therapy. Noted no red flag symptoms of meningitis present. Blood pressure mildly elevated in the setting of cough and subacute illness. Continue current regimen and recheck with PCP in 2 to 4 weeks. Reiterated with patient importance of following up with PCP for chronic disease management. Educated patient on red flag symptoms to warrant return to clinic or emergency room visit. I have discussed the diagnosis with the patient and the intended plan as seen in the above orders. The patient has been offered or received an after-visit summary and questions were answered concerning future plans. I have discussed medication side effects and warnings with the patient as well. Follow-up and Dispositions    · Return in about 4 weeks (around 3/9/2020) for Follow Up with PCP for chronic disease managment.          Signed,    Paramjit Rincon MD  2/10/2020

## 2020-02-10 NOTE — PATIENT INSTRUCTIONS
Neck: Exercises  Introduction  Here are some examples of exercises for you to try. The exercises may be suggested for a condition or for rehabilitation. Start each exercise slowly. Ease off the exercises if you start to have pain. You will be told when to start these exercises and which ones will work best for you. How to do the exercises  Neck stretch    1. This stretch works best if you keep your shoulder down as you lean away from it. To help you remember to do this, start by relaxing your shoulders and lightly holding on to your thighs or your chair. 2. Tilt your head toward your shoulder and hold for 15 to 30 seconds. Let the weight of your head stretch your muscles. 3. If you would like a little added stretch, use your hand to gently and steadily pull your head toward your shoulder. For example, keeping your right shoulder down, lean your head to the left. 4. Repeat 2 to 4 times toward each shoulder. Diagonal neck stretch    1. Turn your head slightly toward the direction you will be stretching, and tilt your head diagonally toward your chest and hold for 15 to 30 seconds. 2. If you would like a little added stretch, use your hand to gently and steadily pull your head forward on the diagonal.  3. Repeat 2 to 4 times toward each side. Dorsal glide stretch    1. Sit or stand tall and look straight ahead. 2. Slowly tuck your chin as you glide your head backward over your body  3. Hold for a count of 6, and then relax for up to 10 seconds. 4. Repeat 8 to 12 times. Chest and shoulder stretch    1. Sit or stand tall and glide your head backward as in the dorsal glide stretch. 2. Raise both arms so that your hands are next to your ears. 3. Take a deep breath, and as you breathe out, lower your elbows down and behind your back. You will feel your shoulder blades slide down and together, and at the same time you will feel a stretch across your chest and the front of your shoulders.   4. Hold for about 6 seconds, and then relax for up to 10 seconds. 5. Repeat 8 to 12 times. Strengthening: Hands on head    1. Move your head backward, forward, and side to side against gentle pressure from your hands, holding each position for about 6 seconds. 2. Repeat 8 to 12 times. Follow-up care is a key part of your treatment and safety. Be sure to make and go to all appointments, and call your doctor if you are having problems. It's also a good idea to know your test results and keep a list of the medicines you take. Where can you learn more? Go to http://michelle-sherman.info/. Enter P975 in the search box to learn more about \"Neck: Exercises. \"  Current as of: June 26, 2019  Content Version: 12.2  © 9710-8923 [employer]. Care instructions adapted under license by [employer] (which disclaims liability or warranty for this information). If you have questions about a medical condition or this instruction, always ask your healthcare professional. [address] any warranty or liability for your use of this information. DASH Diet: Care Instructions  Your Care Instructions    The DASH diet is an eating plan that can help lower your blood pressure. DASH stands for Dietary Approaches to Stop Hypertension. Hypertension is high blood pressure. The DASH diet focuses on eating foods that are high in calcium, potassium, and magnesium. These nutrients can lower blood pressure. The foods that are highest in these nutrients are fruits, vegetables, low-fat dairy products, nuts, seeds, and legumes. But taking calcium, potassium, and magnesium supplements instead of eating foods that are high in those nutrients does not have the same effect. The DASH diet also includes whole grains, fish, and poultry. The DASH diet is one of several lifestyle changes your doctor may recommend to lower your high blood pressure.  Your doctor may also want you to decrease the amount of sodium in your diet. Lowering sodium while following the DASH diet can lower blood pressure even further than just the DASH diet alone. Follow-up care is a key part of your treatment and safety. Be sure to make and go to all appointments, and call your doctor if you are having problems. It's also a good idea to know your test results and keep a list of the medicines you take. How can you care for yourself at home? Following the DASH diet  · Eat 4 to 5 servings of fruit each day. A serving is 1 medium-sized piece of fruit, ½ cup chopped or canned fruit, 1/4 cup dried fruit, or 4 ounces (½ cup) of fruit juice. Choose fruit more often than fruit juice. · Eat 4 to 5 servings of vegetables each day. A serving is 1 cup of lettuce or raw leafy vegetables, ½ cup of chopped or cooked vegetables, or 4 ounces (½ cup) of vegetable juice. Choose vegetables more often than vegetable juice. · Get 2 to 3 servings of low-fat and fat-free dairy each day. A serving is 8 ounces of milk, 1 cup of yogurt, or 1 ½ ounces of cheese. · Eat 6 to 8 servings of grains each day. A serving is 1 slice of bread, 1 ounce of dry cereal, or ½ cup of cooked rice, pasta, or cooked cereal. Try to choose whole-grain products as much as possible. · Limit lean meat, poultry, and fish to 2 servings each day. A serving is 3 ounces, about the size of a deck of cards. · Eat 4 to 5 servings of nuts, seeds, and legumes (cooked dried beans, lentils, and split peas) each week. A serving is 1/3 cup of nuts, 2 tablespoons of seeds, or ½ cup of cooked beans or peas. · Limit fats and oils to 2 to 3 servings each day. A serving is 1 teaspoon of vegetable oil or 2 tablespoons of salad dressing. · Limit sweets and added sugars to 5 servings or less a week. A serving is 1 tablespoon jelly or jam, ½ cup sorbet, or 1 cup of lemonade. · Eat less than 2,300 milligrams (mg) of sodium a day.  If you limit your sodium to 1,500 mg a day, you can lower your blood pressure even more. Tips for success  · Start small. Do not try to make dramatic changes to your diet all at once. You might feel that you are missing out on your favorite foods and then be more likely to not follow the plan. Make small changes, and stick with them. Once those changes become habit, add a few more changes. · Try some of the following:  ? Make it a goal to eat a fruit or vegetable at every meal and at snacks. This will make it easy to get the recommended amount of fruits and vegetables each day. ? Try yogurt topped with fruit and nuts for a snack or healthy dessert. ? Add lettuce, tomato, cucumber, and onion to sandwiches. ? Combine a ready-made pizza crust with low-fat mozzarella cheese and lots of vegetable toppings. Try using tomatoes, squash, spinach, broccoli, carrots, cauliflower, and onions. ? Have a variety of cut-up vegetables with a low-fat dip as an appetizer instead of chips and dip. ? Sprinkle sunflower seeds or chopped almonds over salads. Or try adding chopped walnuts or almonds to cooked vegetables. ? Try some vegetarian meals using beans and peas. Add garbanzo or kidney beans to salads. Make burritos and tacos with mashed sinclair beans or black beans. Where can you learn more? Go to http://michelle-sherman.info/. Enter Q943 in the search box to learn more about \"DASH Diet: Care Instructions. \"  Current as of: April 9, 2019  Content Version: 12.2  © 0394-0510 [employer]. Care instructions adapted under license by [employer] (which disclaims liability or warranty for this information). If you have questions about a medical condition or this instruction, always ask your healthcare professional. [address] any warranty or liability for your use of this information.

## 2020-03-12 ENCOUNTER — OFFICE VISIT (OUTPATIENT)
Dept: FAMILY MEDICINE CLINIC | Age: 70
End: 2020-03-12

## 2020-03-12 VITALS
HEART RATE: 68 BPM | RESPIRATION RATE: 17 BRPM | WEIGHT: 187.4 LBS | SYSTOLIC BLOOD PRESSURE: 140 MMHG | OXYGEN SATURATION: 98 % | DIASTOLIC BLOOD PRESSURE: 80 MMHG | HEIGHT: 69 IN | TEMPERATURE: 98.3 F | BODY MASS INDEX: 27.76 KG/M2

## 2020-03-12 DIAGNOSIS — Z12.5 PROSTATE CANCER SCREENING: ICD-10-CM

## 2020-03-12 DIAGNOSIS — E11.9 CONTROLLED TYPE 2 DIABETES MELLITUS WITHOUT COMPLICATION, WITHOUT LONG-TERM CURRENT USE OF INSULIN (HCC): ICD-10-CM

## 2020-03-12 DIAGNOSIS — I10 HTN, GOAL BELOW 140/90: ICD-10-CM

## 2020-03-12 DIAGNOSIS — D33.3 ACOUSTIC NEUROMA (HCC): ICD-10-CM

## 2020-03-12 DIAGNOSIS — Z00.00 MEDICARE ANNUAL WELLNESS VISIT, SUBSEQUENT: Primary | ICD-10-CM

## 2020-03-12 DIAGNOSIS — E78.5 HYPERLIPIDEMIA, UNSPECIFIED HYPERLIPIDEMIA TYPE: ICD-10-CM

## 2020-03-12 RX ORDER — OMEPRAZOLE 40 MG/1
CAPSULE, DELAYED RELEASE ORAL
COMMUNITY
Start: 2020-02-25 | End: 2020-10-19

## 2020-03-12 RX ORDER — LANCETS
EACH MISCELLANEOUS
Qty: 100 EACH | Refills: 3 | Status: SHIPPED | OUTPATIENT
Start: 2020-03-12 | End: 2020-09-11 | Stop reason: SDUPTHER

## 2020-03-12 NOTE — ASSESSMENT & PLAN NOTE
Stable, based on history, physical exam and review of pertinent labs, studies and medications; meds reconciled; continue current treatment plan. Key Antihyperglycemic Medications             metFORMIN (GLUCOPHAGE) 500 mg tablet (Taking) TAKE 2 TABLETS TWICE A DAY WITH MEALS    SITagliptin (JANUVIA) 100 mg tablet (Taking) TAKE 1 TABLET DAILY        Other Key Diabetic Medications             olmesartan (BENICAR) 20 mg tablet (Taking) Take 1 Tab by mouth daily.     pravastatin (PRAVACHOL) 20 mg tablet (Taking) TAKE 1 TABLET DAILY        Lab Results   Component Value Date/Time    Hemoglobin A1c 6.8 12/26/2018 07:34 AM    Hemoglobin A1c (POC) 6.7 09/12/2019 02:36 PM    Glucose 147 03/15/2019 03:15 PM    Creatinine 0.89 03/15/2019 03:15 PM    Microalb/Creat ratio (ug/mg creat.) <3.0 12/26/2018 07:34 AM    Cholesterol, total 122 12/26/2018 07:34 AM    HDL Cholesterol 40 12/26/2018 07:34 AM    LDL, calculated 67 12/26/2018 07:34 AM    Triglyceride 73 12/26/2018 07:34 AM     Diabetic Foot and Eye Exam HM Status   Topic Date Due    Diabetic Foot Care  01/12/2018    Eye Exam  11/02/2020

## 2020-03-12 NOTE — PATIENT INSTRUCTIONS
Medicare Wellness Visit, Male The best way to live healthy is to have a lifestyle where you eat a well-balanced diet, exercise regularly, limit alcohol use, and quit all forms of tobacco/nicotine, if applicable. Regular preventive services are another way to keep healthy. Preventive services (vaccines, screening tests, monitoring & exams) can help personalize your care plan, which helps you manage your own care. Screening tests can find health problems at the earliest stages, when they are easiest to treat. [facility] follows the current, evidence-based guidelines published by the [facility] Neri Don ([facility]) when recommending preventive services for our patients. Because we follow these guidelines, sometimes recommendations change over time as research supports it. (For example, a prostate screening blood test is no longer routinely recommended for men with no symptoms). Of course, you and your doctor may decide to screen more often for some diseases, based on your risk and co-morbidities (chronic disease you are already diagnosed with). Preventive services for you include: - Medicare offers their members a free annual wellness visit, which is time for you and your primary care provider to discuss and plan for your preventive service needs. Take advantage of this benefit every year! 
-All adults over age 72 should receive the recommended pneumonia vaccines. Current USPSTF guidelines recommend a series of two vaccines for the best pneumonia protection.  
-All adults should have a flu vaccine yearly and tetanus vaccine every 10 years. 
-All adults age 48 and older should receive the shingles vaccines (series of two vaccines). -All adults age 38-68 who are overweight should have a diabetes screening test once every three years. -Other screening tests & preventive services for persons with diabetes include: an eye exam to screen for diabetic retinopathy, a kidney function test, a foot exam, and stricter control over your cholesterol.  
-Cardiovascular screening for adults with routine risk involves an electrocardiogram (ECG) at intervals determined by the provider.  
-Colorectal cancer screening should be done for adults age 54-65 with no increased risk factors for colorectal cancer. There are a number of acceptable methods of screening for this type of cancer. Each test has its own benefits and drawbacks. Discuss with your provider what is most appropriate for you during your annual wellness visit. The different tests include: colonoscopy (considered the best screening method), a fecal occult blood test, a fecal DNA test, and sigmoidoscopy. 
-All adults born between [facility] should be screened once for Hepatitis C. 
-An Abdominal Aortic Aneurysm (AAA) Screening is recommended for men age 73-68 who has ever smoked in their lifetime. Here is a list of your current Health Maintenance items (your personalized list of preventive services) with a due date: 
Health Maintenance Due Topic Date Due  
 Colonoscopy  10/07/1967  Shingles Vaccine (1 of 2) 10/07/1999 Mike Diabetic Foot Care  01/12/2018  Albumin Urine Test  12/26/2019  Cholesterol Test   12/26/2019 Mike Annual Well Visit  03/15/2020

## 2020-03-12 NOTE — PROGRESS NOTES
Patient Name: Saverio Soulier   MRN: [id number]    Sheridan Carmona is a 78 y.o. male who presents with the following:     Colon Cancer Screening: scheduled in May. Hep C: neg   PSA: father with prostate cancer  Lab Results   Component Value Date/Time    Prostate Specific Ag 1.1 03/15/2019 03:15 PM    Prostate Specific Ag 1.2 10/16/2017 07:32 AM    Prostate Specific Ag 0.8 03/03/2012 08:04 AM     CAD risk factors:  HTN: wnl on meds  BP Readings from Last 3 Encounters:   03/12/20 140/80   02/10/20 (!) 147/91   01/29/20 134/77     Lipid: on statin  Lab Results   Component Value Date/Time    Cholesterol, total 122 12/26/2018 07:34 AM    HDL Cholesterol 40 12/26/2018 07:34 AM    LDL, calculated 67 12/26/2018 07:34 AM    VLDL, calculated 15 12/26/2018 07:34 AM    Triglyceride 73 12/26/2018 07:34 AM     DM: on metformin; fasting glucose has been around 170. Tends to snack late night. Lab Results   Component Value Date/Time    Hemoglobin A1c 6.8 (H) 12/26/2018 07:34 AM    Hemoglobin A1c (POC) 6.7 09/12/2019 02:36 PM     Review of Systems   Constitutional: Negative for fever, malaise/fatigue and weight loss. Respiratory: Negative for cough, hemoptysis, shortness of breath and wheezing. Cardiovascular: Negative for chest pain, palpitations, leg swelling and PND. Gastrointestinal: Negative for abdominal pain, constipation, diarrhea, nausea and vomiting. The patient's medications, allergies, past medical history, surgical history, family history and social history were reviewed and updated where appropriate. Prior to Admission medications    Medication Sig Start Date End Date Taking?  Authorizing Provider   omeprazole (PRILOSEC) 40 mg capsule TAKE 1 CAPSULE BY MOUTH 1 HOUR BEFORE DINNER 2/25/20  Yes Provider, Historical   metFORMIN (GLUCOPHAGE) 500 mg tablet TAKE 2 TABLETS TWICE A DAY WITH MEALS 1/23/20  Yes Shabbir Mahoney MD   glucose blood VI test strips (ONETOUCH ULTRA TEST) strip Use to check blood sugar once daily. DM E11.9 1/23/20  Yes Selma Lerma MD   olmesartan (BENICAR) 20 mg tablet Take 1 Tab by mouth daily. 1/23/20  Yes Shelbi Phillips MD   pravastatin (PRAVACHOL) 20 mg tablet TAKE 1 TABLET DAILY 1/23/20  Yes Shelbi Phillips MD   SITagliptin (JANUVIA) 100 mg tablet TAKE 1 TABLET DAILY 1/23/20  Yes Shelbi Phillips MD   aspirin delayed-release 81 mg tablet Take 81 mg by mouth daily. Yes Provider, Historical   multivitamin (ONE A DAY) tablet Take 1 Tab by mouth daily. Yes Provider, Historical       Allergies   Allergen Reactions    Iodine Nausea Only     Shrimp         OBJECTIVE    Visit Vitals  /80   Pulse 68   Temp 98.3 °F (36.8 °C) (Oral)   Resp 17   Ht 5' 9\" (1.753 m)   Wt 187 lb 6.4 oz (85 kg)   SpO2 98%   BMI 27.67 kg/m²       Physical Exam  Constitutional:       General: He is not in acute distress. Appearance: He is not diaphoretic. HENT:      Head: Normocephalic. Right Ear: External ear normal.      Left Ear: External ear normal.   Eyes:      Conjunctiva/sclera: Conjunctivae normal.      Pupils: Pupils are equal, round, and reactive to light. Cardiovascular:      Rate and Rhythm: Normal rate and regular rhythm. Heart sounds: Normal heart sounds. No murmur. No friction rub. No gallop. Pulmonary:      Effort: Pulmonary effort is normal. No respiratory distress. Breath sounds: Normal breath sounds. No wheezing or rales. Abdominal:      General: Bowel sounds are normal. There is no distension. Palpations: Abdomen is soft. Tenderness: There is no abdominal tenderness. There is no guarding or rebound. Skin:     General: Skin is warm and dry. Neurological:      Mental Status: He is alert and oriented to person, place, and time.    Psychiatric:         Mood and Affect: Affect normal.         Cognition and Memory: Memory normal.         Judgment: Judgment normal.           ASSESSMENT AND PLAN  Luis Angel Rodriguez is a 78 y.o. male who presents today for:    1. Medicare annual wellness visit, subsequent  See progress note. 2. Controlled type 2 diabetes mellitus without complication, without long-term current use of insulin (HCC)  Stable, continue current treatment pending review of labs. I have reviewed/discussed the above normal BMI with the patient. I have recommended the following interventions: dietary management education, guidance, and counseling, encourage exercise, monitor weight and prescribed dietary intake.   - HEMOGLOBIN A1C WITH EAG  - MICROALBUMIN, UR, RAND W/ MICROALB/CREAT RATIO  - lancets misc; Test fasting glucose daily; Dx E11.8; Pharmacist to choose based on insurance/glucose monitoring kit type  Dispense: 100 Each; Refill: 3    3. HTN, goal below 140/90  Stable, continue current treatment. 4. Hyperlipidemia, unspecified hyperlipidemia type  - METABOLIC PANEL, COMPREHENSIVE  - LIPID PANEL    5. Prostate cancer screening  - PSA, DIAGNOSTIC (PROSTATE SPECIFIC AG)    6. Acoustic neuroma (HCC)     There are no discontinued medications. Follow-up and Dispositions    · Return in about 6 months (around 9/12/2020) for DM follow up. Medication risks/benefits/costs/interactions/alternatives discussed with patient. Advised patient to call back or return to office if symptoms worsen/change/persist. If patient cannot reach us or should anything more severe/urgent arise he/she should proceed directly to the nearest emergency department. Discussed expected course/resolution/complications of diagnosis in detail with patient. Patient given a written after visit summary which includes his/her diagnoses, current medications and vitals. Patient expressed understanding with the diagnosis and plan. Rom Valdez M.D. Diagnoses and all orders for this visit:    1. Medicare annual wellness visit, subsequent    2.  Controlled type 2 diabetes mellitus without complication, without long-term current use of insulin [facility])  Assessment & Plan:  Stable, based on history, physical exam and review of pertinent labs, studies and medications; meds reconciled; continue current treatment plan. Key Antihyperglycemic Medications             metFORMIN (GLUCOPHAGE) 500 mg tablet (Taking) TAKE 2 TABLETS TWICE A DAY WITH MEALS    SITagliptin (JANUVIA) 100 mg tablet (Taking) TAKE 1 TABLET DAILY        Other Key Diabetic Medications             olmesartan (BENICAR) 20 mg tablet (Taking) Take 1 Tab by mouth daily. pravastatin (PRAVACHOL) 20 mg tablet (Taking) TAKE 1 TABLET DAILY        Lab Results   Component Value Date/Time    Hemoglobin A1c 6.8 12/26/2018 07:34 AM    Hemoglobin A1c (POC) 6.7 09/12/2019 02:36 PM    Glucose 147 03/15/2019 03:15 PM    Creatinine 0.89 03/15/2019 03:15 PM    Microalb/Creat ratio (ug/mg creat.) <3.0 12/26/2018 07:34 AM    Cholesterol, total 122 12/26/2018 07:34 AM    HDL Cholesterol 40 12/26/2018 07:34 AM    LDL, calculated 67 12/26/2018 07:34 AM    Triglyceride 73 12/26/2018 07:34 AM     Diabetic Foot and Eye Exam HM Status   Topic Date Due    Diabetic Foot Care  01/12/2018    Eye Exam  11/02/2020       Orders:  -     HEMOGLOBIN A1C WITH EAG  -     MICROALBUMIN, UR, RAND W/ MICROALB/CREAT RATIO  -     lancets misc; Test fasting glucose daily; Dx E11.8; Pharmacist to choose based on insurance/glucose monitoring kit type    3. HTN, goal below 140/90    4. Hyperlipidemia, unspecified hyperlipidemia type  -     METABOLIC PANEL, COMPREHENSIVE  -     LIPID PANEL    5. Prostate cancer screening  -     PSA, DIAGNOSTIC (PROSTATE SPECIFIC AG)    6. Acoustic neuroma [facility])  Assessment & Plan: This condition is managed by Specialist.  Key Oncology Meds     Patient is on no Oncologic meds. Key Pain Meds     The patient is on no pain meds. Lab Results   Component Value Date/Time    WBC 7.3 03/15/2019 03:15 PM    ABS.  NEUTROPHILS 5.0 03/15/2019 03:15 PM    HGB 13.7 03/15/2019 03:15 PM    HCT 40.0 03/15/2019 03:15 PM    PLATELET 803 70/31/4174 03:15 PM    Creatinine 0.89 03/15/2019 03:15 PM    BUN 13 03/15/2019 03:15 PM    ALT (SGPT) 13 03/15/2019 03:15 PM    AST (SGOT) 13 03/15/2019 03:15 PM    Albumin 4.7 03/15/2019 03:15 PM    Prostate Specific Ag 1.1 03/15/2019 03:15 PM

## 2020-03-12 NOTE — ASSESSMENT & PLAN NOTE
This condition is managed by Specialist.  Key Oncology Meds     Patient is on no Oncologic meds. Key Pain Meds     The patient is on no pain meds. Lab Results   Component Value Date/Time    WBC 7.3 03/15/2019 03:15 PM    ABS.  NEUTROPHILS 5.0 03/15/2019 03:15 PM    HGB 13.7 03/15/2019 03:15 PM    HCT 40.0 03/15/2019 03:15 PM    PLATELET 981 53/02/8705 03:15 PM    Creatinine 0.89 03/15/2019 03:15 PM    BUN 13 03/15/2019 03:15 PM    ALT (SGPT) 13 03/15/2019 03:15 PM    AST (SGOT) 13 03/15/2019 03:15 PM    Albumin 4.7 03/15/2019 03:15 PM    Prostate Specific Ag 1.1 03/15/2019 03:15 PM

## 2020-03-12 NOTE — PROGRESS NOTES
Chief Complaint   Patient presents with   [email or web address] Annual Wellness Visit     1. Have you been to the ER, urgent care clinic since your last visit? Hospitalized since your last visit? No    2. Have you seen or consulted any other health care providers outside of the [facility] since your last visit? Include any pap smears or colon screening. Patient was seen at ENT for chronic cough.

## 2020-03-12 NOTE — PROGRESS NOTES
This is the Subsequent Medicare Annual Wellness Exam, performed 12 months or more after the Initial AWV or the last Subsequent AWV    I have reviewed the patient's medical history in detail and updated the computerized patient record. History     Patient Active Problem List   Diagnosis Code    Diabetes mellitus type 2, controlled, without complications ([address] G79.2    H/O blastomycosis Z86.19    Hyperlipidemia with target LDL less than 100 E78.5    Acoustic neuroma ([address] D33.3    HTN, goal below 140/90 I10     Past Medical History:   Diagnosis Date    Acoustic neuroma ([address] 05/28/2018    left ear    Blastomycosis 1990    (treated with Sporanox) -Left Lung    Diabetes mellitus type 2, controlled, without complications ([address] 19/63/4481    History of colonic polyps     HTN, goal below 140/90 6/11/2019      Past Surgical History:   Procedure Laterality Date    HX ANKLE FRACTURE TX Right 01/2018    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    HX ANKLE FRACTURE TX Left 01/2019    HX COLONOSCOPY  2005    several polyps    HX HEENT      tonsillectomy (age 10)   [facility] OR[facility]IC  45    L Plantar fascia trimmed     Current Outpatient Medications   Medication Sig Dispense Refill    omeprazole (PRILOSEC) 40 mg capsule TAKE 1 CAPSULE BY MOUTH 1 HOUR BEFORE DINNER      lancets misc Test fasting glucose daily; Dx E11.8; Pharmacist to choose based on insurance/glucose monitoring kit type 100 Each 3    metFORMIN (GLUCOPHAGE) 500 mg tablet TAKE 2 TABLETS TWICE A DAY WITH MEALS 360 Tab 2    glucose blood VI test strips (ONETOUCH ULTRA TEST) strip Use to check blood sugar once daily. DM E11.9 100 Strip 3    olmesartan (BENICAR) 20 mg tablet Take 1 Tab by mouth daily. 90 Tab 2    pravastatin (PRAVACHOL) 20 mg tablet TAKE 1 TABLET DAILY 90 Tab 2    SITagliptin (JANUVIA) 100 mg tablet TAKE 1 TABLET DAILY 90 Tab 2    aspirin delayed-release 81 mg tablet Take 81 mg by mouth daily.       multivitamin (ONE A DAY) tablet Take 1 Tab by mouth daily. Allergies   Allergen Reactions    Iodine Nausea Only     Shrimp       Family History   Problem Relation Age of Onset    Cancer Father         prostate    Diabetes Father         adult-onset    Stroke Father     Dementia Mother     Psychiatric Disorder Mother     Diabetes Mother      Social History     Tobacco Use    Smoking status: Never Smoker    Smokeless tobacco: Never Used   Substance Use Topics    Alcohol use: Yes     Alcohol/week: 0.0 standard drinks     Comment: rare to occasional       Depression Risk Factor Screening:     3 most recent PHQ Screens 3/12/2020   Little interest or pleasure in doing things Not at all   Feeling down, depressed, irritable, or hopeless Not at all   Total Score PHQ 2 0       Alcohol Risk Factor Screening (MALE > 65): Do you average more 1 drink per night or more than 7 drinks a week: No    In the past three months have you have had more than 4 drinks containing alcohol on one occasion: No      Functional Ability and Level of Safety:   Hearing: left ear is affected due to acoustic neuroma    Activities of Daily Living: The home contains: no safety equipment. Patient does total self care    Ambulation: with no difficulty    Fall Risk:  Fall Risk Assessment, last 12 mths 3/12/2020   Able to walk? Yes   Fall in past 12 months? No       Abuse Screen:  Patient is not abused    Cognitive Screening   Has your family/caregiver stated any concerns about your memory: no      Patient Care Team   Patient Care Team:  Floyd Choudhary MD as PCP - General (Family Practice)  Floyd Choudhary MD as PCP - [facility]  Tika Bailey MD as Physician (Otolaryngology)    Assessment/Plan   Education and counseling provided:  Are appropriate based on today's review and evaluation    Diagnoses and all orders for this visit:    1. Medicare annual wellness visit, subsequent    2.  Controlled type 2 diabetes mellitus without complication, without long-term current use of insulin (HCC)  Assessment & Plan:  Stable, based on history, physical exam and review of pertinent labs, studies and medications; meds reconciled; continue current treatment plan. Key Antihyperglycemic Medications             metFORMIN (GLUCOPHAGE) 500 mg tablet (Taking) TAKE 2 TABLETS TWICE A DAY WITH MEALS    SITagliptin (JANUVIA) 100 mg tablet (Taking) TAKE 1 TABLET DAILY        Other Key Diabetic Medications             olmesartan (BENICAR) 20 mg tablet (Taking) Take 1 Tab by mouth daily. pravastatin (PRAVACHOL) 20 mg tablet (Taking) TAKE 1 TABLET DAILY        Lab Results   Component Value Date/Time    Hemoglobin A1c 6.8 12/26/2018 07:34 AM    Hemoglobin A1c (POC) 6.7 09/12/2019 02:36 PM    Glucose 147 03/15/2019 03:15 PM    Creatinine 0.89 03/15/2019 03:15 PM    Microalb/Creat ratio (ug/mg creat.) <3.0 12/26/2018 07:34 AM    Cholesterol, total 122 12/26/2018 07:34 AM    HDL Cholesterol 40 12/26/2018 07:34 AM    LDL, calculated 67 12/26/2018 07:34 AM    Triglyceride 73 12/26/2018 07:34 AM     Diabetic Foot and Eye Exam HM Status   Topic Date Due    Diabetic Foot Care  01/12/2018    Eye Exam  11/02/2020       Orders:  -     HEMOGLOBIN A1C WITH EAG  -     MICROALBUMIN, UR, RAND W/ MICROALB/CREAT RATIO  -     lancets misc; Test fasting glucose daily; Dx E11.8; Pharmacist to choose based on insurance/glucose monitoring kit type    3. HTN, goal below 140/90    4. Hyperlipidemia, unspecified hyperlipidemia type  -     METABOLIC PANEL, COMPREHENSIVE  -     LIPID PANEL    5. Prostate cancer screening  -     PSA, DIAGNOSTIC (PROSTATE SPECIFIC AG)    6. Acoustic neuroma [facility])  Assessment & Plan: This condition is managed by Specialist.  Key Oncology Meds     Patient is on no Oncologic meds. Key Pain Meds     The patient is on no pain meds. Lab Results   Component Value Date/Time    WBC 7.3 03/15/2019 03:15 PM    ABS.  NEUTROPHILS 5.0 03/15/2019 03:15 PM    HGB 13.7 03/15/2019 03:15 PM    HCT 40.0 03/15/2019 03:15 PM    PLATELET 899 90/68/7620 03:15 PM    Creatinine 0.89 03/15/2019 03:15 PM    BUN 13 03/15/2019 03:15 PM    ALT (SGPT) 13 03/15/2019 03:15 PM    AST (SGOT) 13 03/15/2019 03:15 PM    Albumin 4.7 03/15/2019 03:15 PM    Prostate Specific Ag 1.1 03/15/2019 03:15 PM           Health Maintenance Due   Topic Date Due    Colonoscopy  10/07/1967    Shingrix Vaccine Age 50> (1 of 2) 10/07/1999    Foot Exam Q1  01/12/2018    MICROALBUMIN Q1  12/26/2019    Lipid Screen  12/26/2019    Medicare Yearly Exam  03/15/2020

## 2020-03-16 ENCOUNTER — HOSPITAL ENCOUNTER (OUTPATIENT)
Dept: LAB | Age: 70
Discharge: HOME | End: 2020-03-16
Payer: MEDICARE

## 2020-03-16 PROCEDURE — 83036 HEMOGLOBIN GLYCOSYLATED A1C: CPT

## 2020-03-16 PROCEDURE — 84153 ASSAY OF PSA TOTAL: CPT

## 2020-03-16 PROCEDURE — 80053 COMPREHEN METABOLIC PANEL: CPT

## 2020-03-16 PROCEDURE — 80061 LIPID PANEL: CPT

## 2020-03-16 PROCEDURE — 82043 UR ALBUMIN QUANTITATIVE: CPT

## 2020-03-17 LAB
ALBUMIN SERPL-MCNC: 4.7 G/DL (ref 3.8–4.8)
ALBUMIN/CREAT UR: 4 MG/G CREAT (ref 0–29)
ALBUMIN/GLOB SERPL: 2.2 {RATIO} (ref 1.2–2.2)
ALP SERPL-CCNC: 79 IU/L (ref 39–117)
ALT SERPL-CCNC: 21 IU/L (ref 0–44)
AST SERPL-CCNC: 19 IU/L (ref 0–40)
BILIRUB SERPL-MCNC: 0.5 MG/DL (ref 0–1.2)
BUN SERPL-MCNC: 11 MG/DL (ref 8–27)
BUN/CREAT SERPL: 13 (ref 10–24)
CALCIUM SERPL-MCNC: 9.7 MG/DL (ref 8.6–10.2)
CHLORIDE SERPL-SCNC: 103 MMOL/L (ref 96–106)
CHOLEST SERPL-MCNC: 122 MG/DL (ref 100–199)
CO2 SERPL-SCNC: 24 MMOL/L (ref 20–29)
CREAT SERPL-MCNC: 0.84 MG/DL (ref 0.76–1.27)
CREAT UR-MCNC: 197.1 MG/DL
EST. AVERAGE GLUCOSE BLD GHB EST-MCNC: 154 MG/DL
GLOBULIN SER CALC-MCNC: 2.1 G/DL (ref 1.5–4.5)
GLUCOSE SERPL-MCNC: 179 MG/DL (ref 65–99)
HBA1C MFR BLD: 7 % (ref 4.8–5.6)
HDLC SERPL-MCNC: 42 MG/DL
INTERPRETATION, 910389: NORMAL
LDLC SERPL CALC-MCNC: 65 MG/DL (ref 0–99)
MICROALBUMIN UR-MCNC: 7.7 UG/ML
POTASSIUM SERPL-SCNC: 4.4 MMOL/L (ref 3.5–5.2)
PROT SERPL-MCNC: 6.8 G/DL (ref 6–8.5)
PSA SERPL-MCNC: 1.2 NG/ML (ref 0–4)
SODIUM SERPL-SCNC: 144 MMOL/L (ref 134–144)
TRIGL SERPL-MCNC: 77 MG/DL (ref 0–149)
VLDLC SERPL CALC-MCNC: 15 MG/DL (ref 5–40)

## 2020-03-17 NOTE — PROGRESS NOTES
Dear  Teri Payton,    I wanted to follow up on your recent test results: Your A1c is higher at 7.0. We could consider adding a 3rd medication to help reduce your sugars or if you think you make improvements in your food choices and exercise, we can keep the medications the same and re-evaluate in 3 months. Other labs are normal/stable. Please let me know your thoughts.

## 2020-06-03 DIAGNOSIS — E11.9 CONTROLLED TYPE 2 DIABETES MELLITUS WITHOUT COMPLICATION, WITHOUT LONG-TERM CURRENT USE OF INSULIN (HCC): ICD-10-CM

## 2020-06-03 NOTE — TELEPHONE ENCOUNTER
----- Message from Eliot Matthews sent at 6/3/2020  7:33 AM EDT -----  Regarding: Prescription Question  Contact: [phone]  I need a refill for 30 days of Januvia. I would like to pick it up at the [facility] at [address] [place] [address]. I am requesting this rather than using the [facility] [employer] 90 day process because I would like to know if there is a cheaper substitute that is just as effective.   Thanks,  Eliot Mtathews

## 2020-06-25 DIAGNOSIS — E11.9 CONTROLLED TYPE 2 DIABETES MELLITUS WITHOUT COMPLICATION, WITHOUT LONG-TERM CURRENT USE OF INSULIN (HCC): ICD-10-CM

## 2020-07-31 DIAGNOSIS — E11.9 CONTROLLED TYPE 2 DIABETES MELLITUS WITHOUT COMPLICATION, WITHOUT LONG-TERM CURRENT USE OF INSULIN (HCC): ICD-10-CM

## 2020-07-31 NOTE — TELEPHONE ENCOUNTER
Last visit 03/12/2020 MD Josué Denton   Next appointment 6 months (09/2020)   Previous refill encounter(s) 06/26/2020 Januvia #30     Requested Prescriptions     Pending Prescriptions Disp Refills    SITagliptin (Januvia) 100 mg tablet 90 Tab 0     Sig: Take 1 Tab by mouth daily.

## 2020-09-10 ENCOUNTER — PATIENT MESSAGE (OUTPATIENT)
Dept: FAMILY MEDICINE CLINIC | Age: 70
End: 2020-09-10

## 2020-09-10 DIAGNOSIS — E11.9 CONTROLLED TYPE 2 DIABETES MELLITUS WITHOUT COMPLICATION, WITHOUT LONG-TERM CURRENT USE OF INSULIN (HCC): ICD-10-CM

## 2020-09-11 RX ORDER — PRAVASTATIN SODIUM 20 MG/1
TABLET ORAL
Qty: 30 TAB | Refills: 0 | Status: SHIPPED | OUTPATIENT
Start: 2020-09-11 | End: 2020-10-05

## 2020-09-11 RX ORDER — LANCETS
EACH MISCELLANEOUS
Qty: 100 EACH | Refills: 0 | Status: SHIPPED | OUTPATIENT
Start: 2020-09-11 | End: 2021-01-11

## 2020-10-04 DIAGNOSIS — I10 ESSENTIAL HYPERTENSION: ICD-10-CM

## 2020-10-04 DIAGNOSIS — E11.9 CONTROLLED TYPE 2 DIABETES MELLITUS WITHOUT COMPLICATION, WITHOUT LONG-TERM CURRENT USE OF INSULIN (HCC): ICD-10-CM

## 2020-10-05 RX ORDER — PRAVASTATIN SODIUM 20 MG/1
TABLET ORAL
Qty: 30 TAB | Refills: 0 | Status: SHIPPED | OUTPATIENT
Start: 2020-10-05 | End: 2021-02-02

## 2020-10-05 RX ORDER — OLMESARTAN MEDOXOMIL 20 MG/1
20 TABLET ORAL DAILY
Qty: 30 TAB | Refills: 0 | Status: SHIPPED | OUTPATIENT
Start: 2020-10-05 | End: 2020-10-20 | Stop reason: SDUPTHER

## 2020-10-15 DIAGNOSIS — E11.9 CONTROLLED TYPE 2 DIABETES MELLITUS WITHOUT COMPLICATION, WITHOUT LONG-TERM CURRENT USE OF INSULIN (HCC): ICD-10-CM

## 2020-10-15 NOTE — TELEPHONE ENCOUNTER
MD Ady Alva,    Request for 90 d/s from pharmacy. Patient does have appointment set. Thanks, Kristan Nguyen    Last Visit: 3/12/20  MD Ady Alva  Next Appointment: 10/19/20  MD Lerma  Previous Refill Encounter(s): 9/2/20  30 (requested appointment)    Requested Prescriptions     Pending Prescriptions Disp Refills    SITagliptin (Januvia) 100 mg tablet 90 Tab 1     Sig: Take 1 Tab by mouth daily.

## 2020-10-19 ENCOUNTER — PATIENT MESSAGE (OUTPATIENT)
Dept: FAMILY MEDICINE CLINIC | Age: 70
End: 2020-10-19

## 2020-10-19 ENCOUNTER — OFFICE VISIT (OUTPATIENT)
Dept: FAMILY MEDICINE CLINIC | Age: 70
End: 2020-10-19
Payer: MEDICARE

## 2020-10-19 VITALS
BODY MASS INDEX: 27.25 KG/M2 | TEMPERATURE: 98.4 F | HEART RATE: 69 BPM | SYSTOLIC BLOOD PRESSURE: 158 MMHG | RESPIRATION RATE: 18 BRPM | DIASTOLIC BLOOD PRESSURE: 80 MMHG | OXYGEN SATURATION: 99 % | HEIGHT: 69 IN | WEIGHT: 184 LBS

## 2020-10-19 DIAGNOSIS — I10 HTN, GOAL BELOW 140/90: ICD-10-CM

## 2020-10-19 DIAGNOSIS — Z23 ENCOUNTER FOR IMMUNIZATION: ICD-10-CM

## 2020-10-19 DIAGNOSIS — E11.9 CONTROLLED TYPE 2 DIABETES MELLITUS WITHOUT COMPLICATION, WITHOUT LONG-TERM CURRENT USE OF INSULIN (HCC): Primary | ICD-10-CM

## 2020-10-19 LAB — HBA1C MFR BLD HPLC: 6.7 %

## 2020-10-19 PROCEDURE — G8417 CALC BMI ABV UP PARAM F/U: HCPCS | Performed by: FAMILY MEDICINE

## 2020-10-19 PROCEDURE — 2022F DILAT RTA XM EVC RTNOPTHY: CPT | Performed by: FAMILY MEDICINE

## 2020-10-19 PROCEDURE — 1101F PT FALLS ASSESS-DOCD LE1/YR: CPT | Performed by: FAMILY MEDICINE

## 2020-10-19 PROCEDURE — G8753 SYS BP > OR = 140: HCPCS | Performed by: FAMILY MEDICINE

## 2020-10-19 PROCEDURE — 99213 OFFICE O/P EST LOW 20 MIN: CPT | Performed by: FAMILY MEDICINE

## 2020-10-19 PROCEDURE — 3017F COLORECTAL CA SCREEN DOC REV: CPT | Performed by: FAMILY MEDICINE

## 2020-10-19 PROCEDURE — G0463 HOSPITAL OUTPT CLINIC VISIT: HCPCS | Performed by: FAMILY MEDICINE

## 2020-10-19 PROCEDURE — G8427 DOCREV CUR MEDS BY ELIG CLIN: HCPCS | Performed by: FAMILY MEDICINE

## 2020-10-19 PROCEDURE — G8432 DEP SCR NOT DOC, RNG: HCPCS | Performed by: FAMILY MEDICINE

## 2020-10-19 PROCEDURE — 3044F HG A1C LEVEL LT 7.0%: CPT | Performed by: FAMILY MEDICINE

## 2020-10-19 PROCEDURE — G8754 DIAS BP LESS 90: HCPCS | Performed by: FAMILY MEDICINE

## 2020-10-19 PROCEDURE — G8536 NO DOC ELDER MAL SCRN: HCPCS | Performed by: FAMILY MEDICINE

## 2020-10-19 PROCEDURE — 83036 HEMOGLOBIN GLYCOSYLATED A1C: CPT | Performed by: FAMILY MEDICINE

## 2020-10-19 PROCEDURE — 90653 IIV ADJUVANT VACCINE IM: CPT

## 2020-10-19 RX ORDER — ZOSTER VACCINE RECOMBINANT, ADJUVANTED 50 MCG/0.5
0.5 KIT INTRAMUSCULAR ONCE
Qty: 0.5 ML | Refills: 1 | Status: SHIPPED | OUTPATIENT
Start: 2020-10-19 | End: 2020-10-19

## 2020-10-19 NOTE — PROGRESS NOTES
Patient Name: Kaylin Cardoza   MRN: [id number]    Amandeep Lindsey is a 69 y.o. male who presents with the following:     He is seen for diabetes and hypertension. Since last visit he reports no significant changes. Home glucose monitoring: is not performed. He reports medication compliance: compliant all of the time. Medication side effects: none. Diabetic diet compliance: compliant most of the time. Lab review: A1c today is 6.7. Eye exam: overdue. Januvia no longer covered by insurance; pt would like alternative. Home BPs have been normal. Had several cups of coffee prior to appt. Lab Results   Component Value Date/Time    Hemoglobin A1c 7.0 (H) 03/16/2020 08:36 AM    Hemoglobin A1c (POC) 6.7 10/19/2020 08:54 AM     Wt Readings from Last 3 Encounters:   10/19/20 184 lb (83.5 kg)   03/12/20 187 lb 6.4 oz (85 kg)   02/10/20 185 lb 12.8 oz (84.3 kg)     BP Readings from Last 3 Encounters:   10/19/20 (!) 158/80   03/12/20 140/80   02/10/20 (!) 147/91     Review of Systems   Constitutional: Negative for fever, malaise/fatigue and weight loss. Respiratory: Negative for cough, hemoptysis, shortness of breath and wheezing. Cardiovascular: Negative for chest pain, palpitations, leg swelling and PND. Gastrointestinal: Negative for abdominal pain, constipation, diarrhea, nausea and vomiting. The patient's medications, allergies, past medical history, surgical history, family history and social history were reviewed and updated where appropriate. Prior to Admission medications    Medication Sig Start Date End Date Taking? Authorizing Provider   SITagliptin (Januvia) 100 mg tablet Take 1 Tab by mouth daily. 10/15/20  Yes Neetu Ramey MD   pravastatin (PRAVACHOL) 20 mg tablet TAKE 1 TABLET BY MOUTH EVERY DAY 10/5/20  Yes Neetu Ramey MD   olmesartan (BENICAR) 20 mg tablet Take 1 Tab by mouth daily. 10/5/20  Yes Neetu Ramey MD   lancets misc Test fasting glucose daily;  Dx E11.8; Pharmacist to choose based on insurance/glucose monitoring kit type 9/11/20  Yes Neetu Ramey MD   metFORMIN (GLUCOPHAGE) 500 mg tablet TAKE 2 TABLETS TWICE A DAY WITH MEALS 1/23/20  Yes Neetu Ramey MD   glucose blood VI test strips (ONETOUCH ULTRA TEST) strip Use to check blood sugar once daily. DM E11.9 1/23/20  Yes Neetu Ramey MD   aspirin delayed-release 81 mg tablet Take 81 mg by mouth daily. Yes Provider, Historical   multivitamin (ONE A DAY) tablet Take 1 Tab by mouth daily. Yes Provider, Historical   omeprazole (PRILOSEC) 40 mg capsule TAKE 1 CAPSULE BY MOUTH 1 HOUR BEFORE DINNER 2/25/20   Provider, Historical       Allergies   Allergen Reactions    Iodine Nausea Only     Shrimp         OBJECTIVE    Visit Vitals  BP (!) 158/80 (BP 1 Location: Left arm, BP Patient Position: Sitting)   Pulse 69   Temp 98.4 °F (36.9 °C) (Temporal)   Resp 18   Ht 5' 9\" (1.753 m)   Wt 184 lb (83.5 kg)   SpO2 99%   BMI 27.17 kg/m²       Physical Exam  Vitals signs and nursing note reviewed. Constitutional:       General: He is not in acute distress. Appearance: Normal appearance. He is not toxic-appearing. HENT:      Head: Normocephalic and atraumatic. Eyes:      Pupils: Pupils are equal, round, and reactive to light. Pulmonary:      Effort: Pulmonary effort is normal.   Musculoskeletal: Normal range of motion. Skin:     General: Skin is warm and dry. Neurological:      Mental Status: He is alert. Mental status is at baseline. Psychiatric:         Mood and Affect: Mood normal.         Behavior: Behavior normal.           ASSESSMENT AND PLAN  Kaylin Cardoza is a 69 y.o. male who presents today for:    1. Controlled type 2 diabetes mellitus without complication, without long-term current use of insulin (HCC)  Stable, continue current treatment. Pt to send me a message on which Januvia alternatives are approved by his insurance. - AMB POC HEMOGLOBIN A1C    2.  HTN, goal below 140/90  Pt to monitor BPs at home. Keep meds the same. 3. Encounter for immunization  - INFLUENZA VACCINE INACTIVATED (IIV), SUBUNIT, ADJUVANTED, IM       Medications Discontinued During This Encounter   Medication Reason    omeprazole (PRILOSEC) 40 mg capsule        Follow-up and Dispositions    · Return in about 3 months (around 1/19/2021) for DM follow up. Treatment risks/benefits/costs/interactions/alternatives discussed with patient. Advised patient to call back or return to office if symptoms worsen/change/persist. If patient cannot reach us or should anything more severe/urgent arise he/she should proceed directly to the nearest emergency department. Discussed expected course/resolution/complications of diagnosis in detail with patient. Patient expressed understanding with the diagnosis and plan. Aivi N. Unice Babinski M.D.  '

## 2020-10-19 NOTE — PROGRESS NOTES
Chief Complaint   Patient presents with    Diabetes     follow up    Medication Question     discuss Januvia      Patient would like to discuss labs     1. Have you been to the ER, urgent care clinic since your last visit? Hospitalized since your last visit? No    2. Have you seen or consulted any other health care providers outside of the [address] [place] [address] since your last visit? Include any pap smears or colon screening. No    Patient received flu vaccine in right deltoid today with no adverse reactions. Patient tolerated vaccine well.

## 2020-10-20 DIAGNOSIS — I10 ESSENTIAL HYPERTENSION: ICD-10-CM

## 2020-10-20 RX ORDER — GLIPIZIDE 5 MG/1
5 TABLET, FILM COATED, EXTENDED RELEASE ORAL DAILY
Qty: 30 TAB | Refills: 2 | Status: SHIPPED | OUTPATIENT
Start: 2020-10-20 | End: 2021-01-11

## 2020-10-20 NOTE — TELEPHONE ENCOUNTER
Dara Pollard MD 10/20/2020 12:10 PM EDT      ----- Message -----  From: Ana M Padilla  Sent: 10/19/2020 3:44 PM EDT  To: [facility] Nurse Pool  Subject: RE: Prescription Question     Let's give glipizide a try at the lowest dose. I use one-touch ultra test strips and device. Do I need an additional device to check blood sugars ? I usually check it in the morning before breakfast. Thanks.

## 2020-10-20 NOTE — TELEPHONE ENCOUNTER
Last Visit: 10/19/20  MD Michelle Loaiza  Next Appointment: Not scheduled- due 1/21  Previous Refill Encounter(s): 10/5/20  30    Requested Prescriptions     Pending Prescriptions Disp Refills    olmesartan (BENICAR) 20 mg tablet 90 Tab 1     Sig: Take 1 Tab by mouth daily.

## 2020-10-21 ENCOUNTER — TELEPHONE (OUTPATIENT)
Dept: FAMILY MEDICINE CLINIC | Age: 70
End: 2020-10-21

## 2020-10-21 NOTE — TELEPHONE ENCOUNTER
----- Message from Khari Dang MD sent at 10/19/2020  9:26 AM EDT -----  Please get last colonoscopy from GI.

## 2020-10-21 NOTE — TELEPHONE ENCOUNTER
Last Colonoscopy requested from Dr. Bart Worley office via fax  on 10/21/2020. Confirmation received.

## 2020-10-22 RX ORDER — OLMESARTAN MEDOXOMIL 20 MG/1
20 TABLET ORAL DAILY
Qty: 90 TAB | Refills: 1 | Status: SHIPPED | OUTPATIENT
Start: 2020-10-22 | End: 2020-11-19

## 2020-11-10 DIAGNOSIS — E11.9 CONTROLLED TYPE 2 DIABETES MELLITUS WITHOUT COMPLICATION, WITHOUT LONG-TERM CURRENT USE OF INSULIN (HCC): ICD-10-CM

## 2020-11-12 RX ORDER — METFORMIN HYDROCHLORIDE 500 MG/1
TABLET ORAL
Qty: 360 TAB | Refills: 2 | Status: SHIPPED | OUTPATIENT
Start: 2020-11-12 | End: 2021-07-29

## 2020-11-18 ENCOUNTER — PATIENT MESSAGE (OUTPATIENT)
Dept: FAMILY MEDICINE CLINIC | Age: 70
End: 2020-11-18

## 2020-11-19 RX ORDER — LOSARTAN POTASSIUM 50 MG/1
50 TABLET ORAL DAILY
Qty: 90 TAB | Refills: 2 | Status: SHIPPED | OUTPATIENT
Start: 2020-11-19 | End: 2021-07-09

## 2020-11-19 NOTE — TELEPHONE ENCOUNTER
Koby Rosario MD 11/19/2020 2:29 PM EST      ----- Message -----  From: Jessie Williamson  Sent: 11/19/2020 12:53 PM EST  To: [facility] Nurse Pool  Subject: RE: Prescription Question     [id number] Jaki Gage, let's try losartan. Would you send the RX to [facility] for mail order? I am in their system. Thanks!   Jessie Williamson

## 2021-01-15 RX ORDER — BLOOD SUGAR DIAGNOSTIC
STRIP MISCELLANEOUS
Qty: 100 STRIP | Refills: 3 | Status: SHIPPED | OUTPATIENT
Start: 2021-01-15 | End: 2022-02-07

## 2021-01-29 ENCOUNTER — IMMUNIZATION (OUTPATIENT)
Dept: FAMILY MEDICINE CLINIC | Age: 71
End: 2021-01-29
Payer: MEDICARE

## 2021-01-29 DIAGNOSIS — Z23 ENCOUNTER FOR IMMUNIZATION: Primary | ICD-10-CM

## 2021-01-29 PROCEDURE — 91301 COVID-19, MRNA, LNP-S, PF, 100MCG/0.5ML DOSE(MODERNA): CPT | Performed by: FAMILY MEDICINE

## 2021-01-29 PROCEDURE — 0011A PR IMM ADMN SARSCOV2 100 MCG/0.5 ML 1ST DOSE: CPT | Performed by: FAMILY MEDICINE

## 2021-02-02 DIAGNOSIS — E11.9 CONTROLLED TYPE 2 DIABETES MELLITUS WITHOUT COMPLICATION, WITHOUT LONG-TERM CURRENT USE OF INSULIN (HCC): ICD-10-CM

## 2021-02-02 RX ORDER — PRAVASTATIN SODIUM 20 MG/1
TABLET ORAL
Qty: 90 TAB | Refills: 2 | Status: SHIPPED | OUTPATIENT
Start: 2021-02-02 | End: 2021-10-25

## 2021-02-26 ENCOUNTER — TRANSCRIBE ORDER (OUTPATIENT)
Dept: SCHEDULING | Age: 71
End: 2021-02-26

## 2021-02-26 ENCOUNTER — HOSPITAL ENCOUNTER (OUTPATIENT)
Dept: CT IMAGING | Age: 71
Discharge: HOME | End: 2021-02-26
Attending: NEUROLOGICAL SURGERY
Payer: MEDICARE

## 2021-02-26 DIAGNOSIS — D33.3 ACOUSTIC NEUROMA (HCC): ICD-10-CM

## 2021-02-26 DIAGNOSIS — D33.3 ACOUSTIC NEUROMA (HCC): Primary | ICD-10-CM

## 2021-02-26 PROCEDURE — 70450 CT HEAD/BRAIN W/O DYE: CPT

## 2021-03-01 ENCOUNTER — IMMUNIZATION (OUTPATIENT)
Dept: FAMILY MEDICINE CLINIC | Age: 71
End: 2021-03-01
Payer: MEDICARE

## 2021-03-01 DIAGNOSIS — Z23 ENCOUNTER FOR IMMUNIZATION: Primary | ICD-10-CM

## 2021-03-01 PROCEDURE — 0012A COVID-19, MRNA, LNP-S, PF, 100MCG/0.5ML DOSE(MODERNA): CPT | Performed by: FAMILY MEDICINE

## 2021-03-01 PROCEDURE — 91301 COVID-19, MRNA, LNP-S, PF, 100MCG/0.5ML DOSE(MODERNA): CPT | Performed by: FAMILY MEDICINE

## 2021-03-22 ENCOUNTER — OFFICE VISIT (OUTPATIENT)
Dept: FAMILY MEDICINE CLINIC | Age: 71
End: 2021-03-22
Payer: MEDICARE

## 2021-03-22 VITALS
HEIGHT: 69 IN | OXYGEN SATURATION: 96 % | BODY MASS INDEX: 27.99 KG/M2 | RESPIRATION RATE: 18 BRPM | DIASTOLIC BLOOD PRESSURE: 70 MMHG | WEIGHT: 189 LBS | SYSTOLIC BLOOD PRESSURE: 137 MMHG | TEMPERATURE: 97 F | HEART RATE: 74 BPM

## 2021-03-22 DIAGNOSIS — E11.9 CONTROLLED TYPE 2 DIABETES MELLITUS WITHOUT COMPLICATION, WITHOUT LONG-TERM CURRENT USE OF INSULIN (HCC): Primary | ICD-10-CM

## 2021-03-22 DIAGNOSIS — I10 HTN, GOAL BELOW 140/90: ICD-10-CM

## 2021-03-22 DIAGNOSIS — H81.10 BENIGN PAROXYSMAL POSITIONAL VERTIGO, UNSPECIFIED LATERALITY: ICD-10-CM

## 2021-03-22 LAB — HBA1C MFR BLD HPLC: 6.7 %

## 2021-03-22 PROCEDURE — G8427 DOCREV CUR MEDS BY ELIG CLIN: HCPCS | Performed by: FAMILY MEDICINE

## 2021-03-22 PROCEDURE — G8536 NO DOC ELDER MAL SCRN: HCPCS | Performed by: FAMILY MEDICINE

## 2021-03-22 PROCEDURE — G8752 SYS BP LESS 140: HCPCS | Performed by: FAMILY MEDICINE

## 2021-03-22 PROCEDURE — 2022F DILAT RTA XM EVC RTNOPTHY: CPT | Performed by: FAMILY MEDICINE

## 2021-03-22 PROCEDURE — G0463 HOSPITAL OUTPT CLINIC VISIT: HCPCS | Performed by: FAMILY MEDICINE

## 2021-03-22 PROCEDURE — 1101F PT FALLS ASSESS-DOCD LE1/YR: CPT | Performed by: FAMILY MEDICINE

## 2021-03-22 PROCEDURE — 3017F COLORECTAL CA SCREEN DOC REV: CPT | Performed by: FAMILY MEDICINE

## 2021-03-22 PROCEDURE — G8754 DIAS BP LESS 90: HCPCS | Performed by: FAMILY MEDICINE

## 2021-03-22 PROCEDURE — G8419 CALC BMI OUT NRM PARAM NOF/U: HCPCS | Performed by: FAMILY MEDICINE

## 2021-03-22 PROCEDURE — 99213 OFFICE O/P EST LOW 20 MIN: CPT | Performed by: FAMILY MEDICINE

## 2021-03-22 PROCEDURE — 83036 HEMOGLOBIN GLYCOSYLATED A1C: CPT | Performed by: FAMILY MEDICINE

## 2021-03-22 PROCEDURE — 3044F HG A1C LEVEL LT 7.0%: CPT | Performed by: FAMILY MEDICINE

## 2021-03-22 PROCEDURE — G8510 SCR DEP NEG, NO PLAN REQD: HCPCS | Performed by: FAMILY MEDICINE

## 2021-03-22 RX ORDER — PRAVASTATIN SODIUM 20 MG/1
20 TABLET ORAL
COMMUNITY
End: 2021-03-22

## 2021-03-22 NOTE — PROGRESS NOTES
Patient Name: Nicolette Issa   MRN: [id number]    Liyah Charles is a 69 y.o. male who presents with the following:     Reports 4 episodes persistent that have lasted about 1 to 2 days. Patient has a history of an acoustic neuroma that is being monitored by his neurosurgeon. He had a CT of the head done on 2/26 which showed stable size. He was then referred to Dr. Quang Arriaza (ENT) who determined that he had BPPV and recommended Epley maneuvers. Patient was wondering if any of his medication changes could possibly be contributing to his vertigo symptoms. Last visit, we had switch his Januvia to glipizide due to cost as well as switched his olmesartan to losartan. Denies any known episodes of hypoglycemia. BP Readings from Last 3 Encounters:   03/22/21 137/70   10/19/20 (!) 158/80   03/12/20 140/80     Lab Results   Component Value Date/Time    Hemoglobin A1c 7.0 (H) 03/16/2020 08:36 AM    Hemoglobin A1c (POC) 6.7 03/22/2021 01:46 PM     Review of Systems   Constitutional: Negative for fever, malaise/fatigue and weight loss. Respiratory: Negative for cough, hemoptysis, shortness of breath and wheezing. Cardiovascular: Negative for chest pain, palpitations, leg swelling and PND. Gastrointestinal: Negative for abdominal pain, constipation, diarrhea, nausea and vomiting. Neurological: Positive for dizziness. The patient's medications, allergies, past medical history, surgical history, family history and social history were reviewed and updated where appropriate. Prior to Admission medications    Medication Sig Start Date End Date Taking? Authorizing Provider   multivit-min/FA/lycopen/lutein (CENTRUM SILVER MEN PO) 1 Tab. Provider, Historical   pravastatin (PRAVACHOL) 20 mg tablet 20 mg.    Provider, Historical   SITagliptin (JANUVIA) 100 mg tablet 100 mg.     Provider, Historical   pravastatin (PRAVACHOL) 20 mg tablet TAKE 1 TABLET DAILY 2/2/21   Latonya Chahal MD   glucose blood VI test strips (OneTouch Ultra Test) strip Use to check blood sugar once daily. DM E11.9 1/15/21   Adam Foote MD   OneTouch Delica Plus Lancet 33 gauge misc TEST FASTING GLUCOSE DAILY DX E11.8 1/11/21   Adam Foote MD   glipiZIDE SR (GLUCOTROL XL) 5 mg CR tablet TAKE 1 TABLET BY MOUTH EVERY DAY 1/11/21   Adam Foote MD   losartan (COZAAR) 50 mg tablet Take 1 Tab by mouth daily. 11/19/20   Adam Foote MD   metFORMIN (GLUCOPHAGE) 500 mg tablet TAKE 2 TABLETS 2 TIMES     DAILY WITH MEALS 11/12/20   Adam Foote MD   SITagliptin (Januvia) 100 mg tablet Take 1 Tab by mouth daily. 10/15/20   Adam Foote MD   aspirin delayed-release 81 mg tablet Take 81 mg by mouth daily. Provider, Historical   multivitamin (ONE A DAY) tablet Take 1 Tab by mouth daily. Provider, Historical       Allergies   Allergen Reactions    Iodine Nausea Only     Shrimp         OBJECTIVE    Visit Vitals  /70 (BP 1 Location: Left upper arm, BP Patient Position: Sitting, BP Cuff Size: Adult)   Pulse 74   Temp 97 °F (36.1 °C) (Temporal)   Resp 18   Ht 5' 9\" (1.753 m)   Wt 189 lb (85.7 kg)   SpO2 96%   BMI 27.91 kg/m²       Physical Exam  Vitals signs and nursing note reviewed. Constitutional:       General: He is not in acute distress. Appearance: Normal appearance. He is not toxic-appearing. HENT:      Head: Normocephalic and atraumatic. Eyes:      Pupils: Pupils are equal, round, and reactive to light. Pulmonary:      Effort: Pulmonary effort is normal.   Musculoskeletal: Normal range of motion. Skin:     General: Skin is warm and dry. Neurological:      Mental Status: He is alert. Mental status is at baseline. Psychiatric:         Mood and Affect: Mood normal.         Behavior: Behavior normal.           ASSESSMENT AND PLAN  Lazarus Elias is a 69 y.o. male who presents today for:    1.  Controlled type 2 diabetes mellitus without complication, without long-term current use of insulin ([facility] [address]  Stable, continue current treatment. - AMB POC HEMOGLOBIN A1C    2. HTN, goal below 140/90  Stable, continue current treatment. 3. Benign paroxysmal positional vertigo, unspecified laterality  Continue with ENT recommendations for Epley maneuvers. Medications Discontinued During This Encounter   Medication Reason    SITagliptin (Januvia) 100 mg tablet LIST CLEANUP    pravastatin (PRAVACHOL) 20 mg tablet LIST CLEANUP    multivit-min/FA/lycopen/lutein (CENTRUM SILVER MEN PO) LIST CLEANUP    SITagliptin (JANUVIA) 100 mg tablet LIST CLEANUP       Follow-up and Dispositions    · Return in about 6 months (around 9/22/2021) for DM follow up. Treatment risks/benefits/costs/interactions/alternatives discussed with patient. Advised patient to call back or return to office if symptoms worsen/change/persist. If patient cannot reach us or should anything more severe/urgent arise he/she should proceed directly to the nearest emergency department. Discussed expected course/resolution/complications of diagnosis in detail with patient. Patient expressed understanding with the diagnosis and plan. Rom Reardon M.D.

## 2021-03-22 NOTE — PROGRESS NOTES
Chief Complaint   Patient presents with    Diabetes    Dizziness     1. Have you been to the ER, urgent care clinic since your last visit? Hospitalized since your last visit? No    2. Have you seen or consulted any other health care providers outside of the [address] [place] [address] since your last visit? Include any pap smears or colon screening.  No

## 2021-07-06 RX ORDER — GLIPIZIDE 5 MG/1
TABLET, FILM COATED, EXTENDED RELEASE ORAL
Qty: 90 TABLET | Refills: 1 | Status: SHIPPED | OUTPATIENT
Start: 2021-07-06 | End: 2021-10-20

## 2021-07-29 DIAGNOSIS — E11.9 CONTROLLED TYPE 2 DIABETES MELLITUS WITHOUT COMPLICATION, WITHOUT LONG-TERM CURRENT USE OF INSULIN (HCC): ICD-10-CM

## 2021-07-29 RX ORDER — METFORMIN HYDROCHLORIDE 500 MG/1
TABLET ORAL
Qty: 360 TABLET | Refills: 2 | Status: SHIPPED | OUTPATIENT
Start: 2021-07-29 | End: 2022-04-01

## 2021-09-23 ENCOUNTER — OFFICE VISIT (OUTPATIENT)
Dept: FAMILY MEDICINE CLINIC | Age: 71
End: 2021-09-23
Payer: MEDICARE

## 2021-09-23 ENCOUNTER — TELEPHONE (OUTPATIENT)
Dept: FAMILY MEDICINE CLINIC | Age: 71
End: 2021-09-23

## 2021-09-23 VITALS
SYSTOLIC BLOOD PRESSURE: 155 MMHG | HEIGHT: 69 IN | RESPIRATION RATE: 16 BRPM | BODY MASS INDEX: 27.76 KG/M2 | HEART RATE: 70 BPM | WEIGHT: 187.4 LBS | OXYGEN SATURATION: 98 % | TEMPERATURE: 98.2 F | DIASTOLIC BLOOD PRESSURE: 84 MMHG

## 2021-09-23 DIAGNOSIS — E78.5 HYPERLIPIDEMIA, UNSPECIFIED HYPERLIPIDEMIA TYPE: ICD-10-CM

## 2021-09-23 DIAGNOSIS — I10 HTN, GOAL BELOW 140/90: ICD-10-CM

## 2021-09-23 DIAGNOSIS — Z00.00 ENCOUNTER FOR MEDICARE ANNUAL WELLNESS EXAM: Primary | ICD-10-CM

## 2021-09-23 DIAGNOSIS — E11.9 CONTROLLED TYPE 2 DIABETES MELLITUS WITHOUT COMPLICATION, WITHOUT LONG-TERM CURRENT USE OF INSULIN (HCC): ICD-10-CM

## 2021-09-23 DIAGNOSIS — Z12.5 PROSTATE CANCER SCREENING: ICD-10-CM

## 2021-09-23 DIAGNOSIS — R41.3 MEMORY LOSS: ICD-10-CM

## 2021-09-23 DIAGNOSIS — Z23 NEEDS FLU SHOT: ICD-10-CM

## 2021-09-23 DIAGNOSIS — E55.9 VITAMIN D DEFICIENCY: ICD-10-CM

## 2021-09-23 LAB
25(OH)D3 SERPL-MCNC: 30.9 NG/ML (ref 30–100)
ALBUMIN SERPL-MCNC: 4.6 G/DL (ref 3.5–5)
ALBUMIN/GLOB SERPL: 1.4 {RATIO} (ref 1.1–2.2)
ALP SERPL-CCNC: 79 U/L (ref 45–117)
ALT SERPL-CCNC: 27 U/L (ref 12–78)
ANION GAP SERPL CALC-SCNC: 6 MMOL/L (ref 5–15)
AST SERPL-CCNC: 15 U/L (ref 15–37)
BILIRUB SERPL-MCNC: 0.6 MG/DL (ref 0.2–1)
BUN SERPL-MCNC: 15 MG/DL (ref 6–20)
BUN/CREAT SERPL: 16 (ref 12–20)
CALCIUM SERPL-MCNC: 10.1 MG/DL (ref 8.5–10.1)
CHLORIDE SERPL-SCNC: 104 MMOL/L (ref 97–108)
CHOLEST SERPL-MCNC: 145 MG/DL
CO2 SERPL-SCNC: 29 MMOL/L (ref 21–32)
COMMENT, HOLDF: NORMAL
CREAT SERPL-MCNC: 0.94 MG/DL (ref 0.7–1.3)
CREAT UR-MCNC: 186 MG/DL
ERYTHROCYTE [DISTWIDTH] IN BLOOD BY AUTOMATED COUNT: 12 % (ref 11.5–14.5)
EST. AVERAGE GLUCOSE BLD GHB EST-MCNC: 126 MG/DL
FOLATE SERPL-MCNC: 25.8 NG/ML (ref 5–21)
GLOBULIN SER CALC-MCNC: 3.2 G/DL (ref 2–4)
GLUCOSE SERPL-MCNC: 169 MG/DL (ref 65–100)
HBA1C MFR BLD: 6 % (ref 4–5.6)
HCT VFR BLD AUTO: 46.8 % (ref 36.6–50.3)
HDLC SERPL-MCNC: 51 MG/DL
HDLC SERPL: 2.8 {RATIO} (ref 0–5)
HGB BLD-MCNC: 15.6 G/DL (ref 12.1–17)
LDLC SERPL CALC-MCNC: 65 MG/DL (ref 0–100)
MCH RBC QN AUTO: 31.6 PG (ref 26–34)
MCHC RBC AUTO-ENTMCNC: 33.3 G/DL (ref 30–36.5)
MCV RBC AUTO: 94.7 FL (ref 80–99)
MICROALBUMIN UR-MCNC: 1.14 MG/DL
MICROALBUMIN/CREAT UR-RTO: 6 MG/G (ref 0–30)
NRBC # BLD: 0 K/UL (ref 0–0.01)
NRBC BLD-RTO: 0 PER 100 WBC
PLATELET # BLD AUTO: 213 K/UL (ref 150–400)
PMV BLD AUTO: 10.2 FL (ref 8.9–12.9)
POTASSIUM SERPL-SCNC: 4.4 MMOL/L (ref 3.5–5.1)
PROT SERPL-MCNC: 7.8 G/DL (ref 6.4–8.2)
PSA SERPL-MCNC: 1.1 NG/ML (ref 0.01–4)
RBC # BLD AUTO: 4.94 M/UL (ref 4.1–5.7)
SAMPLES BEING HELD,HOLD: NORMAL
SODIUM SERPL-SCNC: 139 MMOL/L (ref 136–145)
T4 SERPL-MCNC: 8.7 UG/DL (ref 4.5–12.1)
TRIGL SERPL-MCNC: 145 MG/DL (ref ?–150)
TSH SERPL DL<=0.05 MIU/L-ACNC: 1.69 UIU/ML (ref 0.36–3.74)
VIT B12 SERPL-MCNC: 319 PG/ML (ref 193–986)
VLDLC SERPL CALC-MCNC: 29 MG/DL
WBC # BLD AUTO: 7.2 K/UL (ref 4.1–11.1)

## 2021-09-23 PROCEDURE — 90694 VACC AIIV4 NO PRSRV 0.5ML IM: CPT | Performed by: FAMILY MEDICINE

## 2021-09-23 PROCEDURE — G8754 DIAS BP LESS 90: HCPCS | Performed by: FAMILY MEDICINE

## 2021-09-23 PROCEDURE — 99214 OFFICE O/P EST MOD 30 MIN: CPT | Performed by: FAMILY MEDICINE

## 2021-09-23 PROCEDURE — 2022F DILAT RTA XM EVC RTNOPTHY: CPT | Performed by: FAMILY MEDICINE

## 2021-09-23 PROCEDURE — G8753 SYS BP > OR = 140: HCPCS | Performed by: FAMILY MEDICINE

## 2021-09-23 PROCEDURE — G8510 SCR DEP NEG, NO PLAN REQD: HCPCS | Performed by: FAMILY MEDICINE

## 2021-09-23 PROCEDURE — G8419 CALC BMI OUT NRM PARAM NOF/U: HCPCS | Performed by: FAMILY MEDICINE

## 2021-09-23 PROCEDURE — 3017F COLORECTAL CA SCREEN DOC REV: CPT | Performed by: FAMILY MEDICINE

## 2021-09-23 PROCEDURE — 1101F PT FALLS ASSESS-DOCD LE1/YR: CPT | Performed by: FAMILY MEDICINE

## 2021-09-23 PROCEDURE — G0463 HOSPITAL OUTPT CLINIC VISIT: HCPCS | Performed by: FAMILY MEDICINE

## 2021-09-23 PROCEDURE — G8427 DOCREV CUR MEDS BY ELIG CLIN: HCPCS | Performed by: FAMILY MEDICINE

## 2021-09-23 PROCEDURE — 3044F HG A1C LEVEL LT 7.0%: CPT | Performed by: FAMILY MEDICINE

## 2021-09-23 PROCEDURE — G0439 PPPS, SUBSEQ VISIT: HCPCS | Performed by: FAMILY MEDICINE

## 2021-09-23 PROCEDURE — G8536 NO DOC ELDER MAL SCRN: HCPCS | Performed by: FAMILY MEDICINE

## 2021-09-23 RX ORDER — LOSARTAN POTASSIUM 100 MG/1
100 TABLET ORAL DAILY
Qty: 90 TABLET | Refills: 2 | Status: SHIPPED | OUTPATIENT
Start: 2021-09-23 | End: 2022-06-07

## 2021-09-23 NOTE — PROGRESS NOTES
This is the Subsequent Medicare Annual Wellness Exam, performed 12 months or more after the Initial AWV or the last Subsequent AWV    I have reviewed the patient's medical history in detail and updated the computerized patient record. Assessment/Plan   Education and counseling provided:  Are appropriate based on today's review and evaluation    1. Encounter for Medicare annual wellness exam  2. Controlled type 2 diabetes mellitus without complication, without long-term current use of insulin (HCC)  -     HEMOGLOBIN A1C WITH EAG; Future  -     MICROALBUMIN, UR, RAND W/ MICROALB/CREAT RATIO; Future  3. HTN, goal below 140/90  -     losartan (COZAAR) 100 mg tablet; Take 1 Tablet by mouth daily. Dose change, Normal, Disp-90 Tablet, R-2  4. Hyperlipidemia, unspecified hyperlipidemia type  -     CBC W/O DIFF; Future  -     METABOLIC PANEL, COMPREHENSIVE; Future  -     LIPID PANEL; Future  5. Prostate cancer screening  -     PSA, DIAGNOSTIC (PROSTATE SPECIFIC AG); Future  6. Memory loss  -     TSH 3RD GENERATION; Future  -     T4 (THYROXINE); Future  -     VITAMIN B12 & FOLATE; Future  7. Vitamin D deficiency  -     VITAMIN D, 25 HYDROXY; Future       Depression Risk Factor Screening     3 most recent PHQ Screens 9/23/2021   Little interest or pleasure in doing things Not at all   Feeling down, depressed, irritable, or hopeless Not at all   Total Score PHQ 2 0       Alcohol Risk Screen    Do you average more than 1 drink per night or more than 7 drinks a week: No    In the past three months have you have had more than 4 drinks containing alcohol on one occasion: No        Functional Ability and Level of Safety    Hearing: The patient wears hearing aids. Activities of Daily Living: The home contains: no safety equipment. Patient does total self care      Ambulation: with no difficulty     Fall Risk:  Fall Risk Assessment, last 12 mths 9/23/2021   Able to walk?  Yes   Fall in past 12 months? 0   Do you feel unsteady? 0   Are you worried about falling 0      Abuse Screen:  Patient is not abused       Cognitive Screening    Has your family/caregiver stated any concerns about your memory: wife reported concern; pt does not have any concern. Health Maintenance Due     Health Maintenance Due   Topic Date Due    Shingrix Vaccine Age 49> (1 of 2) Never done    Colorectal Cancer Screening Combo  01/13/2017    Foot Exam Q1  01/12/2018    Eye Exam Retinal or Dilated  11/02/2020    MICROALBUMIN Q1  03/16/2021    Lipid Screen  03/16/2021    Flu Vaccine (1) 09/01/2021       Patient Care Team   Patient Care Team:  Laney Anguiano MD as PCP - General (Family Medicine)  Laney Anguiano MD as PCP - [address] [place] [address] Provider  Keely Ramirez MD as Physician (Otolaryngology)  Anabel Rosario MD (Gastroenterology)    History     Patient Active Problem List   Diagnosis Code    Diabetes mellitus type 2, controlled, without complications ([address] L20.3    H/O blastomycosis Z86.19    Hyperlipidemia with target LDL less than 100 E78.5    Acoustic neuroma ([address] D33.3    HTN, goal below 140/90 I10     Past Medical History:   Diagnosis Date    Acoustic neuroma ([address] 05/28/2018    left ear    Blastomycosis 1990    (treated with Sporanox) -Left Lung    Diabetes mellitus type 2, controlled, without complications ([address] 11/50/9058    History of colonic polyps     HTN, goal below 140/90 6/11/2019      Past Surgical History:   Procedure Laterality Date    HX ANKLE FRACTURE TX Right 01/2018    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  Dose change 90 Tablet 2    metFORMIN (GLUCOPHAGE) 500 mg tablet TAKE 2 TABLETS 2 TIMES     DAILY WITH MEALS 360 Tablet 2    glipiZIDE SR (GLUCOTROL XL) 5 mg CR tablet TAKE 1 TABLET BY MOUTH EVERY DAY 90 Tablet 1    pravastatin (PRAVACHOL) 20 mg tablet TAKE 1 TABLET DAILY 90 Tab 2    glucose blood VI test strips (OneTouch Ultra Test) strip Use to check blood sugar once daily. DM E11.9 100 Strip 3    OneTouch Delica Plus Lancet 33 gauge misc TEST FASTING GLUCOSE DAILY DX E11.8 100 Lancet 2    aspirin delayed-release 81 mg tablet Take 81 mg by mouth daily.  multivitamin (ONE A DAY) tablet Take 1 Tab by mouth daily. Allergies   Allergen Reactions    Iodine Nausea Only     Shrimp       Family History   Problem Relation Age of Onset    Cancer Father         prostate    Diabetes Father         adult-onset    Stroke Father     Dementia Mother     Psychiatric Disorder Mother     Diabetes Mother      Social History     Tobacco Use    Smoking status: Never Smoker    Smokeless tobacco: Never Used   Substance Use Topics    Alcohol use:  Yes     Alcohol/week: 0.0 standard drinks     Comment: rare to occasional         Kadie Chiu MD

## 2021-09-23 NOTE — TELEPHONE ENCOUNTER
----- Message from Keon Herrera MD sent at 9/23/2021  7:57 AM EDT -----  Can you get last colonoscopy from GI?

## 2021-09-23 NOTE — PROGRESS NOTES
Chief Complaint   Patient presents with   [facility] Annual Wellness Visit       1. Have you been to the ER, urgent care clinic since your last visit? Hospitalized since your last visit? No    2. Have you seen or consulted any other health care providers outside of the [address] [place] [address] since your last visit? Include any pap smears or colon screening. Yes When: 07/21 [facility]: [facility] Reason for visit: hearing aids  due to hearing loss because of acoustic neuroma. Annual [facility] appointment 09/21 for biopsy Dr. Day Feliz    3. For patients over 45: Has the patient had a colonoscopy? Yes, HM satisfied with blue hyperlink     If the patient is female:    4. For patients over 40: Has the patient had a mammogram? N/A   5. For patients over 21: Has the patient had a pap smear? N/A     3 most recent PHQ Screens 9/23/2021   Little interest or pleasure in doing things Not at all   Feeling down, depressed, irritable, or hopeless Not at all   Total Score PHQ 2 0       Abuse Screening Questionnaire 9/23/2021   Do you ever feel afraid of your partner? N   Are you in a relationship with someone who physically or mentally threatens you? N   Is it safe for you to go home?  Usha Arboleda

## 2021-09-23 NOTE — PROGRESS NOTES
Patient Name: Pat Amezquita   MRN: [id number]    Brice Panchal is a 69 y.o. male who presents with the following:     Recently got hearing aids which help a little bit. His wife thinks he may be losing his memory and she often has to repeat things to him. He otherwise is doing well. States that his home blood pressure readings are usually normal.  Might have a little bit of whitecoat hypertension. History of vitamin D deficiency. Had a colonoscopy in the past few years; was told to repeat in 5 years. BP Readings from Last 3 Encounters:   09/23/21 (!) 155/84   03/22/21 137/70   10/19/20 (!) 158/80     Wt Readings from Last 3 Encounters:   09/23/21 187 lb 6.4 oz (85 kg)   03/22/21 189 lb (85.7 kg)   10/19/20 184 lb (83.5 kg)     Lab Results   Component Value Date/Time    Hemoglobin A1c 7.0 (H) 03/16/2020 08:36 AM    Hemoglobin A1c (POC) 6.7 03/22/2021 01:46 PM     Review of Systems   Constitutional: Negative for fever, malaise/fatigue and weight loss. HENT: Positive for hearing loss. Respiratory: Negative for cough, hemoptysis, shortness of breath and wheezing. Cardiovascular: Negative for chest pain, palpitations, leg swelling and PND. Gastrointestinal: Negative for abdominal pain, constipation, diarrhea, nausea and vomiting. Psychiatric/Behavioral: Positive for memory loss. The patient's medications, allergies, past medical history, surgical history, family history and social history were reviewed and updated where appropriate. Current Outpatient Medications:     metFORMIN (GLUCOPHAGE) 500 mg tablet, TAKE 2 TABLETS 2 TIMES     DAILY WITH MEALS, Disp: 360 Tablet, Rfl: 2    losartan (COZAAR) 50 mg tablet, Take 1 Tablet by mouth daily. , Disp: 90 Tablet, Rfl: 2    glipiZIDE SR (GLUCOTROL XL) 5 mg CR tablet, TAKE 1 TABLET BY MOUTH EVERY DAY, Disp: 90 Tablet, Rfl: 1    pravastatin (PRAVACHOL) 20 mg tablet, TAKE 1 TABLET DAILY, Disp: 90 Tab, Rfl: 2    glucose blood VI test strips (OneTouch Ultra Test) strip, Use to check blood sugar once daily. DM E11.9, Disp: 100 Strip, Rfl: 3    OneTouch Delica Plus Lancet 33 gauge misc, TEST FASTING GLUCOSE DAILY DX E11.8, Disp: 100 Lancet, Rfl: 2    aspirin delayed-release 81 mg tablet, Take 81 mg by mouth daily. , Disp: , Rfl:     multivitamin (ONE A DAY) tablet, Take 1 Tab by mouth daily. , Disp: , Rfl:     Allergies   Allergen Reactions    Iodine Nausea Only     Shrimp         OBJECTIVE    Visit Vitals  BP (!) 155/84 (BP 1 Location: Left upper arm, BP Patient Position: Sitting, BP Cuff Size: Adult)   Pulse 70   Temp 98.2 °F (36.8 °C) (Temporal)   Resp 16   Ht 5' 9\" (1.753 m)   Wt 187 lb 6.4 oz (85 kg)   SpO2 98%   BMI 27.67 kg/m²       Physical Exam  Vitals and nursing note reviewed. Constitutional:       General: He is not in acute distress. Appearance: He is not diaphoretic. Eyes:      Conjunctiva/sclera: Conjunctivae normal.      Pupils: Pupils are equal, round, and reactive to light. Cardiovascular:      Rate and Rhythm: Normal rate and regular rhythm. Heart sounds: Normal heart sounds. No murmur heard. No friction rub. No gallop. Pulmonary:      Effort: Pulmonary effort is normal. No respiratory distress. Breath sounds: Normal breath sounds. No wheezing. Skin:     General: Skin is warm and dry. Neurological:      Mental Status: He is alert. ASSESSMENT AND PLAN  Dylan Ortiz is a 69 y.o. male who presents today for:    1. Encounter for Medicare annual wellness exam    2. Controlled type 2 diabetes mellitus without complication, without long-term current use of insulin (HCC)  - HEMOGLOBIN A1C WITH EAG; Future  - MICROALBUMIN, UR, RAND W/ MICROALB/CREAT RATIO; Future  - MICROALBUMIN, UR, RAND W/ MICROALB/CREAT RATIO  - HEMOGLOBIN A1C WITH EAG    3. HTN, goal below 140/90  Increase to 100 mg. Pt to monitor at home. - losartan (COZAAR) 100 mg tablet; Take 1 Tablet by mouth daily.  Dose change  Dispense: 90 Tablet; Refill: 2    4. Hyperlipidemia, unspecified hyperlipidemia type  Will calculate ASCVD risk score pending labs. - CBC W/O DIFF; Future  - METABOLIC PANEL, COMPREHENSIVE; Future  - LIPID PANEL; Future  - LIPID PANEL  - METABOLIC PANEL, COMPREHENSIVE  - CBC W/O DIFF    5. Prostate cancer screening  Regarding PSA-based prostate cancer screening, I discussed with patient the following:   Prostate cancer screening with PSA carries a \"D\" recommendation from the USPSTF due to small or absent evidence of benefit and clinically important harms due to overdiagnosis and overtreatment. The American Cancer Society (ACS) recommends that men have a chance to make an informed decision with their health care provider about whether to be screened for prostate cancer. After a discussion of uncertainties, risks, and potential benefits of prostate cancer screening, patient today would like to obtain PSA.  - PSA, DIAGNOSTIC (PROSTATE SPECIFIC AG); Future  - PSA, DIAGNOSTIC (PROSTATE SPECIFIC AG)    6. Memory loss  Possibly apparent due to hearing loss. Will check labs. If worse, consider neuropsych testing.  - TSH 3RD GENERATION; Future  - T4 (THYROXINE); Future  - VITAMIN B12 & FOLATE; Future  - VITAMIN B12 & FOLATE  - T4 (THYROXINE)  - TSH 3RD GENERATION    7. Vitamin D deficiency  - VITAMIN D, 25 HYDROXY; Future  - VITAMIN D, 25 HYDROXY      Medications Discontinued During This Encounter   Medication Reason    losartan (COZAAR) 50 mg tablet REORDER           Treatment risks/benefits/costs/interactions/alternatives discussed with patient. Advised patient to call back or return to office if symptoms worsen/change/persist. If patient cannot reach us or should anything more severe/urgent arise he/she should proceed directly to the nearest emergency department. Discussed expected course/resolution/complications of diagnosis in detail with patient. Patient expressed understanding with the diagnosis and plan.      Rom AMIN Lit Valentino M.D.

## 2021-09-24 NOTE — PROGRESS NOTES
Dear Mr. Tyrell Dietrich,    I wanted to follow up on your recent test results:    A1c is well controlled at 6.0. Other labs including thyroid, cholesterol, and prostate marker are all normal.  Please follow up in 6 months.

## 2022-02-08 RX ORDER — BLOOD SUGAR DIAGNOSTIC
STRIP MISCELLANEOUS
Qty: 100 STRIP | Refills: 0 | OUTPATIENT
Start: 2022-02-08

## 2022-02-08 NOTE — TELEPHONE ENCOUNTER
NP [place],    [facility] needs ICD code for the test strips sent yesterday. Added E11.9 if appropriate. Please resend. Aura Villaseñor    Previous Refill Encounter(s): 2/7/22 100    Requested Prescriptions     Pending Prescriptions Disp Refills    glucose blood VI test strips (OneTouch Ultra Test) strip 100 Strip 0     Sig: USE TO CHECK BLOOD SUGAR ONCE DAILY.   Dx: E11.9

## 2022-02-23 RX ORDER — BLOOD SUGAR DIAGNOSTIC
STRIP MISCELLANEOUS
Qty: 100 STRIP | Refills: 0 | Status: SHIPPED | OUTPATIENT
Start: 2022-02-23 | End: 2022-05-23

## 2022-02-23 NOTE — TELEPHONE ENCOUNTER
Pharmacy/insurance requires Code for test strips. Added E11.9 if appropriate. Please resend for patient. ThanksAura    Previous Refill Encounter(s): 2/7/22 100 (unable to process w/out code.)    Requested Prescriptions     Pending Prescriptions Disp Refills    glucose blood VI test strips (OneTouch Ultra Test) strip 100 Strip 0     Sig: USE TO CHECK BLOOD SUGAR ONCE DAILY.   Dx: E11.9

## 2022-03-18 PROBLEM — I10 HTN, GOAL BELOW 140/90: Status: ACTIVE | Noted: 2019-06-11

## 2022-03-18 PROBLEM — D33.3 ACOUSTIC NEUROMA (HCC): Status: ACTIVE | Noted: 2018-05-28

## 2022-04-15 ENCOUNTER — OFFICE VISIT (OUTPATIENT)
Dept: FAMILY MEDICINE CLINIC | Age: 72
End: 2022-04-15
Payer: MEDICARE

## 2022-04-15 VITALS
OXYGEN SATURATION: 98 % | DIASTOLIC BLOOD PRESSURE: 74 MMHG | SYSTOLIC BLOOD PRESSURE: 128 MMHG | HEART RATE: 67 BPM | WEIGHT: 189.6 LBS | BODY MASS INDEX: 28.08 KG/M2 | TEMPERATURE: 98.2 F | HEIGHT: 69 IN | RESPIRATION RATE: 16 BRPM

## 2022-04-15 DIAGNOSIS — Z71.85 VACCINE COUNSELING: Primary | ICD-10-CM

## 2022-04-15 DIAGNOSIS — D33.3 ACOUSTIC NEUROMA (HCC): ICD-10-CM

## 2022-04-15 DIAGNOSIS — E11.9 CONTROLLED TYPE 2 DIABETES MELLITUS WITHOUT COMPLICATION, WITHOUT LONG-TERM CURRENT USE OF INSULIN (HCC): ICD-10-CM

## 2022-04-15 LAB — HBA1C MFR BLD HPLC: 6.8 %

## 2022-04-15 PROCEDURE — G8752 SYS BP LESS 140: HCPCS | Performed by: FAMILY MEDICINE

## 2022-04-15 PROCEDURE — G8427 DOCREV CUR MEDS BY ELIG CLIN: HCPCS | Performed by: FAMILY MEDICINE

## 2022-04-15 PROCEDURE — 3017F COLORECTAL CA SCREEN DOC REV: CPT | Performed by: FAMILY MEDICINE

## 2022-04-15 PROCEDURE — G8510 SCR DEP NEG, NO PLAN REQD: HCPCS | Performed by: FAMILY MEDICINE

## 2022-04-15 PROCEDURE — G8536 NO DOC ELDER MAL SCRN: HCPCS | Performed by: FAMILY MEDICINE

## 2022-04-15 PROCEDURE — 83036 HEMOGLOBIN GLYCOSYLATED A1C: CPT | Performed by: FAMILY MEDICINE

## 2022-04-15 PROCEDURE — 1101F PT FALLS ASSESS-DOCD LE1/YR: CPT | Performed by: FAMILY MEDICINE

## 2022-04-15 PROCEDURE — 3044F HG A1C LEVEL LT 7.0%: CPT | Performed by: FAMILY MEDICINE

## 2022-04-15 PROCEDURE — G8754 DIAS BP LESS 90: HCPCS | Performed by: FAMILY MEDICINE

## 2022-04-15 PROCEDURE — 2022F DILAT RTA XM EVC RTNOPTHY: CPT | Performed by: FAMILY MEDICINE

## 2022-04-15 PROCEDURE — G8419 CALC BMI OUT NRM PARAM NOF/U: HCPCS | Performed by: FAMILY MEDICINE

## 2022-04-15 PROCEDURE — 99214 OFFICE O/P EST MOD 30 MIN: CPT | Performed by: FAMILY MEDICINE

## 2022-04-15 PROCEDURE — G0463 HOSPITAL OUTPT CLINIC VISIT: HCPCS | Performed by: FAMILY MEDICINE

## 2022-04-15 NOTE — PROGRESS NOTES
Chief Complaint   Patient presents with    Medication Evaluation    Diabetes    Immunization/Injection     would like to discuss getting second booster, just recieved first dose of shingles vaccine one month ago - also has questions about travel        1. \"Have you been to the ER, urgent care clinic since your last visit? Hospitalized since your last visit? \" No    2. \"Have you seen or consulted any other health care providers outside of the [address] [place] [address] since your last visit? \" Yes When: 2022 Where: derm and ortho surgeon, in [facility] Reason for visit: routine and ankle surgery      3. For patients aged 39-70: Has the patient had a colonoscopy / FIT/ Cologuard? Yes - Care Gap present. Rooming MA/LPN to request most recent results Dr. Niurka Fenton       If the patient is female:    4. For patients aged 41-77: Has the patient had a mammogram within the past 2 years? NA - based on age or sex      11. For patients aged 21-65: Has the patient had a pap smear?  NA - based on age or sex    3 most recent PHQ Screens 4/15/2022   Little interest or pleasure in doing things Not at all   Feeling down, depressed, irritable, or hopeless Not at all   Total Score PHQ 2 0

## 2022-04-15 NOTE — PROGRESS NOTES
Patient Name: Richy Nuñez   MRN: [id number]    Peter Michaels is a 66 y.o. male who presents with the following:     BGs in the 150's. Has added a little more wine with dinner. Had a colonoscopy a year ago; had a fwe polyps; told to repeat in 5 years. Will be going to a trip to [facility] in May. Wanted to know how to time his second Shingrix and 2nd COVID booster. BP Readings from Last 3 Encounters:   04/15/22 128/74   09/23/21 (!) 155/84   03/22/21 137/70     Lab Results   Component Value Date/Time    Hemoglobin A1c 6.0 (H) 09/23/2021 09:00 AM    Hemoglobin A1c (POC) 6.7 03/22/2021 01:46 PM     Wt Readings from Last 3 Encounters:   04/15/22 189 lb 9.6 oz (86 kg)   09/23/21 187 lb 6.4 oz (85 kg)   03/22/21 189 lb (85.7 kg)       Review of Systems   Constitutional: Negative for fever, malaise/fatigue and weight loss. Respiratory: Negative for cough, hemoptysis, shortness of breath and wheezing. Cardiovascular: Negative for chest pain, palpitations, leg swelling and PND. Gastrointestinal: Negative for abdominal pain, constipation, diarrhea, nausea and vomiting. The patient's medications, allergies, past medical history, surgical history, family history and social history were reviewed and updated where appropriate. Current Outpatient Medications:     metFORMIN (GLUCOPHAGE) 500 mg tablet, TAKE 2 TABLETS 2 TIMES     DAILY WITH MEALS, Disp: 360 Tablet, Rfl: 0    glucose blood VI test strips (OneTouch Ultra Test) strip, USE TO CHECK BLOOD SUGAR ONCE DAILY. Dx: E11.9, Disp: 100 Strip, Rfl: 0    OneTouch Delica Plus Lancet 33 gauge misc, TEST FASTING GLUCOSE DAILY DX E11.8, Disp: 100 Each, Rfl: 2    pravastatin (PRAVACHOL) 20 mg tablet, TAKE 1 TABLET DAILY, Disp: 90 Tablet, Rfl: 3    glipiZIDE SR (GLUCOTROL XL) 5 mg CR tablet, TAKE 1 TABLET DAILY, Disp: 90 Tablet, Rfl: 3    losartan (COZAAR) 100 mg tablet, Take 1 Tablet by mouth daily.  Dose change, Disp: 90 Tablet, Rfl: 2    aspirin delayed-release 81 mg tablet, Take 81 mg by mouth daily. , Disp: , Rfl:     multivitamin (ONE A DAY) tablet, Take 1 Tab by mouth daily. , Disp: , Rfl:     Allergies   Allergen Reactions    Iodine Nausea Only     Shrimp       OBJECTIVE    Visit Vitals  /74 (BP 1 Location: Left upper arm, BP Patient Position: Sitting, BP Cuff Size: Adult)   Pulse 67   Temp 98.2 °F (36.8 °C) (Temporal)   Resp 16   Ht 5' 9\" (1.753 m)   Wt 189 lb 9.6 oz (86 kg)   SpO2 98%   BMI 28.00 kg/m²       Physical Exam  Vitals and nursing note reviewed. Constitutional:       General: He is not in acute distress. Appearance: He is not diaphoretic. Eyes:      Conjunctiva/sclera: Conjunctivae normal.      Pupils: Pupils are equal, round, and reactive to light. Cardiovascular:      Rate and Rhythm: Normal rate and regular rhythm. Heart sounds: Normal heart sounds. No murmur heard. No friction rub. No gallop. Pulmonary:      Effort: Pulmonary effort is normal. No respiratory distress. Breath sounds: Normal breath sounds. No wheezing. Skin:     General: Skin is warm and dry. Neurological:      Mental Status: He is alert. ASSESSMENT AND PLAN  Radha Connell is a 66 y.o. male who presents today for:    1. Controlled type 2 diabetes mellitus without complication, without long-term current use of insulin (HCC)  Stable, continue current treatment. Pt to continue to monitor sugars; no med changes. - AMB POC HEMOGLOBIN A1C    2. Acoustic neuroma (HCC)  Stable, continue current treatment. 3. Vaccine counseling  Pt can get 2nd COVID booster now and wait for 2nd Shingrix 4 months after his first Shingrix dose. There are no discontinued medications. Follow-up and Dispositions    · Return in about 3 months (around 7/15/2022) for DM follow up. Treatment risks/benefits/costs/interactions/alternatives discussed with patient.   Advised patient to call back or return to office if symptoms worsen/change/persist. If patient cannot reach us or should anything more severe/urgent arise he/she should proceed directly to the nearest emergency department. Discussed expected course/resolution/complications of diagnosis in detail with patient. Patient expressed understanding with the diagnosis and plan. This dictation may have been completed with Dragon, the computer voice recognition software. Unanticipated grammatical, syntax, homophones, and other interpretive errors are sometimes inadvertently transcribed by the computer software. Please disregard any errors that have escaped final proofreading. Rom Pierson M.D.

## 2022-05-10 ENCOUNTER — VIRTUAL VISIT (OUTPATIENT)
Dept: FAMILY MEDICINE CLINIC | Age: 72
End: 2022-05-10
Payer: MEDICARE

## 2022-05-10 DIAGNOSIS — U07.1 COVID-19 VIRUS INFECTION: Primary | ICD-10-CM

## 2022-05-10 PROCEDURE — G8427 DOCREV CUR MEDS BY ELIG CLIN: HCPCS | Performed by: FAMILY MEDICINE

## 2022-05-10 PROCEDURE — G8756 NO BP MEASURE DOC: HCPCS | Performed by: FAMILY MEDICINE

## 2022-05-10 PROCEDURE — 1101F PT FALLS ASSESS-DOCD LE1/YR: CPT | Performed by: FAMILY MEDICINE

## 2022-05-10 PROCEDURE — G0463 HOSPITAL OUTPT CLINIC VISIT: HCPCS | Performed by: FAMILY MEDICINE

## 2022-05-10 PROCEDURE — G8432 DEP SCR NOT DOC, RNG: HCPCS | Performed by: FAMILY MEDICINE

## 2022-05-10 PROCEDURE — 99213 OFFICE O/P EST LOW 20 MIN: CPT | Performed by: FAMILY MEDICINE

## 2022-05-10 PROCEDURE — 3017F COLORECTAL CA SCREEN DOC REV: CPT | Performed by: FAMILY MEDICINE

## 2022-05-10 NOTE — LETTER
5/10/2022 1:19 PM    Mr. Kelsey Kuhn [id number]      To Whom It May Concern:    Ninfa Elam is currently under the care of CLARISSA Yang. Patient developed symptoms of COVID infection on 4/25/2022 with subsequent positive COVID test results. He has now recovered from [facility] and is beyond the recommended 10 day isolation period per CDC guidelines. He is not considered at risk for transmitting the virus to others for his upcoming trip on 5/31/2022. If there are questions or concerns please have the patient contact our office.         Sincerely,      Gisele Rivera MD

## 2022-05-10 NOTE — PROGRESS NOTES
Derek Hopson, was evaluated through a synchronous (real-time) audio-video encounter. The patient (or guardian if applicable) is aware that this is a billable service, which includes applicable co-pays. This Virtual Visit was conducted with patient's (and/or legal guardian's) consent. The visit was conducted pursuant to the emergency declaration under the [facility] [address] [address] authority and the [employer] General Act. Patient identification was verified, and a caregiver was present when appropriate. The patient was located in a state where the provider was licensed to provide care. Peggy Leiva MD    [phone]  Subjective:   Derek Hopson is a 66 y.o. M who was seen for:    Patient's son-in-law tested positive for COVID and pt was exposed to him prior to the diagnosis. Patient himself developed URI symptoms (fatigue, body aches) starting on April 25. He recovered well without needing additional treatment. He is going on a  cruise on May 31 and needs a letter for recovery. Current Outpatient Medications:     metFORMIN (GLUCOPHAGE) 500 mg tablet, TAKE 2 TABLETS 2 TIMES     DAILY WITH MEALS, Disp: 360 Tablet, Rfl: 0    glucose blood VI test strips (OneTouch Ultra Test) strip, USE TO CHECK BLOOD SUGAR ONCE DAILY. Dx: E11.9, Disp: 100 Strip, Rfl: 0    OneTouch Delica Plus Lancet 33 gauge misc, TEST FASTING GLUCOSE DAILY DX E11.8, Disp: 100 Each, Rfl: 2    pravastatin (PRAVACHOL) 20 mg tablet, TAKE 1 TABLET DAILY, Disp: 90 Tablet, Rfl: 3    glipiZIDE SR (GLUCOTROL XL) 5 mg CR tablet, TAKE 1 TABLET DAILY, Disp: 90 Tablet, Rfl: 3    losartan (COZAAR) 100 mg tablet, Take 1 Tablet by mouth daily. Dose change, Disp: 90 Tablet, Rfl: 2    aspirin delayed-release 81 mg tablet, Take 81 mg by mouth daily. , Disp: , Rfl:     multivitamin (ONE A DAY) tablet, Take 1 Tab by mouth daily. , Disp: , Rfl:     Allergies   Allergen Reactions    Iodine Nausea Only     Shrimp       Review of Systems   Constitutional: Negative for fever, malaise/fatigue and weight loss. Respiratory: Negative for cough, hemoptysis, shortness of breath and wheezing. Cardiovascular: Negative for chest pain, palpitations, leg swelling and PND. Gastrointestinal: Negative for abdominal pain, constipation, diarrhea, nausea and vomiting. Objective:     Patient-Reported Vitals 5/10/2022   Patient-Reported Weight 183   Patient-Reported Height 5'10\"   Patient-Reported Pulse 63   Patient-Reported Temperature 98.7   Patient-Reported Systolic  360   Patient-Reported Diastolic 76        Constitutional: [x] Appears well-developed and well-nourished [x] No apparent distress      [] Abnormal -     Mental status: [x] Alert and awake  [x] Oriented to person/place/time [x] Able to follow commands    [] Abnormal -     Eyes:   EOM    [x]  Normal    [] Abnormal -   Sclera  [x]  Normal    [] Abnormal -          Discharge []  None visible   [] Abnormal -     HENT: [x] Normocephalic, atraumatic  [] Abnormal -   [] Mouth/Throat: Mucous membranes are moist    Neck: [x] No visualized mass [] Abnormal -     Pulmonary/Chest: [x] Respiratory effort normal   [x] No visualized signs of difficulty breathing or respiratory distress        [] Abnormal -         Skin:        [x] No significant exanthematous lesions or discoloration noted on facial skin         [] Abnormal -            Psychiatric:       [x] Normal Affect [] Abnormal -        [x] No Hallucinations    Other pertinent observable physical exam findings:    Assessment & Plan:   Frandy Michael is a 66 y.o. male who presents today for:    1. COVID-19 virus infection  Clinically improved. Considered recovered and not at risk of transmitting virus to others at this point. Will provide letter. There are no discontinued medications. Treatment risks/benefits/costs/interactions/alternatives discussed with patient.   Advised patient to call back or return to office if symptoms worsen/change/persist. If patient cannot reach us or should anything more severe/urgent arise he/she should proceed directly to the nearest emergency department. Discussed expected course/resolution/complications of diagnosis in detail with patient. Patient expressed understanding with the diagnosis and plan. This dictation may have been completed with Dragon, the computer voice recognition software. Unanticipated grammatical, syntax, homophones, and other interpretive errors are sometimes inadvertently transcribed by the computer software. Please disregard any errors that have escaped final proofreading. Rom Arora M.D.

## 2022-07-26 ENCOUNTER — OFFICE VISIT (OUTPATIENT)
Dept: FAMILY MEDICINE CLINIC | Age: 72
End: 2022-07-26
Payer: MEDICARE

## 2022-07-26 VITALS
SYSTOLIC BLOOD PRESSURE: 158 MMHG | OXYGEN SATURATION: 98 % | WEIGHT: 186.8 LBS | BODY MASS INDEX: 27.67 KG/M2 | DIASTOLIC BLOOD PRESSURE: 84 MMHG | RESPIRATION RATE: 16 BRPM | HEIGHT: 69 IN | HEART RATE: 70 BPM | TEMPERATURE: 97.7 F

## 2022-07-26 DIAGNOSIS — E11.9 CONTROLLED TYPE 2 DIABETES MELLITUS WITHOUT COMPLICATION, WITHOUT LONG-TERM CURRENT USE OF INSULIN (HCC): Primary | ICD-10-CM

## 2022-07-26 DIAGNOSIS — I10 HTN, GOAL BELOW 140/90: ICD-10-CM

## 2022-07-26 DIAGNOSIS — E78.5 HYPERLIPIDEMIA, UNSPECIFIED HYPERLIPIDEMIA TYPE: ICD-10-CM

## 2022-07-26 DIAGNOSIS — E55.9 VITAMIN D DEFICIENCY: ICD-10-CM

## 2022-07-26 DIAGNOSIS — Z12.5 PROSTATE CANCER SCREENING: ICD-10-CM

## 2022-07-26 LAB
25(OH)D3 SERPL-MCNC: 39.6 NG/ML (ref 30–100)
ALBUMIN SERPL-MCNC: 4.2 G/DL (ref 3.5–5)
ALBUMIN/GLOB SERPL: 1.4 {RATIO} (ref 1.1–2.2)
ALP SERPL-CCNC: 74 U/L (ref 45–117)
ALT SERPL-CCNC: 21 U/L (ref 12–78)
ANION GAP SERPL CALC-SCNC: 8 MMOL/L (ref 5–15)
AST SERPL-CCNC: 17 U/L (ref 15–37)
BILIRUB SERPL-MCNC: 0.5 MG/DL (ref 0.2–1)
BUN SERPL-MCNC: 17 MG/DL (ref 6–20)
BUN/CREAT SERPL: 19 (ref 12–20)
CALCIUM SERPL-MCNC: 9.9 MG/DL (ref 8.5–10.1)
CHLORIDE SERPL-SCNC: 104 MMOL/L (ref 97–108)
CHOLEST SERPL-MCNC: 135 MG/DL
CO2 SERPL-SCNC: 26 MMOL/L (ref 21–32)
CREAT SERPL-MCNC: 0.88 MG/DL (ref 0.7–1.3)
CREAT UR-MCNC: 87.7 MG/DL
ERYTHROCYTE [DISTWIDTH] IN BLOOD BY AUTOMATED COUNT: 12.4 % (ref 11.5–14.5)
EST. AVERAGE GLUCOSE BLD GHB EST-MCNC: 148 MG/DL
GLOBULIN SER CALC-MCNC: 3 G/DL (ref 2–4)
GLUCOSE SERPL-MCNC: 184 MG/DL (ref 65–100)
HBA1C MFR BLD: 6.8 % (ref 4–5.6)
HCT VFR BLD AUTO: 44 % (ref 36.6–50.3)
HDLC SERPL-MCNC: 51 MG/DL
HDLC SERPL: 2.6 {RATIO} (ref 0–5)
HGB BLD-MCNC: 14.9 G/DL (ref 12.1–17)
LDLC SERPL CALC-MCNC: 62.2 MG/DL (ref 0–100)
MCH RBC QN AUTO: 32.2 PG (ref 26–34)
MCHC RBC AUTO-ENTMCNC: 33.9 G/DL (ref 30–36.5)
MCV RBC AUTO: 95 FL (ref 80–99)
MICROALBUMIN UR-MCNC: 0.56 MG/DL
MICROALBUMIN/CREAT UR-RTO: 6 MG/G (ref 0–30)
NRBC # BLD: 0 K/UL (ref 0–0.01)
NRBC BLD-RTO: 0 PER 100 WBC
PLATELET # BLD AUTO: 216 K/UL (ref 150–400)
PMV BLD AUTO: 10.4 FL (ref 8.9–12.9)
POTASSIUM SERPL-SCNC: 4.6 MMOL/L (ref 3.5–5.1)
PROT SERPL-MCNC: 7.2 G/DL (ref 6.4–8.2)
PSA SERPL-MCNC: 1.3 NG/ML (ref 0.01–4)
RBC # BLD AUTO: 4.63 M/UL (ref 4.1–5.7)
SODIUM SERPL-SCNC: 138 MMOL/L (ref 136–145)
TRIGL SERPL-MCNC: 109 MG/DL (ref ?–150)
VLDLC SERPL CALC-MCNC: 21.8 MG/DL
WBC # BLD AUTO: 6.9 K/UL (ref 4.1–11.1)

## 2022-07-26 PROCEDURE — G8417 CALC BMI ABV UP PARAM F/U: HCPCS | Performed by: FAMILY MEDICINE

## 2022-07-26 PROCEDURE — G8753 SYS BP > OR = 140: HCPCS | Performed by: FAMILY MEDICINE

## 2022-07-26 PROCEDURE — G8754 DIAS BP LESS 90: HCPCS | Performed by: FAMILY MEDICINE

## 2022-07-26 PROCEDURE — G8510 SCR DEP NEG, NO PLAN REQD: HCPCS | Performed by: FAMILY MEDICINE

## 2022-07-26 PROCEDURE — G0463 HOSPITAL OUTPT CLINIC VISIT: HCPCS | Performed by: FAMILY MEDICINE

## 2022-07-26 PROCEDURE — 3017F COLORECTAL CA SCREEN DOC REV: CPT | Performed by: FAMILY MEDICINE

## 2022-07-26 PROCEDURE — 1123F ACP DISCUSS/DSCN MKR DOCD: CPT | Performed by: FAMILY MEDICINE

## 2022-07-26 PROCEDURE — G8427 DOCREV CUR MEDS BY ELIG CLIN: HCPCS | Performed by: FAMILY MEDICINE

## 2022-07-26 PROCEDURE — 1101F PT FALLS ASSESS-DOCD LE1/YR: CPT | Performed by: FAMILY MEDICINE

## 2022-07-26 PROCEDURE — 99214 OFFICE O/P EST MOD 30 MIN: CPT | Performed by: FAMILY MEDICINE

## 2022-07-26 PROCEDURE — 2022F DILAT RTA XM EVC RTNOPTHY: CPT | Performed by: FAMILY MEDICINE

## 2022-07-26 PROCEDURE — G8536 NO DOC ELDER MAL SCRN: HCPCS | Performed by: FAMILY MEDICINE

## 2022-07-26 PROCEDURE — 3044F HG A1C LEVEL LT 7.0%: CPT | Performed by: FAMILY MEDICINE

## 2022-07-26 NOTE — PROGRESS NOTES
Chief Complaint   Patient presents with    Diabetes    Medication Evaluation     Would like to discuss if he should be taking any vitamins in particular        1. \"Have you been to the ER, urgent care clinic since your last visit? Hospitalized since your last visit? \" No    2. \"Have you seen or consulted any other health care providers outside of the [address] [place] [address] since your last visit? \" No     3. For patients aged 39-70: Has the patient had a colonoscopy / FIT/ Cologuard? Yes - Care Gap present. Rooming MA/LPN to request most recent results Dr. Lazarus Plenty       If the patient is female:    4. For patients aged 41-77: Has the patient had a mammogram within the past 2 years? NA - based on age or sex      11. For patients aged 21-65: Has the patient had a pap smear?  NA - based on age or sex    3 most recent PHQ Screens 7/26/2022   Little interest or pleasure in doing things Not at all   Feeling down, depressed, irritable, or hopeless Not at all   Total Score PHQ 2 0

## 2022-07-26 NOTE — PROGRESS NOTES
Patient Name: Esther Rubin   MRN: [id number]    Avery Fair is a 66 y.o. male who presents with the following:     Is wondering if he needs to take certain vitamins; takes daily MVI. Hx of vitamin D deficiency. Also wants to know if he needs to continue baby ASA for prevention. Checks home BPs daily, usually normal. Has some white coat HTN. Lab Results   Component Value Date/Time    Hemoglobin A1c 6.0 (H) 09/23/2021 09:00 AM    Hemoglobin A1c (POC) 6.8 04/15/2022 08:15 AM     Wt Readings from Last 3 Encounters:   07/26/22 186 lb 12.8 oz (84.7 kg)   04/15/22 189 lb 9.6 oz (86 kg)   09/23/21 187 lb 6.4 oz (85 kg)     BP Readings from Last 3 Encounters:   07/26/22 (!) 158/84   04/15/22 128/74   09/23/21 (!) 155/84     Review of Systems   Constitutional:  Negative for fever, malaise/fatigue and weight loss. Respiratory:  Negative for cough, hemoptysis, shortness of breath and wheezing. Cardiovascular:  Negative for chest pain, palpitations, leg swelling and PND. Gastrointestinal:  Negative for abdominal pain, constipation, diarrhea, nausea and vomiting. The patient's medications, allergies, past medical history, surgical history, family history and social history were reviewed and updated where appropriate. Current Outpatient Medications:     metFORMIN (GLUCOPHAGE) 500 mg tablet, TAKE 2 TABLETS 2 TIMES     DAILY WITH MEALS, Disp: 360 Tablet, Rfl: 3    losartan (COZAAR) 100 mg tablet, Take 1 Tablet by mouth daily. , Disp: 90 Tablet, Rfl: 3    OneTouch Ultra Test strip, USE TO CHECK BLOOD SUGAR ONCE DAILY. DX: E11.9, Disp: 100 Strip, Rfl: 3    OneTouch Delica Plus Lancet 33 gauge misc, TEST FASTING GLUCOSE DAILY DX E11.8, Disp: 100 Each, Rfl: 2    pravastatin (PRAVACHOL) 20 mg tablet, TAKE 1 TABLET DAILY, Disp: 90 Tablet, Rfl: 3    glipiZIDE SR (GLUCOTROL XL) 5 mg CR tablet, TAKE 1 TABLET DAILY, Disp: 90 Tablet, Rfl: 3    aspirin delayed-release 81 mg tablet, Take 81 mg by mouth daily. , Disp: , Rfl:     multivitamin (ONE A DAY) tablet, Take 1 Tab by mouth daily. , Disp: , Rfl:     Allergies   Allergen Reactions    Iodine Nausea Only     Shrimp         OBJECTIVE    Visit Vitals  BP (!) 158/84 (BP 1 Location: Left upper arm, BP Patient Position: Sitting, BP Cuff Size: Adult)   Pulse 70   Temp 97.7 °F (36.5 °C) (Temporal)   Resp 16   Ht 5' 9\" (1.753 m)   Wt 186 lb 12.8 oz (84.7 kg)   SpO2 98%   BMI 27.59 kg/m²       Physical Exam  Vitals and nursing note reviewed. Constitutional:       General: He is not in acute distress. Appearance: Normal appearance. He is not toxic-appearing. HENT:      Head: Normocephalic and atraumatic. Eyes:      Pupils: Pupils are equal, round, and reactive to light. Pulmonary:      Effort: Pulmonary effort is normal.   Musculoskeletal:         General: Normal range of motion. Skin:     General: Skin is warm and dry. Neurological:      Mental Status: He is alert. Mental status is at baseline. Psychiatric:         Mood and Affect: Mood normal.         Behavior: Behavior normal.         ASSESSMENT AND PLAN  Feilcia Turner is a 66 y.o. male who presents today for:    1. Controlled type 2 diabetes mellitus without complication, without long-term current use of insulin (HCC)  Stable, continue current treatment pending review of labs. - HEMOGLOBIN A1C WITH EAG; Future  - MICROALBUMIN, UR, RAND W/ MICROALB/CREAT RATIO; Future  - MICROALBUMIN, UR, RAND W/ MICROALB/CREAT RATIO  - HEMOGLOBIN A1C WITH EAG    2. HTN, goal below 140/90  Pt to continue to monitor at home. 3. Hyperlipidemia, unspecified hyperlipidemia type  Will calculate ASCVD risk score pending labs. Will determine if he needs daily baby ASA. - CBC W/O DIFF; Future  - METABOLIC PANEL, COMPREHENSIVE; Future  - LIPID PANEL; Future  - LIPID PANEL  - METABOLIC PANEL, COMPREHENSIVE  - CBC W/O DIFF    4. Prostate cancer screening  - PSA, DIAGNOSTIC (PROSTATE SPECIFIC AG);  Future  - PSA, DIAGNOSTIC (PROSTATE SPECIFIC AG)    5. Vitamin D deficiency  - VITAMIN D, 25 HYDROXY; Future  - VITAMIN D, 25 HYDROXY       There are no discontinued medications. Treatment risks/benefits/costs/interactions/alternatives discussed with patient. Advised patient to call back or return to office if symptoms worsen/change/persist. If patient cannot reach us or should anything more severe/urgent arise he/she should proceed directly to the nearest emergency department. Discussed expected course/resolution/complications of diagnosis in detail with patient. Patient expressed understanding with the diagnosis and plan. This dictation may have been completed with Dragon, the [employer] voice recognition software. Unanticipated grammatical, syntax, homophones, and other interpretive errors are sometimes inadvertently transcribed by the computer software. Please disregard any errors that have escaped final proofreading. Aivi N. Lisabeth Spurling M.D. Bula Dayhoff a

## 2022-07-28 NOTE — PROGRESS NOTES
Dear Mr. Melisa Howard,    I wanted to follow up on your recent test results:    A1c is the same at 6.8. Cholesterol is normal. Since your blood pressure was high at your last visit, I would keep the baby aspirin on board until your BP normalizes. Is there any strong family history of heart disease, particularly before the age of 48? I recommend a 3 month follow up.

## 2022-09-29 DIAGNOSIS — E11.9 CONTROLLED TYPE 2 DIABETES MELLITUS WITHOUT COMPLICATION, WITHOUT LONG-TERM CURRENT USE OF INSULIN (HCC): ICD-10-CM

## 2022-10-03 RX ORDER — LANCETS 33 GAUGE
EACH MISCELLANEOUS
Qty: 100 EACH | Refills: 2 | Status: SHIPPED | OUTPATIENT
Start: 2022-10-03

## 2022-10-11 DIAGNOSIS — E11.9 CONTROLLED TYPE 2 DIABETES MELLITUS WITHOUT COMPLICATION, WITHOUT LONG-TERM CURRENT USE OF INSULIN (HCC): ICD-10-CM

## 2022-10-11 RX ORDER — PRAVASTATIN SODIUM 20 MG/1
TABLET ORAL
Qty: 90 TABLET | Refills: 3 | Status: SHIPPED | OUTPATIENT
Start: 2022-10-11

## 2022-12-27 ENCOUNTER — OFFICE VISIT (OUTPATIENT)
Dept: FAMILY MEDICINE CLINIC | Age: 72
End: 2022-12-27
Payer: MEDICARE

## 2022-12-27 VITALS
HEIGHT: 69 IN | WEIGHT: 191 LBS | HEART RATE: 73 BPM | DIASTOLIC BLOOD PRESSURE: 78 MMHG | TEMPERATURE: 98.5 F | RESPIRATION RATE: 16 BRPM | OXYGEN SATURATION: 97 % | SYSTOLIC BLOOD PRESSURE: 136 MMHG | BODY MASS INDEX: 28.29 KG/M2

## 2022-12-27 DIAGNOSIS — E11.9 CONTROLLED TYPE 2 DIABETES MELLITUS WITHOUT COMPLICATION, WITHOUT LONG-TERM CURRENT USE OF INSULIN (HCC): ICD-10-CM

## 2022-12-27 DIAGNOSIS — E78.5 HYPERLIPIDEMIA, UNSPECIFIED HYPERLIPIDEMIA TYPE: ICD-10-CM

## 2022-12-27 DIAGNOSIS — Z12.83 SKIN CANCER SCREENING: ICD-10-CM

## 2022-12-27 DIAGNOSIS — J06.9 UPPER RESPIRATORY TRACT INFECTION, UNSPECIFIED TYPE: ICD-10-CM

## 2022-12-27 DIAGNOSIS — Z00.00 ENCOUNTER FOR MEDICARE ANNUAL WELLNESS EXAM: Primary | ICD-10-CM

## 2022-12-27 DIAGNOSIS — I10 HTN, GOAL BELOW 140/90: ICD-10-CM

## 2022-12-27 PROCEDURE — G0463 HOSPITAL OUTPT CLINIC VISIT: HCPCS | Performed by: FAMILY MEDICINE

## 2022-12-27 PROCEDURE — G0439 PPPS, SUBSEQ VISIT: HCPCS | Performed by: FAMILY MEDICINE

## 2022-12-27 PROCEDURE — 3017F COLORECTAL CA SCREEN DOC REV: CPT | Performed by: FAMILY MEDICINE

## 2022-12-27 PROCEDURE — 2022F DILAT RTA XM EVC RTNOPTHY: CPT | Performed by: FAMILY MEDICINE

## 2022-12-27 PROCEDURE — 3044F HG A1C LEVEL LT 7.0%: CPT | Performed by: FAMILY MEDICINE

## 2022-12-27 PROCEDURE — G8417 CALC BMI ABV UP PARAM F/U: HCPCS | Performed by: FAMILY MEDICINE

## 2022-12-27 PROCEDURE — G8536 NO DOC ELDER MAL SCRN: HCPCS | Performed by: FAMILY MEDICINE

## 2022-12-27 PROCEDURE — 1123F ACP DISCUSS/DSCN MKR DOCD: CPT | Performed by: FAMILY MEDICINE

## 2022-12-27 PROCEDURE — G8427 DOCREV CUR MEDS BY ELIG CLIN: HCPCS | Performed by: FAMILY MEDICINE

## 2022-12-27 PROCEDURE — 1101F PT FALLS ASSESS-DOCD LE1/YR: CPT | Performed by: FAMILY MEDICINE

## 2022-12-27 PROCEDURE — 3078F DIAST BP <80 MM HG: CPT | Performed by: FAMILY MEDICINE

## 2022-12-27 PROCEDURE — G8432 DEP SCR NOT DOC, RNG: HCPCS | Performed by: FAMILY MEDICINE

## 2022-12-27 PROCEDURE — 99213 OFFICE O/P EST LOW 20 MIN: CPT | Performed by: FAMILY MEDICINE

## 2022-12-27 PROCEDURE — 3074F SYST BP LT 130 MM HG: CPT | Performed by: FAMILY MEDICINE

## 2022-12-27 NOTE — PROGRESS NOTES
Chief Complaint   Patient presents with    Cough    Medication Refill         1. \"Have you been to the ER, urgent care clinic since your last visit? Hospitalized since your last visit? \" No    2. \"Have you seen or consulted any other health care providers outside of the [address] [place] [address] since your last visit? \" No     3. For patients aged 39-70: Has the patient had a colonoscopy / FIT/ Cologuard? Yes - no Care Gap present      If the patient is female:    4. For patients aged 41-77: Has the patient had a mammogram within the past 2 years? NA - based on age or sex      11. For patients aged 21-65: Has the patient had a pap smear?  NA - based on age or sex         3 most recent PHQ Screens 12/27/2022   Little interest or pleasure in doing things Not at all   Feeling down, depressed, irritable, or hopeless Not at all   Total Score PHQ 2 0       Health Maintenance Due   Topic Date Due    Foot Exam Q1  01/12/2018    Eye Exam Retinal or Dilated  11/02/2020    COVID-19 Vaccine (4 - Booster for Moderna series) 12/28/2021    Flu Vaccine (1) 08/01/2022    Shingles Vaccine (2 of 2) 09/01/2022    Medicare Yearly Exam  09/24/2022

## 2022-12-27 NOTE — PROGRESS NOTES
This is the Subsequent Medicare Annual Wellness Exam, performed 12 months or more after the Initial AWV or the last Subsequent AWV    I have reviewed the patient's medical history in detail and updated the computerized patient record. Assessment/Plan   Education and counseling provided:  Are appropriate based on today's review and evaluation    1. Encounter for Medicare annual wellness exam  2. Controlled type 2 diabetes mellitus without complication, without long-term current use of insulin (HCC)  -     HEMOGLOBIN A1C WITH EAG; Future  -     MICROALBUMIN, UR, RAND W/ MICROALB/CREAT RATIO; Future  3. HTN, goal below 140/90  4. Hyperlipidemia, unspecified hyperlipidemia type  -     CBC W/O DIFF; Future  -     METABOLIC PANEL, COMPREHENSIVE; Future  -     LIPID PANEL; Future  5. Skin cancer screening  -     REFERRAL TO DERMATOLOGY  6. Upper respiratory tract infection, unspecified type       Depression Risk Factor Screening     3 most recent PHQ Screens 12/27/2022   Little interest or pleasure in doing things Not at all   Feeling down, depressed, irritable, or hopeless Not at all   Total Score PHQ 2 0       Alcohol & Drug Abuse Risk Screen    Do you average more than 1 drink per night or more than 7 drinks a week: No    In the past three months have you have had more than 4 drinks containing alcohol on one occasion: No          Functional Ability and Level of Safety    Hearing: Hearing is good. Activities of Daily Living: The home contains: no safety equipment. Patient does total self care      Ambulation: with no difficulty     Fall Risk:  Fall Risk Assessment, last 12 mths 12/27/2022   Able to walk? Yes   Fall in past 12 months? 0   Do you feel unsteady?  0   Are you worried about falling 0      Abuse Screen:  Patient is not abused       Cognitive Screening    Has your family/caregiver stated any concerns about your memory: no       Health Maintenance Due     Health Maintenance Due   Topic Date Due Foot Exam Q1  01/12/2018    Eye Exam Retinal or Dilated  11/02/2020    COVID-19 Vaccine (4 - Booster for Moderna series) 12/28/2021    Flu Vaccine (1) 08/01/2022    Shingles Vaccine (2 of 2) 09/01/2022       Patient Care Team   Patient Care Team:  Kinza Ellis MD as PCP - General (Family Medicine)  Kinza Ellis MD as PCP - [address] [place] [address] Provider  Gaby Marx MD as Physician (Otolaryngology)  Betty Thompson MD (Gastroenterology)    History     Patient Active Problem List   Diagnosis Code    Diabetes mellitus type 2, controlled, without complications ([address] X89.6    H/O blastomycosis Z86.19    Hyperlipidemia with target LDL less than 100 E78.5    Acoustic neuroma ([address] D33.3    HTN, goal below 140/90 I10     Past Medical History:   Diagnosis Date    Acoustic neuroma ([address] 05/28/2018    left ear    Acoustic neuroma ([address]     Blastomycosis 1990    (treated with Sporanox) -Left Lung    Diabetes mellitus type 2, controlled, without complications ([address] 25/30/5483    History of colonic polyps     HTN, goal below 140/90 6/11/2019      Past Surgical History:   Procedure Laterality Date    HX ANKLE FRACTURE TX Right 01/2018    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    HX ANKLE FRACTURE TX Left 01/2019    HX COLONOSCOPY  2005    several polyps    HX HEENT      tonsillectomy (age 10)    HX ORTHOPAEDIC  1962    L Plantar fascia trimmed     Current Outpatient Medications   Medication Sig Dispense Refill    glipiZIDE SR (GLUCOTROL XL) 5 mg CR tablet TAKE 1 TABLET DAILY 90 Tablet 0    pravastatin (PRAVACHOL) 20 mg tablet TAKE 1 TABLET DAILY 90 Tablet 3    lancets (OneTouch Delica Plus Lancet) 33 gauge misc TEST FASTING GLUCOSE DAILY DX E11.8 100 Each 2    metFORMIN (GLUCOPHAGE) 500 mg tablet TAKE 2 TABLETS 2 TIMES     DAILY WITH MEALS 360 Tablet 3    losartan (COZAAR) 100 mg tablet Take 1 Tablet by mouth daily. 90 Tablet 3    OneTouch Ultra Test strip USE TO CHECK BLOOD SUGAR ONCE DAILY. DX: E11.9 100 Strip 3    aspirin delayed-release 81 mg tablet Take 81 mg by mouth daily. multivitamin (ONE A DAY) tablet Take 1 Tab by mouth daily. Allergies   Allergen Reactions    Iodine Nausea Only     Shrimp       Family History   Problem Relation Age of Onset    Cancer Father         prostate    Diabetes Father         adult-onset    Stroke Father     Dementia Mother     Psychiatric Disorder Mother     Diabetes Mother      Social History     Tobacco Use    Smoking status: Never    Smokeless tobacco: Never   Substance Use Topics    Alcohol use:  Yes     Alcohol/week: 0.0 standard drinks     Comment: rare to occasional         Anju Sullivan MD

## 2022-12-27 NOTE — PROGRESS NOTES
Patient Name: Bryson Smith   MRN: [id number]    Mala Ramos is a 67 y.o. male who presents with the following:     Colon Cancer Screening: up to date. Hep C: neg   PSA: up to date  Lab Results   Component Value Date/Time    Prostate Specific Ag 1.3 07/26/2022 08:59 AM    Prostate Specific Ag 1.1 09/23/2021 09:00 AM    Prostate Specific Ag 1.2 03/16/2020 08:36 AM    Prostate Specific Ag 1.1 03/15/2019 03:15 PM    Prostate Specific Ag 1.2 10/16/2017 07:32 AM     CAD risk factors:  HTN: wnl on meds  BP Readings from Last 3 Encounters:   12/27/22 136/78   07/26/22 (!) 158/84   04/15/22 128/74     Lipid: on statin  Lab Results   Component Value Date/Time    Cholesterol, total 135 07/26/2022 08:59 AM    HDL Cholesterol 51 07/26/2022 08:59 AM    LDL, calculated 62.2 07/26/2022 08:59 AM    VLDL, calculated 21.8 07/26/2022 08:59 AM    Triglyceride 109 07/26/2022 08:59 AM    CHOL/HDL Ratio 2.6 07/26/2022 08:59 AM     DM: fasting sugars have been higher, in the 200s; open to SLGT2i if A1c not at goal  Lab Results   Component Value Date/Time    Hemoglobin A1c 6.8 (H) 07/26/2022 08:59 AM    Hemoglobin A1c (POC) 6.8 04/15/2022 08:15 AM     Reports some erectile dysfunction but wants to hold off on meds for now. Two week hx of URI symptoms including nasal drainage and post nasal drip. Symptoms overall improving. Denies fevers. Review of Systems   Constitutional:  Negative for fever, malaise/fatigue and weight loss. Respiratory:  Negative for cough, hemoptysis, shortness of breath and wheezing. Cardiovascular:  Negative for chest pain, palpitations, leg swelling and PND. Gastrointestinal:  Negative for abdominal pain, constipation, diarrhea, nausea and vomiting. The patient's medications, allergies, past medical history, surgical history, family history and social history were reviewed and updated where appropriate.       Current Outpatient Medications:     glipiZIDE SR (GLUCOTROL XL) 5 mg CR tablet, TAKE 1 TABLET DAILY, Disp: 90 Tablet, Rfl: 0    pravastatin (PRAVACHOL) 20 mg tablet, TAKE 1 TABLET DAILY, Disp: 90 Tablet, Rfl: 3    lancets (OneTouch Delica Plus Lancet) 33 gauge mis, TEST FASTING GLUCOSE DAILY DX E11.8, Disp: 100 Each, Rfl: 2    metFORMIN (GLUCOPHAGE) 500 mg tablet, TAKE 2 TABLETS 2 TIMES     DAILY WITH MEALS, Disp: 360 Tablet, Rfl: 3    losartan (COZAAR) 100 mg tablet, Take 1 Tablet by mouth daily. , Disp: 90 Tablet, Rfl: 3    OneTouch Ultra Test strip, USE TO CHECK BLOOD SUGAR ONCE DAILY. DX: E11.9, Disp: 100 Strip, Rfl: 3    aspirin delayed-release 81 mg tablet, Take 81 mg by mouth daily. , Disp: , Rfl:     multivitamin (ONE A DAY) tablet, Take 1 Tab by mouth daily. , Disp: , Rfl:     Allergies   Allergen Reactions    Iodine Nausea Only     Shrimp         OBJECTIVE    Visit Vitals  /78 (BP 1 Location: Right arm, BP Patient Position: Sitting, BP Cuff Size: Adult)   Pulse 73   Temp 98.5 °F (36.9 °C) (Temporal)   Resp 16   Ht 5' 9\" (1.753 m)   Wt 191 lb (86.6 kg)   SpO2 97%   BMI 28.21 kg/m²       Physical Exam  Vitals and nursing note reviewed. Constitutional:       General: He is not in acute distress. Appearance: He is not diaphoretic. Eyes:      Conjunctiva/sclera: Conjunctivae normal.      Pupils: Pupils are equal, round, and reactive to light. Cardiovascular:      Rate and Rhythm: Normal rate and regular rhythm. Heart sounds: Normal heart sounds. No murmur heard. No friction rub. No gallop. Pulmonary:      Effort: Pulmonary effort is normal. No respiratory distress. Breath sounds: Normal breath sounds. No wheezing. Skin:     General: Skin is warm and dry. Neurological:      Mental Status: He is alert. ASSESSMENT AND PLAN  Warren Castillo is a 67 y.o. male who presents today for:    1. Encounter for Medicare annual wellness exam    2.  Controlled type 2 diabetes mellitus without complication, without long-term current use of insulin (HCC)  Stable, continue current treatment pending review of labs. Consider switching from glipizide  to SLGT2i if additional control is needed. - HEMOGLOBIN A1C WITH EAG; Future  - MICROALBUMIN, UR, RAND W/ MICROALB/CREAT RATIO; Future  - MICROALBUMIN, UR, RAND W/ MICROALB/CREAT RATIO  - HEMOGLOBIN A1C WITH EAG    3. HTN, goal below 140/90  Stable, continue current treatment. 4. Hyperlipidemia, unspecified hyperlipidemia type  - CBC W/O DIFF; Future  - METABOLIC PANEL, COMPREHENSIVE; Future  - LIPID PANEL; Future  - LIPID PANEL  - METABOLIC PANEL, COMPREHENSIVE  - CBC W/O DIFF    5. Skin cancer screening  - REFERRAL TO DERMATOLOGY    6. Upper respiratory tract infection, unspecified type  discussed diagnosis & treatment options, most likely viral at this time, reviewed the importance of avoiding unnecessary antibiotic therapy,  Zyrtec, Flonase. Reviewed signs and symptoms that would indicate a worsening medical condition which would require immediate evaluation and treatment; patient expressed understanding of plan. There are no discontinued medications. Treatment risks/benefits/costs/interactions/alternatives discussed with patient. Advised patient to call back or return to office if symptoms worsen/change/persist. If patient cannot reach us or should anything more severe/urgent arise he/she should proceed directly to the nearest emergency department. Discussed expected course/resolution/complications of diagnosis in detail with patient. Patient expressed understanding with the diagnosis and plan. This dictation may have been completed with Dragon, the computer voice recognition software. Unanticipated grammatical, syntax, homophones, and other interpretive errors are sometimes inadvertently transcribed by the computer software. Please disregard any errors that have escaped final proofreading. Rom Mckeon M.D.

## 2022-12-28 LAB
ALBUMIN SERPL-MCNC: 4.4 G/DL (ref 3.5–5)
ALBUMIN/GLOB SERPL: 1.5 {RATIO} (ref 1.1–2.2)
ALP SERPL-CCNC: 93 U/L (ref 45–117)
ALT SERPL-CCNC: 22 U/L (ref 12–78)
ANION GAP SERPL CALC-SCNC: 6 MMOL/L (ref 5–15)
AST SERPL-CCNC: 17 U/L (ref 15–37)
BILIRUB SERPL-MCNC: 0.5 MG/DL (ref 0.2–1)
BUN SERPL-MCNC: 15 MG/DL (ref 6–20)
BUN/CREAT SERPL: 16 (ref 12–20)
CALCIUM SERPL-MCNC: 9.4 MG/DL (ref 8.5–10.1)
CHLORIDE SERPL-SCNC: 103 MMOL/L (ref 97–108)
CHOLEST SERPL-MCNC: 150 MG/DL
CO2 SERPL-SCNC: 27 MMOL/L (ref 21–32)
CREAT SERPL-MCNC: 0.91 MG/DL (ref 0.7–1.3)
CREAT UR-MCNC: 42.5 MG/DL
ERYTHROCYTE [DISTWIDTH] IN BLOOD BY AUTOMATED COUNT: 11.6 % (ref 11.5–14.5)
EST. AVERAGE GLUCOSE BLD GHB EST-MCNC: 206 MG/DL
GLOBULIN SER CALC-MCNC: 2.9 G/DL (ref 2–4)
GLUCOSE SERPL-MCNC: 222 MG/DL (ref 65–100)
HBA1C MFR BLD: 8.8 % (ref 4–5.6)
HCT VFR BLD AUTO: 42 % (ref 36.6–50.3)
HDLC SERPL-MCNC: 45 MG/DL
HDLC SERPL: 3.3 {RATIO} (ref 0–5)
HGB BLD-MCNC: 14.2 G/DL (ref 12.1–17)
LDLC SERPL CALC-MCNC: 82.2 MG/DL (ref 0–100)
MCH RBC QN AUTO: 31.4 PG (ref 26–34)
MCHC RBC AUTO-ENTMCNC: 33.8 G/DL (ref 30–36.5)
MCV RBC AUTO: 92.9 FL (ref 80–99)
MICROALBUMIN UR-MCNC: 0.66 MG/DL
MICROALBUMIN/CREAT UR-RTO: 16 MG/G (ref 0–30)
NRBC # BLD: 0 K/UL (ref 0–0.01)
NRBC BLD-RTO: 0 PER 100 WBC
PLATELET # BLD AUTO: 250 K/UL (ref 150–400)
PMV BLD AUTO: 9.9 FL (ref 8.9–12.9)
POTASSIUM SERPL-SCNC: 4.3 MMOL/L (ref 3.5–5.1)
PROT SERPL-MCNC: 7.3 G/DL (ref 6.4–8.2)
RBC # BLD AUTO: 4.52 M/UL (ref 4.1–5.7)
SODIUM SERPL-SCNC: 136 MMOL/L (ref 136–145)
TRIGL SERPL-MCNC: 114 MG/DL (ref ?–150)
VLDLC SERPL CALC-MCNC: 22.8 MG/DL
WBC # BLD AUTO: 7 K/UL (ref 4.1–11.1)

## 2022-12-29 DIAGNOSIS — E11.9 CONTROLLED TYPE 2 DIABETES MELLITUS WITHOUT COMPLICATION, WITHOUT LONG-TERM CURRENT USE OF INSULIN (HCC): Primary | ICD-10-CM

## 2022-12-29 DIAGNOSIS — E11.9 TYPE 2 DIABETES MELLITUS WITHOUT COMPLICATION, WITHOUT LONG-TERM CURRENT USE OF INSULIN (HCC): ICD-10-CM

## 2022-12-29 NOTE — PROGRESS NOTES
Called patient deliver the lab results, patient will continue with medication for now also with glucose monitoring glucose, will follow up in 3 months.

## 2022-12-29 NOTE — PROGRESS NOTES
Please call patient:    A1c is much higher at 8.8. I will add on low dose Jardiance along with his current medications. Would keep the other meds the same for now. Pt to track his fasting glucose (goal <130); he can send me numbers through [employer] as well. Needs follow up in 3 months.

## 2023-01-27 ENCOUNTER — NURSE TRIAGE (OUTPATIENT)
Dept: OTHER | Facility: CLINIC | Age: 73
End: 2023-01-27

## 2023-01-27 ENCOUNTER — TELEPHONE (OUTPATIENT)
Dept: FAMILY MEDICINE CLINIC | Age: 73
End: 2023-01-27

## 2023-01-27 NOTE — TELEPHONE ENCOUNTER
Adonis Nieto [facility]) called stated \"pt was triaged for brownish nasal drainage nurse wants him to be seem in the office today, but I cannot schedule close to appt time. \"    Pt transferred, advised pt that it was 0912 and the appt is 0915 and there were no opal period per office protocol. Pt stated he can be in the office in 5 minutes. Informed pt that if he arrives late for his appt and he will have to reschedule. Pt's wife Jesika Carvalho) got on the phone stated  \"my  will be seen today so tell Andre Adam he is on his way. \"Advised her that I did not scheduled an appt for the pt. Informed her that we did have openings on 1/30/23. She got upset and stated \"my  is sick and I need him to be seen today. \" Requested callback from the nurse ASAP.     [facility]# [phone]

## 2023-01-27 NOTE — TELEPHONE ENCOUNTER
Location of patient: [address] [facility]  call from [place] at [facility] with [employer]. Current Symptoms: Pt reports brown nasal drainage and fatigue. Onset: One week ago    Associated Symptoms: NA    Pain Severity: Denies pain    Temperature: Denies fever    What has been tried: Nothing yet    LMP: NA Pregnant: NA    Recommended disposition: See in Office Today or Tomorrow    Care advice provided, patient verbalizes understanding; denies any other questions or concerns; instructed to call back for any new or worsening symptoms. Patient/Caller agrees with recommended disposition; writer provided warm transfer to [facility] at [facility] for appointment scheduling    Attention Provider: Thank you for allowing me to participate in the care of your patient. The patient was connected to triage in response to information provided to the [facility]. Please do not respond through this encounter as the response is not directed to a shared pool.     Reason for Disposition   Patient wants to be seen    Protocols used: Common Cold-ADULT-[place]

## 2023-01-31 NOTE — TELEPHONE ENCOUNTER
Contacted patient. Two identification obtained. Patient stated he has been [facility] and was prescribed some medication. He is feeling better.  Do not need any appointment at this time

## 2023-03-30 ENCOUNTER — OFFICE VISIT (OUTPATIENT)
Dept: FAMILY MEDICINE CLINIC | Age: 73
End: 2023-03-30
Payer: MEDICARE

## 2023-03-30 VITALS
OXYGEN SATURATION: 98 % | HEART RATE: 69 BPM | BODY MASS INDEX: 27.99 KG/M2 | HEIGHT: 69 IN | TEMPERATURE: 98.1 F | WEIGHT: 189 LBS | RESPIRATION RATE: 16 BRPM | SYSTOLIC BLOOD PRESSURE: 132 MMHG | DIASTOLIC BLOOD PRESSURE: 76 MMHG

## 2023-03-30 DIAGNOSIS — D33.3 ACOUSTIC NEUROMA (HCC): ICD-10-CM

## 2023-03-30 DIAGNOSIS — Z91.89 AT RISK FOR CARDIOVASCULAR EVENT: ICD-10-CM

## 2023-03-30 DIAGNOSIS — I10 HTN, GOAL BELOW 140/90: ICD-10-CM

## 2023-03-30 DIAGNOSIS — E11.9 TYPE 2 DIABETES MELLITUS WITHOUT COMPLICATION, WITHOUT LONG-TERM CURRENT USE OF INSULIN (HCC): Primary | ICD-10-CM

## 2023-03-30 PROCEDURE — 3046F HEMOGLOBIN A1C LEVEL >9.0%: CPT | Performed by: FAMILY MEDICINE

## 2023-03-30 PROCEDURE — 3075F SYST BP GE 130 - 139MM HG: CPT | Performed by: FAMILY MEDICINE

## 2023-03-30 PROCEDURE — 2022F DILAT RTA XM EVC RTNOPTHY: CPT | Performed by: FAMILY MEDICINE

## 2023-03-30 PROCEDURE — G8510 SCR DEP NEG, NO PLAN REQD: HCPCS | Performed by: FAMILY MEDICINE

## 2023-03-30 PROCEDURE — G0463 HOSPITAL OUTPT CLINIC VISIT: HCPCS | Performed by: FAMILY MEDICINE

## 2023-03-30 PROCEDURE — G8417 CALC BMI ABV UP PARAM F/U: HCPCS | Performed by: FAMILY MEDICINE

## 2023-03-30 PROCEDURE — G8427 DOCREV CUR MEDS BY ELIG CLIN: HCPCS | Performed by: FAMILY MEDICINE

## 2023-03-30 PROCEDURE — G8536 NO DOC ELDER MAL SCRN: HCPCS | Performed by: FAMILY MEDICINE

## 2023-03-30 PROCEDURE — 1101F PT FALLS ASSESS-DOCD LE1/YR: CPT | Performed by: FAMILY MEDICINE

## 2023-03-30 PROCEDURE — 99214 OFFICE O/P EST MOD 30 MIN: CPT | Performed by: FAMILY MEDICINE

## 2023-03-30 PROCEDURE — 3078F DIAST BP <80 MM HG: CPT | Performed by: FAMILY MEDICINE

## 2023-03-30 PROCEDURE — 1123F ACP DISCUSS/DSCN MKR DOCD: CPT | Performed by: FAMILY MEDICINE

## 2023-03-30 PROCEDURE — 3017F COLORECTAL CA SCREEN DOC REV: CPT | Performed by: FAMILY MEDICINE

## 2023-03-30 RX ORDER — FLASH GLUCOSE SCANNING READER
EACH MISCELLANEOUS
Qty: 1 EACH | Refills: 0 | Status: SHIPPED | OUTPATIENT
Start: 2023-03-30

## 2023-03-30 RX ORDER — FLASH GLUCOSE SENSOR
KIT MISCELLANEOUS
Qty: 1 KIT | Refills: 2 | Status: SHIPPED | OUTPATIENT
Start: 2023-03-30

## 2023-03-30 NOTE — PROGRESS NOTES
Chief Complaint   Patient presents with    Diabetes    Cholesterol Problem    Hypertension     1. \"Have you been to the ER, urgent care clinic since your last visit? Hospitalized since your last visit? \" VV for sinus infection     2. \"Have you seen or consulted any other health care providers outside of the [address] [place] [address] since your last visit? \"  no    Foot -no     Eye- back in November

## 2023-03-30 NOTE — PROGRESS NOTES
Patient Name: Suhail Cox   MRN: [id number]    Freeman Varghese is a 67 y.o. male who presents with the following:     Since last visit, added Jardiance due to elevated A1c. Fasting average glucose levels have reduced from 200 to now 150. Patient has stopped taking a daily baby aspirin for prevention as he read that it may not be as beneficial for prevention at his age. Denies history of CAD or CVA. The 10-year ASCVD risk score (Neri TOUSSAINT, et al., 2019) is: 42.2%    Values used to calculate the score:      Age: 67 years      Sex: Male      Is Non- : No      Diabetic: Yes      Tobacco smoker: No      Systolic Blood Pressure: 238 mmHg      Is BP treated: Yes      HDL Cholesterol: 45 MG/DL      Total Cholesterol: 150 MG/DL    BP Readings from Last 3 Encounters:   03/30/23 132/76   12/27/22 136/78   07/26/22 (!) 158/84     Wt Readings from Last 3 Encounters:   03/30/23 189 lb (85.7 kg)   12/27/22 191 lb (86.6 kg)   07/26/22 186 lb 12.8 oz (84.7 kg)     Lab Results   Component Value Date/Time    Hemoglobin A1c 8.8 (H) 12/27/2022 12:16 PM    Hemoglobin A1c (POC) 6.8 04/15/2022 08:15 AM     Review of Systems   Constitutional:  Negative for fever, malaise/fatigue and weight loss. Respiratory:  Negative for cough, hemoptysis, shortness of breath and wheezing. Cardiovascular:  Negative for chest pain, palpitations, leg swelling and PND. Gastrointestinal:  Negative for abdominal pain, constipation, diarrhea, nausea and vomiting. The patient's medications, allergies, past medical history, surgical history, family history and social history were reviewed and updated where appropriate.       Current Outpatient Medications:     glipiZIDE SR (GLUCOTROL XL) 5 mg CR tablet, TAKE 1 TABLET DAILY, Disp: 90 Tablet, Rfl: 3    Jardiance 10 mg tablet, TAKE 1 TABLET BY MOUTH EVERY DAY, Disp: 30 Tablet, Rfl: 2    pravastatin (PRAVACHOL) 20 mg tablet, TAKE 1 TABLET DAILY, Disp: 90 Tablet, Rfl: 3 lancets (OneTouch Delica Plus Lancet) 33 gauge misc, TEST FASTING GLUCOSE DAILY DX E11.8, Disp: 100 Each, Rfl: 2    metFORMIN (GLUCOPHAGE) 500 mg tablet, TAKE 2 TABLETS 2 TIMES     DAILY WITH MEALS, Disp: 360 Tablet, Rfl: 3    losartan (COZAAR) 100 mg tablet, Take 1 Tablet by mouth daily. , Disp: 90 Tablet, Rfl: 3    OneTouch Ultra Test strip, USE TO CHECK BLOOD SUGAR ONCE DAILY. DX: E11.9, Disp: 100 Strip, Rfl: 3    aspirin delayed-release 81 mg tablet, Take 81 mg by mouth daily. (Patient not taking: Reported on 3/30/2023), Disp: , Rfl:     multivitamin (ONE A DAY) tablet, Take 1 Tab by mouth daily. (Patient not taking: Reported on 3/30/2023), Disp: , Rfl:     Allergies   Allergen Reactions    Iodine Nausea Only     Shrimp         OBJECTIVE    Visit Vitals  /76 (BP 1 Location: Left upper arm, BP Patient Position: Sitting, BP Cuff Size: Large adult)   Pulse 69   Temp 98.1 °F (36.7 °C) (Temporal)   Resp 16   Ht 5' 9\" (1.753 m)   Wt 189 lb (85.7 kg)   SpO2 98%   BMI 27.91 kg/m²       Physical Exam  Vitals and nursing note reviewed. Constitutional:       General: He is not in acute distress. Appearance: Normal appearance. He is not toxic-appearing. HENT:      Head: Normocephalic and atraumatic. Eyes:      Pupils: Pupils are equal, round, and reactive to light. Pulmonary:      Effort: Pulmonary effort is normal.   Musculoskeletal:         General: Normal range of motion. Skin:     General: Skin is warm and dry. Neurological:      Mental Status: He is alert. Mental status is at baseline. Psychiatric:         Mood and Affect: Mood normal.         Behavior: Behavior normal.         ASSESSMENT AND PLAN  Kim Silvestre is a 67 y.o. male who presents today for:    1. Type 2 diabetes mellitus without complication, without long-term current use of insulin (HCC)  Stable, continue current treatment pending review of labs. - HEMOGLOBIN A1C WITH EAG;  Future  - METABOLIC PANEL, BASIC; Future  - flash glucose sensor (FreeStyle Acosta 2 Sensor) kit; Apply every 2 weeks; ICD 10 E11.8  Dispense: 1 Kit; Refill: 2  - flash glucose scanning reader (FreeStyle [place] 2 [place]) misc; 1 Device by SubCUTAneous route daily. ICD10 E11.8  Dispense: 1 Each; Refill: 0  - METABOLIC PANEL, BASIC  - HEMOGLOBIN A1C WITH EAG    2. HTN, goal below 140/90  Stable, continue current treatment. 3. Acoustic neuroma ([address]  Stable, continue current treatment. 4. At risk for cardiovascular event  Will waiting labs and determine if pt would benefit from daily ASA for primary prevention. Medications Discontinued During This Encounter   Medication Reason    multivitamin (ONE A DAY) tablet LIST CLEANUP    aspirin delayed-release 81 mg tablet LIST CLEANUP     Follow-up and Dispositions    Return in about 3 months (around 6/30/2023) for DM follow up. Treatment risks/benefits/costs/interactions/alternatives discussed with patient. Advised patient to call back or return to office if symptoms worsen/change/persist. If patient cannot reach us or should anything more severe/urgent arise he/she should proceed directly to the nearest emergency department. Discussed expected course/resolution/complications of diagnosis in detail with patient. Patient expressed understanding with the diagnosis and plan. This dictation may have been completed with Dragon, the computer voice recognition software. Unanticipated grammatical, syntax, homophones, and other interpretive errors are sometimes inadvertently transcribed by the computer software. Please disregard any errors that have escaped final proofreading. Rom Erazo M.D.

## 2023-03-31 LAB
ANION GAP SERPL CALC-SCNC: 6 MMOL/L (ref 5–15)
BUN SERPL-MCNC: 23 MG/DL (ref 6–20)
BUN/CREAT SERPL: 23 (ref 12–20)
CALCIUM SERPL-MCNC: 10.2 MG/DL (ref 8.5–10.1)
CHLORIDE SERPL-SCNC: 104 MMOL/L (ref 97–108)
CO2 SERPL-SCNC: 27 MMOL/L (ref 21–32)
CREAT SERPL-MCNC: 1.01 MG/DL (ref 0.7–1.3)
EST. AVERAGE GLUCOSE BLD GHB EST-MCNC: 154 MG/DL
GLUCOSE SERPL-MCNC: 180 MG/DL (ref 65–100)
HBA1C MFR BLD: 7 % (ref 4–5.6)
POTASSIUM SERPL-SCNC: 4.6 MMOL/L (ref 3.5–5.1)
SODIUM SERPL-SCNC: 137 MMOL/L (ref 136–145)

## 2023-05-26 RX ORDER — LOSARTAN POTASSIUM 100 MG/1
TABLET ORAL
Qty: 90 TABLET | Refills: 3 | Status: SHIPPED | OUTPATIENT
Start: 2023-05-26

## 2023-05-26 NOTE — TELEPHONE ENCOUNTER
PCP: Kevin Segundo MD    Last appt: 3/30/23  No future appointments.     Requested Prescriptions     Pending Prescriptions Disp Refills    losartan (COZAAR) 100 MG tablet [Pharmacy Med Name: LOSARTAN TAB 100MG] 90 tablet 3     Sig: TAKE 1 TABLET DAILY       Prior labs and Blood pressures:  BP Readings from Last 3 Encounters:   03/30/23 132/76   12/27/22 136/78   07/26/22 (!) 158/84     Lab Results   Component Value Date/Time     03/30/2023 09:16 AM    K 4.6 03/30/2023 09:16 AM     03/30/2023 09:16 AM    CO2 27 03/30/2023 09:16 AM    BUN 23 03/30/2023 09:16 AM    GFRAA >60 07/26/2022 08:59 AM     Lab Results   Component Value Date/Time    CQG5MARX 6.8 04/15/2022 08:15 AM     Lab Results   Component Value Date/Time    CHOL 150 12/27/2022 12:16 PM    HDL 45 12/27/2022 12:16 PM     No results found for: VITD3, VD3RIA    Lab Results   Component Value Date/Time    TSH 1.69 09/23/2021 09:00 AM

## 2023-06-26 RX ORDER — EMPAGLIFLOZIN 10 MG/1
TABLET, FILM COATED ORAL
Qty: 90 TABLET | Refills: 1 | Status: SHIPPED | OUTPATIENT
Start: 2023-06-26

## 2023-07-01 NOTE — PROGRESS NOTES
Chief Complaint   Patient presents with    Cough     pt state he is still coughing with brownish mucus. Worst at night with drainage. The patient is a 74y Female complaining of

## 2023-07-03 RX ORDER — BLOOD SUGAR DIAGNOSTIC
STRIP MISCELLANEOUS
Qty: 100 STRIP | Refills: 3 | Status: SHIPPED | OUTPATIENT
Start: 2023-07-03

## 2023-07-03 NOTE — TELEPHONE ENCOUNTER
PCP: Ronnie Manley MD    Last appt: 3/30/2023       No future appointments. Requested Prescriptions     Pending Prescriptions Disp Refills    ONETOUCH ULTRA strip [Pharmacy Med Name: ONE TOUCH ULTRA BLUE TEST STRP] 100 strip 3     Sig: USE TO CHECK BLOOD SUGAR ONCE DAILY.  DX: E11.9       Prior labs and Blood pressures:  BP Readings from Last 3 Encounters:   03/30/23 132/76   12/27/22 136/78   07/26/22 (!) 158/84     Lab Results   Component Value Date/Time     03/30/2023 09:16 AM    K 4.6 03/30/2023 09:16 AM     03/30/2023 09:16 AM    CO2 27 03/30/2023 09:16 AM    BUN 23 03/30/2023 09:16 AM    GFRAA >60 07/26/2022 08:59 AM     Lab Results   Component Value Date/Time    IIH6BUWA 6.8 04/15/2022 08:15 AM     Lab Results   Component Value Date/Time    CHOL 150 12/27/2022 12:16 PM    HDL 45 12/27/2022 12:16 PM     No results found for: VITD3, VD3RIA    Lab Results   Component Value Date/Time    TSH 1.69 09/23/2021 09:00 AM

## 2023-07-05 NOTE — TELEPHONE ENCOUNTER
PCP: Maurisio Cedeno MD    Last appt: 3/30/2023       No future appointments.     Requested Prescriptions     Pending Prescriptions Disp Refills    metFORMIN (GLUCOPHAGE) 500 MG tablet [Pharmacy Med Name: METFORMIN TAB 500MG] 360 tablet 3     Sig: TAKE 2 TABLETS 2 TIMES     DAILY WITH MEALS       Prior labs and Blood pressures:  BP Readings from Last 3 Encounters:   03/30/23 132/76   12/27/22 136/78   07/26/22 (!) 158/84     Lab Results   Component Value Date/Time     03/30/2023 09:16 AM    K 4.6 03/30/2023 09:16 AM     03/30/2023 09:16 AM    CO2 27 03/30/2023 09:16 AM    BUN 23 03/30/2023 09:16 AM    GFRAA >60 07/26/2022 08:59 AM     Lab Results   Component Value Date/Time    FOD5FSRY 6.8 04/15/2022 08:15 AM     Lab Results   Component Value Date/Time    CHOL 150 12/27/2022 12:16 PM    HDL 45 12/27/2022 12:16 PM     No results found for: VITD3, VD3RIA    Lab Results   Component Value Date/Time    TSH 1.69 09/23/2021 09:00 AM

## 2023-07-17 NOTE — TELEPHONE ENCOUNTER
[facility] stating needs new rx's for Onetouch test strips and Onetouch delica lancets for this location and needs dx code for Medicare B. RE-entered the lancets as did not crossover. Thanks, Karo Dumont    Last appointment: 3/30/23 MD Beth Gill  Next appointment: None  Previous refill encounter(s):   One touch delica 75/9/40 362 + 2  One touch test strips 7/3/23 100 + 3 (change in pharmacy- needs new rx per [facility])    Requested Prescriptions     Pending Prescriptions Disp Refills    OneTouch Delica Lancets 05I MISC 100 each 3     Sig: USE AS DIRECTED TO CHECK BLOOD SUGAR ONCE DAILY. DX: E11.9 (use brand covered by insurance/Medicare B)    blood glucose test strips (ONETOUCH ULTRA) strip 100 strip 3     Sig: USE TO CHECK BLOOD SUGAR ONCE DAILY.  DX: E11.9  (use brand covered by insurance/Medicare B)     For Pharmacy Admin Tracking Only    Program: Medication Refill  Intervention Detail: New Rx: 2, reason: Patient Preference  Time Spent (min): 10

## 2023-07-18 RX ORDER — LANCETS 33 GAUGE
EACH MISCELLANEOUS
Qty: 100 EACH | Refills: 3 | Status: SHIPPED | OUTPATIENT
Start: 2023-07-18

## 2023-07-18 RX ORDER — BLOOD SUGAR DIAGNOSTIC
STRIP MISCELLANEOUS
Qty: 100 STRIP | Refills: 3 | Status: SHIPPED | OUTPATIENT
Start: 2023-07-18

## 2023-09-22 ENCOUNTER — TELEMEDICINE (OUTPATIENT)
Age: 73
End: 2023-09-22
Payer: COMMERCIAL

## 2023-09-22 DIAGNOSIS — J06.9 ACUTE URI: Primary | ICD-10-CM

## 2023-09-22 PROCEDURE — 99213 OFFICE O/P EST LOW 20 MIN: CPT | Performed by: INTERNAL MEDICINE

## 2023-09-22 PROCEDURE — 1123F ACP DISCUSS/DSCN MKR DOCD: CPT | Performed by: INTERNAL MEDICINE

## 2023-09-22 RX ORDER — AZITHROMYCIN 250 MG/1
250 TABLET, FILM COATED ORAL SEE ADMIN INSTRUCTIONS
Qty: 6 TABLET | Refills: 0 | Status: SHIPPED | OUTPATIENT
Start: 2023-09-22 | End: 2023-09-27

## 2023-09-22 SDOH — ECONOMIC STABILITY: FOOD INSECURITY: WITHIN THE PAST 12 MONTHS, THE FOOD YOU BOUGHT JUST DIDN'T LAST AND YOU DIDN'T HAVE MONEY TO GET MORE.: NEVER TRUE

## 2023-09-22 SDOH — ECONOMIC STABILITY: TRANSPORTATION INSECURITY
IN THE PAST 12 MONTHS, HAS LACK OF TRANSPORTATION KEPT YOU FROM MEETINGS, WORK, OR FROM GETTING THINGS NEEDED FOR DAILY LIVING?: NO

## 2023-09-22 SDOH — ECONOMIC STABILITY: HOUSING INSECURITY
IN THE LAST 12 MONTHS, WAS THERE A TIME WHEN YOU DID NOT HAVE A STEADY PLACE TO SLEEP OR SLEPT IN A SHELTER (INCLUDING NOW)?: NO

## 2023-09-22 SDOH — ECONOMIC STABILITY: FOOD INSECURITY: WITHIN THE PAST 12 MONTHS, YOU WORRIED THAT YOUR FOOD WOULD RUN OUT BEFORE YOU GOT MONEY TO BUY MORE.: NEVER TRUE

## 2023-09-22 SDOH — ECONOMIC STABILITY: INCOME INSECURITY: HOW HARD IS IT FOR YOU TO PAY FOR THE VERY BASICS LIKE FOOD, HOUSING, MEDICAL CARE, AND HEATING?: NOT HARD AT ALL

## 2023-09-22 ASSESSMENT — ENCOUNTER SYMPTOMS
SHORTNESS OF BREATH: 0
CHEST TIGHTNESS: 0
WHEEZING: 0
COUGH: 0
EYE ITCHING: 0
SINUS PRESSURE: 0
BACK PAIN: 0

## 2023-09-22 NOTE — PROGRESS NOTES
Dennise Alonzo is a 67 y.o. male who was seen by synchronous (real-time) audio-video technology on 9/22/2023. Consent:  Services were provided through a video synchronous discussion virtually to substitute for in-person appointment. He and/or his healthcare decision maker is aware that this patient-initiated Telehealth encounter is a billable service, with coverage as determined by his insurance carrier. He is aware that he may receive a bill and has provided verbal consent to proceed: Yes    I was at home while conducting this encounter. Subjective:     Dennise Alonzo was seen for Congestion (Mild chest congestion into 1 day)    Patient is here on this virtual visit with a history of slight chest congestion which started last night around 3 AM.  Patient states he woke up with a feeling of something stuck in his chest almost like his bronchioles were tightening and inflamed. He also states it felt like a mild scratchiness or sore throat. Denies any cough chest congestion or shortness of breath or wheezing. Denies any fever chills or body aches. Has not been exposed to [facility]. Denies any seasonal allergies or sinus issues. Patient states his symptoms quickly go into an infection and he would like to start on an antibiotic if possible. Prior to Admission medications    Medication Sig Start Date End Date Taking? Authorizing Provider   azithromycin (ZITHROMAX) 250 MG tablet Take 1 tablet by mouth See Admin Instructions for 5 days 500mg on day 1 followed by 250mg on days 2 - 5 9/22/23 9/27/23 Yes Tawana Harris MD   OneTouch Delica Lancets 81H MISC USE AS DIRECTED TO CHECK BLOOD SUGAR ONCE DAILY. DX: E11.9 (use brand covered by insurance/Medicare B) 7/18/23   KIMBER Goldberg NP   blood glucose test strips (ONETOUCH ULTRA) strip USE TO CHECK BLOOD SUGAR ONCE DAILY.  DX: E11.9  (use brand covered by insurance/Medicare B) 7/18/23   KIMBER Goldberg NP   metFORMIN (GLUCOPHAGE) 500

## 2023-09-25 DIAGNOSIS — U07.1 COVID-19 VIRUS INFECTION: Primary | ICD-10-CM

## 2023-11-27 ENCOUNTER — PATIENT MESSAGE (OUTPATIENT)
Age: 73
End: 2023-11-27

## 2023-11-27 RX ORDER — GLIPIZIDE 5 MG/1
5 TABLET, FILM COATED, EXTENDED RELEASE ORAL DAILY
Qty: 90 TABLET | Refills: 1 | Status: SHIPPED | OUTPATIENT
Start: 2023-11-27

## 2023-11-27 RX ORDER — GLIPIZIDE 5 MG/1
5 TABLET, FILM COATED, EXTENDED RELEASE ORAL DAILY
Qty: 90 TABLET | Refills: 3 | Status: SHIPPED | OUTPATIENT
Start: 2023-11-27 | End: 2023-11-27

## 2023-11-27 NOTE — TELEPHONE ENCOUNTER
From: Pauly Cohn  To: Dr. Alma Rosa Hines: 11/27/2023 8:16 AM EST  Subject: Glipizide    I ran out of Jardiance and have not gotten more because of the ridiculous uptick in price for a 30-day supply. I asked Dr. Gabbi Goodman if I could sub glipizide for it and she said it was OK. Now I am out of glipizide so I need a refill under the assumption that I would be using two a day insted of one. Unless there is another generic that is even better! Thanks,  DELIA Iraheta

## 2023-12-05 RX ORDER — PRAVASTATIN SODIUM 20 MG
20 TABLET ORAL DAILY
Qty: 90 TABLET | Refills: 0 | Status: SHIPPED | OUTPATIENT
Start: 2023-12-05

## 2023-12-05 NOTE — TELEPHONE ENCOUNTER
PCP: Yue Pearson MD    Last appt: 9/22/2023       No future appointments.     Requested Prescriptions     Pending Prescriptions Disp Refills    pravastatin (PRAVACHOL) 20 MG tablet [Pharmacy Med Name: PRAVASTATIN  TAB 20MG] 90 tablet 3     Sig: TAKE 1 TABLET DAILY       Prior labs and Blood pressures:  BP Readings from Last 3 Encounters:   03/30/23 132/76   12/27/22 136/78   07/26/22 (!) 158/84     Lab Results   Component Value Date/Time     03/30/2023 09:16 AM    K 4.6 03/30/2023 09:16 AM     03/30/2023 09:16 AM    CO2 27 03/30/2023 09:16 AM    BUN 23 03/30/2023 09:16 AM    GFRAA >60 07/26/2022 08:59 AM     Lab Results   Component Value Date/Time    [id number] 6.8 04/15/2022 08:15 AM     Lab Results   Component Value Date/Time    CHOL 150 12/27/2022 12:16 PM    HDL 45 12/27/2022 12:16 PM     No results found for: \"VITD3\", \"VD3RIA\"    Lab Results   Component Value Date/Time    TSH 1.69 09/23/2021 09:00 AM

## 2024-01-18 SDOH — HEALTH STABILITY: PHYSICAL HEALTH: ON AVERAGE, HOW MANY MINUTES DO YOU ENGAGE IN EXERCISE AT THIS LEVEL?: 30 MIN

## 2024-01-18 SDOH — HEALTH STABILITY: PHYSICAL HEALTH: ON AVERAGE, HOW MANY DAYS PER WEEK DO YOU ENGAGE IN MODERATE TO STRENUOUS EXERCISE (LIKE A BRISK WALK)?: 5 DAYS

## 2024-01-18 ASSESSMENT — PATIENT HEALTH QUESTIONNAIRE - PHQ9
1. LITTLE INTEREST OR PLEASURE IN DOING THINGS: 1
SUM OF ALL RESPONSES TO PHQ QUESTIONS 1-9: 2
2. FEELING DOWN, DEPRESSED OR HOPELESS: 1
SUM OF ALL RESPONSES TO PHQ9 QUESTIONS 1 & 2: 2
SUM OF ALL RESPONSES TO PHQ QUESTIONS 1-9: 2

## 2024-01-18 ASSESSMENT — LIFESTYLE VARIABLES
HOW OFTEN DO YOU HAVE A DRINK CONTAINING ALCOHOL: 4
HOW MANY STANDARD DRINKS CONTAINING ALCOHOL DO YOU HAVE ON A TYPICAL DAY: 1 OR 2
HOW OFTEN DO YOU HAVE SIX OR MORE DRINKS ON ONE OCCASION: 1
HOW OFTEN DO YOU HAVE A DRINK CONTAINING ALCOHOL: 2-3 TIMES A WEEK
HOW MANY STANDARD DRINKS CONTAINING ALCOHOL DO YOU HAVE ON A TYPICAL DAY: 1

## 2024-01-25 ENCOUNTER — OFFICE VISIT (OUTPATIENT)
Age: 74
End: 2024-01-25
Payer: MEDICARE

## 2024-01-25 VITALS
HEART RATE: 68 BPM | WEIGHT: 188.4 LBS | RESPIRATION RATE: 14 BRPM | BODY MASS INDEX: 27.91 KG/M2 | OXYGEN SATURATION: 96 % | HEIGHT: 69 IN | SYSTOLIC BLOOD PRESSURE: 110 MMHG | TEMPERATURE: 97.5 F | DIASTOLIC BLOOD PRESSURE: 74 MMHG

## 2024-01-25 DIAGNOSIS — Z23 ENCOUNTER FOR IMMUNIZATION: ICD-10-CM

## 2024-01-25 DIAGNOSIS — N52.9 ERECTILE DYSFUNCTION, UNSPECIFIED ERECTILE DYSFUNCTION TYPE: ICD-10-CM

## 2024-01-25 DIAGNOSIS — D33.3 ACOUSTIC NEUROMA (HCC): ICD-10-CM

## 2024-01-25 DIAGNOSIS — E11.9 TYPE 2 DIABETES MELLITUS WITHOUT COMPLICATION, WITHOUT LONG-TERM CURRENT USE OF INSULIN (HCC): ICD-10-CM

## 2024-01-25 DIAGNOSIS — I10 ESSENTIAL (PRIMARY) HYPERTENSION: ICD-10-CM

## 2024-01-25 DIAGNOSIS — E78.5 HYPERLIPIDEMIA, UNSPECIFIED HYPERLIPIDEMIA TYPE: ICD-10-CM

## 2024-01-25 DIAGNOSIS — Z00.00 ENCOUNTER FOR MEDICARE ANNUAL WELLNESS EXAM: Primary | ICD-10-CM

## 2024-01-25 DIAGNOSIS — Z12.5 PROSTATE CANCER SCREENING: ICD-10-CM

## 2024-01-25 PROCEDURE — 90694 VACC AIIV4 NO PRSRV 0.5ML IM: CPT | Performed by: FAMILY MEDICINE

## 2024-01-25 PROCEDURE — 99214 OFFICE O/P EST MOD 30 MIN: CPT | Performed by: FAMILY MEDICINE

## 2024-01-25 RX ORDER — SILDENAFIL CITRATE 20 MG/1
TABLET ORAL
Qty: 30 TABLET | Refills: 0 | Status: SHIPPED | OUTPATIENT
Start: 2024-01-25

## 2024-01-25 ASSESSMENT — ENCOUNTER SYMPTOMS
DIARRHEA: 0
ABDOMINAL PAIN: 0
SHORTNESS OF BREATH: 0
COUGH: 0
WHEEZING: 0
CONSTIPATION: 0
NAUSEA: 0
CHEST TIGHTNESS: 0

## 2024-01-25 NOTE — PROGRESS NOTES
Chief Complaint   Patient presents with    Medicare AWV     \"Have you been to the ER, urgent care clinic since your last visit?  Hospitalized since your last visit?\"    NO    “Have you seen or consulted any other health care providers outside of [facility] since your last visit?”    NO              Financial Resource Strain: Low Risk  (12/27/2022)    Overall Financial Resource Strain (CARDIA)     Difficulty of Paying Living Expenses: Not hard at all      Food Insecurity: Not on file (9/22/2023)            1/18/2024     4:27 PM   PHQ-9    Little interest or pleasure in doing things 1   Feeling down, depressed, or hopeless 1   PHQ-2 Score 2   PHQ-9 Total Score 2       Health Maintenance Due   Topic Date Due    Respiratory Syncytial Virus (RSV) Pregnant or age 60 yrs+ (1 - 1-dose 60+ series) Never done    Diabetic foot exam  01/12/2018    Diabetic retinal exam  11/02/2019    Shingles vaccine (2 of 2) 09/01/2022    Flu vaccine (1) 08/01/2023    COVID-19 Vaccine (4 - 2023-24 season) 09/01/2023    Annual Wellness Visit (Medicare)  12/28/2023    Diabetic Alb to Cr ratio (uACR) test  12/27/2023    Lipids  12/27/2023             
Medicare Annual Wellness Visit    Joy Iraheta is here for Medicare AWV    Assessment & Plan   Encounter for Medicare annual wellness exam  Type 2 diabetes mellitus without complication, without long-term current use of insulin (HCC)  -     Hemoglobin A1C; Future  -     Microalbumin / Creatinine Urine Ratio; Future  Essential (primary) hypertension  Hyperlipidemia, unspecified hyperlipidemia type  -     CBC; Future  -     Comprehensive Metabolic Panel; Future  -     Lipid Panel; Future  Prostate cancer screening  -     PSA Screening; Future  Acoustic neuroma (HCC)  Erectile dysfunction, unspecified erectile dysfunction type  -     sildenafil (REVATIO) 20 MG tablet; Take 2 to 3 tabs (40 to 60 mg) once by mouth as needed 1 hour before sexual activity. May increase to a maximum dose of 5 tabs or 100 mg once daily if there is incomplete response., Disp-30 tablet, R-0Print  -     Testosterone, free, total; Future  Encounter for immunization  -     Influenza, FLUAD, (age 65 y+), IM, Preservative Free, 0.5 mL     Recommendations for Preventive Services Due: see orders and patient instructions/AVS.  Recommended screening schedule for the next 5-10 years is provided to the patient in written form: see Patient Instructions/AVS.     Return in about 3 months (around 4/25/2024) for DM.     Subjective     Patient's complete Health Risk Assessment and screening values have been reviewed and are found in Flowsheets. The following problems were reviewed today and where indicated follow up appointments were made and/or referrals ordered.    Positive Risk Factor Screenings with Interventions:       Cognitive:      Words recalled: 2 Words Recalled     Total Score Interpretation: Abnormal Mini-Cog  Interventions:  Patient declines any further evaluation or treatment                          Objective   Vitals:    01/25/24 1045   BP: 110/74   Site: Right Upper Arm   Position: Sitting   Cuff Size: Medium Adult   Pulse: 68   Resp: 14 
Conjunctiva/sclera: Conjunctivae normal.      Pupils: Pupils are equal, round, and reactive to light.   Cardiovascular:      Rate and Rhythm: Normal rate and regular rhythm.      Pulses: Normal pulses.      Heart sounds: Normal heart sounds. No murmur heard.     No friction rub. No gallop.   Pulmonary:      Effort: Pulmonary effort is normal. No respiratory distress.      Breath sounds: Normal breath sounds. No stridor. No wheezing, rhonchi or rales.   Musculoskeletal:      Cervical back: Normal range of motion.   Skin:     General: Skin is warm.      Findings: No rash.   Neurological:      General: No focal deficit present.      Mental Status: He is alert and oriented to person, place, and time. Mental status is at baseline.         ASSESSMENT AND PLAN  Joy Iraheta is a 73 y.o. male who presents today for:    1. Encounter for Medicare annual wellness exam    2. Type 2 diabetes mellitus without complication, without long-term current use of insulin (HCC)  Stable, continue current treatment per medication list, pending review of labs.  Would prefer to stick with SGLT2i over glipizide for better glucose control and CV benefit; he will contact insurance to see which other SGLT2i are more affordable and let me know.  - Hemoglobin A1C; Future  - Microalbumin / Creatinine Urine Ratio; Future    3. Essential (primary) hypertension  Stable, continue current treatment per medication list.    4. Hyperlipidemia, unspecified hyperlipidemia type  Will calculate ASCVD risk score pending labs.  - CBC; Future  - Comprehensive Metabolic Panel; Future  - Lipid Panel; Future    5. Prostate cancer screening  - PSA Screening; Future    6. Acoustic neuroma (HCC)  Pt to follow up with NSG.    7. Erectile dysfunction, unspecified erectile dysfunction type  Will check testosterone level per pt request; discussed if abnormal, would defer to urology for management. Can try prn sildenafil for ED.  - sildenafil (REVATIO) 20 MG tablet; Take 2

## 2024-01-31 ENCOUNTER — NURSE ONLY (OUTPATIENT)
Age: 74
End: 2024-01-31

## 2024-01-31 DIAGNOSIS — E78.5 HYPERLIPIDEMIA, UNSPECIFIED HYPERLIPIDEMIA TYPE: ICD-10-CM

## 2024-01-31 DIAGNOSIS — Z12.5 PROSTATE CANCER SCREENING: ICD-10-CM

## 2024-01-31 DIAGNOSIS — E11.9 TYPE 2 DIABETES MELLITUS WITHOUT COMPLICATION, WITHOUT LONG-TERM CURRENT USE OF INSULIN (HCC): ICD-10-CM

## 2024-01-31 DIAGNOSIS — N52.9 ERECTILE DYSFUNCTION, UNSPECIFIED ERECTILE DYSFUNCTION TYPE: ICD-10-CM

## 2024-02-01 LAB
ALBUMIN SERPL-MCNC: 4 G/DL (ref 3.5–5)
ALBUMIN/GLOB SERPL: 1.3 (ref 1.1–2.2)
ALP SERPL-CCNC: 84 U/L (ref 45–117)
ALT SERPL-CCNC: 26 U/L (ref 12–78)
ANION GAP SERPL CALC-SCNC: 5 MMOL/L (ref 5–15)
AST SERPL-CCNC: 12 U/L (ref 15–37)
BILIRUB SERPL-MCNC: 0.5 MG/DL (ref 0.2–1)
BUN SERPL-MCNC: 17 MG/DL (ref 6–20)
BUN/CREAT SERPL: 19 (ref 12–20)
CALCIUM SERPL-MCNC: 9.3 MG/DL (ref 8.5–10.1)
CHLORIDE SERPL-SCNC: 103 MMOL/L (ref 97–108)
CHOLEST SERPL-MCNC: 129 MG/DL
CO2 SERPL-SCNC: 30 MMOL/L (ref 21–32)
CREAT SERPL-MCNC: 0.91 MG/DL (ref 0.7–1.3)
CREAT UR-MCNC: 141 MG/DL
ERYTHROCYTE [DISTWIDTH] IN BLOOD BY AUTOMATED COUNT: 11.9 % (ref 11.5–14.5)
GLOBULIN SER CALC-MCNC: 3 G/DL (ref 2–4)
GLUCOSE SERPL-MCNC: 261 MG/DL (ref 65–100)
HCT VFR BLD AUTO: 43.2 % (ref 36.6–50.3)
HDLC SERPL-MCNC: 44 MG/DL
HDLC SERPL: 2.9 (ref 0–5)
HGB BLD-MCNC: 14.5 G/DL (ref 12.1–17)
LDLC SERPL CALC-MCNC: 66.4 MG/DL (ref 0–100)
MCH RBC QN AUTO: 31.5 PG (ref 26–34)
MCHC RBC AUTO-ENTMCNC: 33.6 G/DL (ref 30–36.5)
MCV RBC AUTO: 93.7 FL (ref 80–99)
MICROALBUMIN UR-MCNC: 1.01 MG/DL
MICROALBUMIN/CREAT UR-RTO: 7 MG/G (ref 0–30)
NRBC # BLD: 0 K/UL (ref 0–0.01)
NRBC BLD-RTO: 0 PER 100 WBC
PLATELET # BLD AUTO: 209 K/UL (ref 150–400)
PMV BLD AUTO: 10.2 FL (ref 8.9–12.9)
POTASSIUM SERPL-SCNC: 4.1 MMOL/L (ref 3.5–5.1)
PROT SERPL-MCNC: 7 G/DL (ref 6.4–8.2)
PSA SERPL-MCNC: 1.5 NG/ML (ref 0.01–4)
RBC # BLD AUTO: 4.61 M/UL (ref 4.1–5.7)
SODIUM SERPL-SCNC: 138 MMOL/L (ref 136–145)
TRIGL SERPL-MCNC: 93 MG/DL
VLDLC SERPL CALC-MCNC: 18.6 MG/DL
WBC # BLD AUTO: 5.9 K/UL (ref 4.1–11.1)

## 2024-02-02 LAB
EST. AVERAGE GLUCOSE BLD GHB EST-MCNC: 206 MG/DL
HBA1C MFR BLD: 8.8 % (ref 4–5.6)

## 2024-02-02 NOTE — ASSESSMENT & PLAN NOTE
This condition is managed by Specialist.  Key Oncology Meds     Patient is on no Oncologic meds. Key Pain Meds     The patient is on no pain meds. Lab Results   Component Value Date/Time    WBC 7.3 03/15/2019 03:15 PM    ABS.  NEUTROPHILS 5.0 03/15/2019 03:15 PM    HGB 13.7 03/15/2019 03:15 PM    HCT 40.0 03/15/2019 03:15 PM    PLATELET 793 12/57/5272 03:15 PM    Creatinine 0.89 03/15/2019 03:15 PM    BUN 13 03/15/2019 03:15 PM    ALT (SGPT) 13 03/15/2019 03:15 PM    AST (SGOT) 13 03/15/2019 03:15 PM    Albumin 4.7 03/15/2019 03:15 PM    Prostate Specific Ag 1.1 03/15/2019 03:15 PM Detail Level: Detailed Quality 226: Preventive Care And Screening: Tobacco Use: Screening And Cessation Intervention: Patient screened for tobacco use and is an ex/non-smoker

## 2024-02-03 LAB — TESTOST SERPL-MCNC: 317 NG/DL (ref 264–916)

## 2024-02-05 ENCOUNTER — PATIENT MESSAGE (OUTPATIENT)
Age: 74
End: 2024-02-05

## 2024-02-08 NOTE — TELEPHONE ENCOUNTER
Called lab [facility]. Name and  verified. They are currently on han and back lock for testosterone test.

## 2024-02-11 LAB
TESTOST FREE SERPL-MCNC: 3.3 PG/ML (ref 6.6–18.1)
TESTOST SERPL-MCNC: 317 NG/DL (ref 264–916)

## 2024-02-20 RX ORDER — PRAVASTATIN SODIUM 20 MG
20 TABLET ORAL DAILY
Qty: 90 TABLET | Refills: 3 | Status: SHIPPED | OUTPATIENT
Start: 2024-02-20

## 2024-02-20 NOTE — TELEPHONE ENCOUNTER
PCP: Anh Dunn MD    Last appt: 1/25/2024       No future appointments.    Requested Prescriptions     Pending Prescriptions Disp Refills    pravastatin (PRAVACHOL) 20 MG tablet [Pharmacy Med Name: PRAVASTATIN  TAB 20MG] 90 tablet 0     Sig: TAKE 1 TABLET DAILY       Prior labs and Blood pressures:  BP Readings from Last 3 Encounters:   01/25/24 110/74   03/30/23 132/76   12/27/22 136/78     Lab Results   Component Value Date/Time     01/31/2024 08:55 AM    K 4.1 01/31/2024 08:55 AM     01/31/2024 08:55 AM    CO2 30 01/31/2024 08:55 AM    BUN 17 01/31/2024 08:55 AM    GFRAA >60 07/26/2022 08:59 AM     Lab Results   Component Value Date/Time    [id number] 6.8 04/15/2022 08:15 AM     Lab Results   Component Value Date/Time    CHOL 129 01/31/2024 08:55 AM    HDL 44 01/31/2024 08:55 AM     No results found for: \"VITD3\", \"VD3RIA\"    Lab Results   Component Value Date/Time    TSH 1.69 09/23/2021 09:00 AM

## 2024-04-04 LAB
LDL CHOLESTEROL, EXTERNAL: 77
PSA, EXTERNAL: 1.6

## 2024-04-11 LAB — HBA1C MFR BLD HPLC: 8.3 %

## 2024-05-13 RX ORDER — LOSARTAN POTASSIUM 100 MG/1
TABLET ORAL
Qty: 90 TABLET | Refills: 1 | Status: SHIPPED | OUTPATIENT
Start: 2024-05-13

## 2024-05-13 NOTE — TELEPHONE ENCOUNTER
PCP: Anh Dunn MD    Last appt: 1/25/2024       Future Appointments   Date Time Provider Department Center   5/20/2024  8:00 AM Anh Dunn MD [facility]       Requested Prescriptions     Pending Prescriptions Disp Refills    losartan (COZAAR) 100 MG tablet [Pharmacy Med Name: LOSARTAN TAB 100MG] 90 tablet 3     Sig: TAKE 1 TABLET DAILY       Prior labs and Blood pressures:  BP Readings from Last 3 Encounters:   01/25/24 110/74   03/30/23 132/76   12/27/22 136/78     Lab Results   Component Value Date/Time     01/31/2024 08:55 AM    K 4.1 01/31/2024 08:55 AM     01/31/2024 08:55 AM    CO2 30 01/31/2024 08:55 AM    BUN 17 01/31/2024 08:55 AM    GFRAA >60 07/26/2022 08:59 AM     Lab Results   Component Value Date/Time    SEA1HKJK 6.8 04/15/2022 08:15 AM     Lab Results   Component Value Date/Time    CHOL 129 01/31/2024 08:55 AM    HDL 44 01/31/2024 08:55 AM    LDL 66.4 01/31/2024 08:55 AM    VLDL 18.6 01/31/2024 08:55 AM     No results found for: \"VITD3\", \"VD3RIA\"    Lab Results   Component Value Date/Time    TSH 1.69 09/23/2021 09:00 AM

## 2024-05-20 ENCOUNTER — OFFICE VISIT (OUTPATIENT)
Age: 74
End: 2024-05-20
Payer: MEDICARE

## 2024-05-20 VITALS
HEART RATE: 62 BPM | OXYGEN SATURATION: 97 % | SYSTOLIC BLOOD PRESSURE: 135 MMHG | TEMPERATURE: 96.9 F | HEIGHT: 69 IN | RESPIRATION RATE: 18 BRPM | DIASTOLIC BLOOD PRESSURE: 96 MMHG | BODY MASS INDEX: 27.22 KG/M2 | WEIGHT: 183.8 LBS

## 2024-05-20 DIAGNOSIS — D33.3 ACOUSTIC NEUROMA (HCC): ICD-10-CM

## 2024-05-20 DIAGNOSIS — I10 HTN, GOAL BELOW 140/90: ICD-10-CM

## 2024-05-20 DIAGNOSIS — E11.9 TYPE 2 DIABETES MELLITUS WITHOUT COMPLICATION, WITHOUT LONG-TERM CURRENT USE OF INSULIN (HCC): Primary | ICD-10-CM

## 2024-05-20 DIAGNOSIS — R35.0 FREQUENCY OF URINATION: ICD-10-CM

## 2024-05-20 DIAGNOSIS — Z71.89 CARDIAC RISK COUNSELING: ICD-10-CM

## 2024-05-20 DIAGNOSIS — E78.5 HYPERLIPIDEMIA, UNSPECIFIED HYPERLIPIDEMIA TYPE: ICD-10-CM

## 2024-05-20 PROBLEM — E11.65 TYPE 2 DIABETES MELLITUS WITH HYPERGLYCEMIA (HCC): Status: ACTIVE | Noted: 2024-05-20

## 2024-05-20 PROCEDURE — 3052F HG A1C>EQUAL 8.0%<EQUAL 9.0%: CPT | Performed by: FAMILY MEDICINE

## 2024-05-20 PROCEDURE — 99214 OFFICE O/P EST MOD 30 MIN: CPT | Performed by: FAMILY MEDICINE

## 2024-05-20 PROCEDURE — G8419 CALC BMI OUT NRM PARAM NOF/U: HCPCS | Performed by: FAMILY MEDICINE

## 2024-05-20 PROCEDURE — 3017F COLORECTAL CA SCREEN DOC REV: CPT | Performed by: FAMILY MEDICINE

## 2024-05-20 PROCEDURE — 3075F SYST BP GE 130 - 139MM HG: CPT | Performed by: FAMILY MEDICINE

## 2024-05-20 PROCEDURE — 2022F DILAT RTA XM EVC RTNOPTHY: CPT | Performed by: FAMILY MEDICINE

## 2024-05-20 PROCEDURE — 3080F DIAST BP >= 90 MM HG: CPT | Performed by: FAMILY MEDICINE

## 2024-05-20 PROCEDURE — 1123F ACP DISCUSS/DSCN MKR DOCD: CPT | Performed by: FAMILY MEDICINE

## 2024-05-20 PROCEDURE — 1036F TOBACCO NON-USER: CPT | Performed by: FAMILY MEDICINE

## 2024-05-20 PROCEDURE — G8427 DOCREV CUR MEDS BY ELIG CLIN: HCPCS | Performed by: FAMILY MEDICINE

## 2024-05-20 RX ORDER — LANCETS 33 GAUGE
EACH MISCELLANEOUS
Qty: 100 EACH | Refills: 3 | Status: SHIPPED | OUTPATIENT
Start: 2024-05-20

## 2024-05-20 RX ORDER — BLOOD SUGAR DIAGNOSTIC
STRIP MISCELLANEOUS
Qty: 100 STRIP | Refills: 3 | Status: SHIPPED | OUTPATIENT
Start: 2024-05-20

## 2024-05-20 ASSESSMENT — PATIENT HEALTH QUESTIONNAIRE - PHQ9
SUM OF ALL RESPONSES TO PHQ QUESTIONS 1-9: 0
SUM OF ALL RESPONSES TO PHQ9 QUESTIONS 1 & 2: 0
1. LITTLE INTEREST OR PLEASURE IN DOING THINGS: NOT AT ALL
SUM OF ALL RESPONSES TO PHQ QUESTIONS 1-9: 0
2. FEELING DOWN, DEPRESSED OR HOPELESS: NOT AT ALL
SUM OF ALL RESPONSES TO PHQ QUESTIONS 1-9: 0
SUM OF ALL RESPONSES TO PHQ QUESTIONS 1-9: 0

## 2024-05-20 ASSESSMENT — ENCOUNTER SYMPTOMS
NAUSEA: 0
DIARRHEA: 0
SHORTNESS OF BREATH: 0
VOMITING: 0
WHEEZING: 0
COUGH: 0
CHEST TIGHTNESS: 0
ABDOMINAL PAIN: 0
CONSTIPATION: 0

## 2024-05-20 NOTE — PROGRESS NOTES
Patient Name: Joy Iraheta   MRN: [id number]    ASSESSMENT AND PLAN  Joy Iraheta is a 73 y.o. male who presents today for:    1. Type 2 diabetes mellitus without complication, without long-term current use of insulin (HCC)  Stable, continue current treatment, pending review of labs.  May increase Jardiance dose; will ask Paloma to see if she could assist with cost savings of Jardiance.  - Hemoglobin A1C; Future  - Hemoglobin A1C    2. HTN, goal below 140/90  Slightly high today; continue to monitor.    3. Acoustic neuroma (HCC)  Stable, continue current treatment.    4. Cardiac risk counseling  Will likely switch statin to atorvastatin/rosuvastatin but will await CT calcium scan results.  - CT CARDIAC CALCIUM SCORING; Future    5. Frequency of urination  Will rule out UTI. Pt declined BPH meds for now.  - Urinalysis with Microscopic; Future  - Culture, Urine; Future  - Urinalysis with Microscopic  - Culture, Urine    6. Hyperlipidemia, unspecified hyperlipidemia type  Will calculate ASCVD risk score pending labs.  Likely will switch to higher intensity statin  - Lipid Panel; Future  - Comprehensive Metabolic Panel; Future  - CBC; Future  - Lipid Panel  - Comprehensive Metabolic Panel  - CBC      Orders Placed This Encounter   Medications    blood glucose test strips (ONETOUCH ULTRA) strip     Sig: USE TO CHECK BLOOD SUGAR ONCE DAILY. DX: E11.9  (use brand covered by insurance/Medicare B)     Dispense:  100 strip     Refill:  3    OneTouch Delica Lancets 33G MISC     Sig: USE AS DIRECTED TO CHECK BLOOD SUGAR ONCE DAILY. DX: E11.9 (use brand covered by insurance/Medicare B)     Dispense:  100 each     Refill:  3        Medications Discontinued During This Encounter   Medication Reason    OneTouch Delica Lancets 33G MISC REORDER    blood glucose test strips (ONETOUCH ULTRA) strip REORDER       Return in about 3 months (around 8/20/2024) for DM.      SUBJECTIVE  Joy Iraheta is a 73 y.o. male who presents with the

## 2024-05-20 NOTE — PROGRESS NOTES
Chief Complaint   Patient presents with    Diabetes     Follow up         \"Have you been to the ER, urgent care clinic since your last visit?  Hospitalized since your last visit?\"    NO    “Have you seen or consulted any other health care providers outside of [facility] since your last visit?”    NO          Click Here for Release of Records Request           5/20/2024     8:08 AM   PHQ-9    Little interest or pleasure in doing things 0   Feeling down, depressed, or hopeless 0   PHQ-2 Score 0   PHQ-9 Total Score 0           Financial Resource Strain: Low Risk  (12/27/2022)    Overall Financial Resource Strain (CARDIA)     Difficulty of Paying Living Expenses: Not hard at all      Food Insecurity: Not on file (9/22/2023)          Health Maintenance Due   Topic Date Due    Respiratory Syncytial Virus (RSV) Pregnant or age 60 yrs+ (1 - 1-dose 60+ series) Never done    Diabetic foot exam  01/12/2018    Shingles vaccine (2 of 2) 09/01/2022    COVID-19 Vaccine (4 - 2023-24 season) 09/01/2023

## 2024-05-21 ENCOUNTER — PATIENT MESSAGE (OUTPATIENT)
Age: 74
End: 2024-05-21

## 2024-05-21 LAB
ALBUMIN SERPL-MCNC: 4.3 G/DL (ref 3.5–5)
ALBUMIN/GLOB SERPL: 1.3 (ref 1.1–2.2)
ALP SERPL-CCNC: 84 U/L (ref 45–117)
ALT SERPL-CCNC: 24 U/L (ref 12–78)
ANION GAP SERPL CALC-SCNC: 5 MMOL/L (ref 5–15)
APPEARANCE UR: CLEAR
AST SERPL-CCNC: 14 U/L (ref 15–37)
BACTERIA URNS QL MICRO: NEGATIVE /HPF
BILIRUB SERPL-MCNC: 0.7 MG/DL (ref 0.2–1)
BILIRUB UR QL: NEGATIVE
BUN SERPL-MCNC: 22 MG/DL (ref 6–20)
BUN/CREAT SERPL: 22 (ref 12–20)
CALCIUM SERPL-MCNC: 9.9 MG/DL (ref 8.5–10.1)
CHLORIDE SERPL-SCNC: 106 MMOL/L (ref 97–108)
CHOLEST SERPL-MCNC: 146 MG/DL
CO2 SERPL-SCNC: 27 MMOL/L (ref 21–32)
COLOR UR: ABNORMAL
CREAT SERPL-MCNC: 0.99 MG/DL (ref 0.7–1.3)
EPITH CASTS URNS QL MICRO: ABNORMAL /LPF
ERYTHROCYTE [DISTWIDTH] IN BLOOD BY AUTOMATED COUNT: 12.2 % (ref 11.5–14.5)
EST. AVERAGE GLUCOSE BLD GHB EST-MCNC: 166 MG/DL
GLOBULIN SER CALC-MCNC: 3.2 G/DL (ref 2–4)
GLUCOSE SERPL-MCNC: 201 MG/DL (ref 65–100)
GLUCOSE UR STRIP.AUTO-MCNC: >1000 MG/DL
HBA1C MFR BLD: 7.4 % (ref 4–5.6)
HCT VFR BLD AUTO: 48.4 % (ref 36.6–50.3)
HDLC SERPL-MCNC: 46 MG/DL
HDLC SERPL: 3.2 (ref 0–5)
HGB BLD-MCNC: 15.9 G/DL (ref 12.1–17)
HGB UR QL STRIP: NEGATIVE
KETONES UR QL STRIP.AUTO: NEGATIVE MG/DL
LDLC SERPL CALC-MCNC: 71.6 MG/DL (ref 0–100)
LEUKOCYTE ESTERASE UR QL STRIP.AUTO: NEGATIVE
MCH RBC QN AUTO: 31.7 PG (ref 26–34)
MCHC RBC AUTO-ENTMCNC: 32.9 G/DL (ref 30–36.5)
MCV RBC AUTO: 96.4 FL (ref 80–99)
NITRITE UR QL STRIP.AUTO: NEGATIVE
NRBC # BLD: 0 K/UL (ref 0–0.01)
NRBC BLD-RTO: 0 PER 100 WBC
PH UR STRIP: 5.5 (ref 5–8)
PLATELET # BLD AUTO: 212 K/UL (ref 150–400)
PMV BLD AUTO: 10.2 FL (ref 8.9–12.9)
POTASSIUM SERPL-SCNC: 4.7 MMOL/L (ref 3.5–5.1)
PROT SERPL-MCNC: 7.5 G/DL (ref 6.4–8.2)
PROT UR STRIP-MCNC: NEGATIVE MG/DL
RBC # BLD AUTO: 5.02 M/UL (ref 4.1–5.7)
RBC #/AREA URNS HPF: ABNORMAL /HPF (ref 0–5)
SODIUM SERPL-SCNC: 138 MMOL/L (ref 136–145)
SP GR UR REFRACTOMETRY: 1.01 (ref 1–1.03)
TRIGL SERPL-MCNC: 142 MG/DL
UROBILINOGEN UR QL STRIP.AUTO: 0.2 EU/DL (ref 0.2–1)
VLDLC SERPL CALC-MCNC: 28.4 MG/DL
WBC # BLD AUTO: 6.2 K/UL (ref 4.1–11.1)
WBC URNS QL MICRO: ABNORMAL /HPF (ref 0–4)

## 2024-05-21 NOTE — TELEPHONE ENCOUNTER
Call and spoke to patient, two ID confirmed.   Writer informed patient that we have discount card. Patient stated he will be in to pick it up at front.

## 2024-05-22 ENCOUNTER — TELEPHONE (OUTPATIENT)
Dept: PRIMARY CARE CLINIC | Facility: CLINIC | Age: 74
End: 2024-05-22

## 2024-05-22 LAB
BACTERIA SPEC CULT: NORMAL
SERVICE CMNT-IMP: NORMAL

## 2024-05-22 NOTE — TELEPHONE ENCOUNTER
Pharmacy Progress Note     Pt contacted this writer with question about cost of DM meds.  He does not qualify for Rx assistance.    Pt also had questions about Glycine and creatinine supplements.  Completed review of literature and sent message back to pt via [employer] message.    There are no discontinued medications.  No orders of the defined types were placed in this encounter.        Paloma Irizarry, PharmD, [facility]  Clinical Pharmacist Specialist      For Pharmacy Admin Tracking Only    Program: Medical Group  CPA in place:  Yes  Recommendation Provided To: Patient/Caregiver: 1 via Telephone  Intervention Detail: Patient Access Assistance/Sample Provided  Intervention Accepted By: Patient/Caregiver: 1  Time Spent (min):  120

## 2024-05-22 NOTE — TELEPHONE ENCOUNTER
Pharmacy Progress Note - Telephone Call    Mr. Joy Iraheta 73 y.o. was contacted via an outbound telephone call regarding scheduling PharmD visit - cost of Jardiance today.  A voicemail was left for patient to return my call.  This writer's work mobile number was provided as a callback contact - [phone].      Paloma Irizarry, PharmD, [facility]  Clinical Pharmacist Specialist        For Pharmacy Admin Tracking Only    Program: Medical Group  CPA in place:  Yes  Recommendation Provided To: Patient/Caregiver: 0 via Telephone and [employer] Message  Intervention Accepted By: Patient/Caregiver: 0  Time Spent (min): 5

## 2024-06-13 ENCOUNTER — HOSPITAL ENCOUNTER (OUTPATIENT)
Facility: HOSPITAL | Age: 74
Discharge: HOME | End: 2024-06-13

## 2024-06-13 DIAGNOSIS — Z71.89 CARDIAC RISK COUNSELING: ICD-10-CM

## 2024-06-13 PROCEDURE — 75571 CT HRT W/O DYE W/CA TEST: CPT

## 2024-06-19 ENCOUNTER — PATIENT MESSAGE (OUTPATIENT)
Age: 74
End: 2024-06-19

## 2024-06-20 RX ORDER — ATORVASTATIN CALCIUM 10 MG/1
10 TABLET, FILM COATED ORAL DAILY
Qty: 90 TABLET | Refills: 3 | Status: SHIPPED | OUTPATIENT
Start: 2024-06-20

## 2024-07-17 RX ORDER — EMPAGLIFLOZIN 10 MG/1
10 TABLET, FILM COATED ORAL DAILY
Qty: 30 TABLET | Refills: 14 | OUTPATIENT
Start: 2024-07-17

## 2024-08-14 RX ORDER — GLIPIZIDE 5 MG/1
5 TABLET, FILM COATED, EXTENDED RELEASE ORAL DAILY
Qty: 90 TABLET | Refills: 1 | Status: SHIPPED | OUTPATIENT
Start: 2024-08-14

## 2024-08-14 NOTE — TELEPHONE ENCOUNTER
PCP: Anh Dunn MD    Last appt: 5/20/2024       No future appointments.    Requested Prescriptions     Pending Prescriptions Disp Refills    glipiZIDE (GLUCOTROL XL) 5 MG extended release tablet [Pharmacy Med Name: GLIPIZIDE ER 5 MG TABLET] 90 tablet 1     Sig: TAKE 1 TABLET BY MOUTH EVERY DAY       Prior labs and Blood pressures:  BP Readings from Last 3 Encounters:   05/20/24 (!) 135/96   01/25/24 110/74   03/30/23 132/76     Lab Results   Component Value Date/Time     05/20/2024 08:42 AM    K 4.7 05/20/2024 08:42 AM     05/20/2024 08:42 AM    CO2 27 05/20/2024 08:42 AM    BUN 22 05/20/2024 08:42 AM    GFRAA >60 07/26/2022 08:59 AM     Lab Results   Component Value Date/Time    [id number] 6.8 04/15/2022 08:15 AM     Lab Results   Component Value Date/Time    CHOL 146 05/20/2024 08:42 AM    HDL 46 05/20/2024 08:42 AM    LDL 71.6 05/20/2024 08:42 AM    LDL 66.4 01/31/2024 08:55 AM    VLDL 28.4 05/20/2024 08:42 AM     No results found for: \"VITD3\"    Lab Results   Component Value Date/Time    TSH 1.69 09/23/2021 09:00 AM

## 2024-09-19 ENCOUNTER — OFFICE VISIT (OUTPATIENT)
Age: 74
End: 2024-09-19
Payer: MEDICARE

## 2024-09-19 VITALS
DIASTOLIC BLOOD PRESSURE: 71 MMHG | HEART RATE: 80 BPM | WEIGHT: 181 LBS | SYSTOLIC BLOOD PRESSURE: 122 MMHG | HEIGHT: 69 IN | TEMPERATURE: 97.3 F | RESPIRATION RATE: 16 BRPM | BODY MASS INDEX: 26.81 KG/M2 | OXYGEN SATURATION: 96 %

## 2024-09-19 DIAGNOSIS — J06.9 VIRAL URI: Primary | ICD-10-CM

## 2024-09-19 PROCEDURE — 3078F DIAST BP <80 MM HG: CPT | Performed by: NURSE PRACTITIONER

## 2024-09-19 PROCEDURE — 1036F TOBACCO NON-USER: CPT | Performed by: NURSE PRACTITIONER

## 2024-09-19 PROCEDURE — 99213 OFFICE O/P EST LOW 20 MIN: CPT | Performed by: NURSE PRACTITIONER

## 2024-09-19 PROCEDURE — 3074F SYST BP LT 130 MM HG: CPT | Performed by: NURSE PRACTITIONER

## 2024-09-19 PROCEDURE — G8419 CALC BMI OUT NRM PARAM NOF/U: HCPCS | Performed by: NURSE PRACTITIONER

## 2024-09-19 PROCEDURE — 1123F ACP DISCUSS/DSCN MKR DOCD: CPT | Performed by: NURSE PRACTITIONER

## 2024-09-19 PROCEDURE — G8427 DOCREV CUR MEDS BY ELIG CLIN: HCPCS | Performed by: NURSE PRACTITIONER

## 2024-09-19 PROCEDURE — 3017F COLORECTAL CA SCREEN DOC REV: CPT | Performed by: NURSE PRACTITIONER

## 2024-09-19 ASSESSMENT — PATIENT HEALTH QUESTIONNAIRE - PHQ9
SUM OF ALL RESPONSES TO PHQ QUESTIONS 1-9: 0
SUM OF ALL RESPONSES TO PHQ9 QUESTIONS 1 & 2: 0
SUM OF ALL RESPONSES TO PHQ QUESTIONS 1-9: 0
2. FEELING DOWN, DEPRESSED OR HOPELESS: NOT AT ALL
1. LITTLE INTEREST OR PLEASURE IN DOING THINGS: NOT AT ALL
SUM OF ALL RESPONSES TO PHQ QUESTIONS 1-9: 0
SUM OF ALL RESPONSES TO PHQ QUESTIONS 1-9: 0

## 2024-09-19 ASSESSMENT — ENCOUNTER SYMPTOMS
SINUS PRESSURE: 0
CHEST TIGHTNESS: 0
SHORTNESS OF BREATH: 0
COUGH: 1
SINUS PAIN: 0
WHEEZING: 0
SORE THROAT: 0

## 2024-11-10 RX ORDER — LOSARTAN POTASSIUM 100 MG/1
TABLET ORAL
Qty: 90 TABLET | Refills: 1 | Status: SHIPPED | OUTPATIENT
Start: 2024-11-10

## 2025-01-10 RX ORDER — GLIPIZIDE 5 MG/1
5 TABLET, FILM COATED, EXTENDED RELEASE ORAL DAILY
Qty: 90 TABLET | Refills: 0 | Status: SHIPPED | OUTPATIENT
Start: 2025-01-10 | End: 2025-03-07

## 2025-01-10 NOTE — TELEPHONE ENCOUNTER
PCP: Anh Dunn MD    Last appt: 9/19/2024       No future appointments.    Requested Prescriptions     Pending Prescriptions Disp Refills    glipiZIDE (GLUCOTROL XL) 5 MG extended release tablet [Pharmacy Med Name: GLIPIZIDE XL TAB 5MG] 90 tablet 3     Sig: TAKE 1 TABLET DAILY.       Prior labs and Blood pressures:  BP Readings from Last 3 Encounters:   09/19/24 122/71   05/20/24 (!) 135/96   01/25/24 110/74     Lab Results   Component Value Date/Time     05/20/2024 08:42 AM    K 4.7 05/20/2024 08:42 AM     05/20/2024 08:42 AM    CO2 27 05/20/2024 08:42 AM    BUN 22 05/20/2024 08:42 AM    GFRAA >60 07/26/2022 08:59 AM     Lab Results   Component Value Date/Time    [id number] 6.8 04/15/2022 08:15 AM     Lab Results   Component Value Date/Time    CHOL 146 05/20/2024 08:42 AM    HDL 46 05/20/2024 08:42 AM    LDL 71.6 05/20/2024 08:42 AM    LDL 66.4 01/31/2024 08:55 AM    VLDL 28.4 05/20/2024 08:42 AM     No results found for: \"VITD3\"    Lab Results   Component Value Date/Time    TSH 1.69 09/23/2021 09:00 AM

## 2025-01-10 NOTE — TELEPHONE ENCOUNTER
Called Patient. Name and  confirmed.  Scheduled DM follow up appt on 24 at 8 AM. Verbalized understanding.

## 2025-01-20 SDOH — ECONOMIC STABILITY: FOOD INSECURITY: WITHIN THE PAST 12 MONTHS, THE FOOD YOU BOUGHT JUST DIDN'T LAST AND YOU DIDN'T HAVE MONEY TO GET MORE.: NEVER TRUE

## 2025-01-20 SDOH — ECONOMIC STABILITY: INCOME INSECURITY: IN THE LAST 12 MONTHS, WAS THERE A TIME WHEN YOU WERE NOT ABLE TO PAY THE MORTGAGE OR RENT ON TIME?: NO

## 2025-01-20 SDOH — ECONOMIC STABILITY: FOOD INSECURITY: WITHIN THE PAST 12 MONTHS, YOU WORRIED THAT YOUR FOOD WOULD RUN OUT BEFORE YOU GOT MONEY TO BUY MORE.: NEVER TRUE

## 2025-01-23 ENCOUNTER — OFFICE VISIT (OUTPATIENT)
Age: 75
End: 2025-01-23
Payer: MEDICARE

## 2025-01-23 VITALS
BODY MASS INDEX: 26.98 KG/M2 | HEIGHT: 69 IN | WEIGHT: 182.2 LBS | DIASTOLIC BLOOD PRESSURE: 70 MMHG | HEART RATE: 73 BPM | OXYGEN SATURATION: 96 % | RESPIRATION RATE: 18 BRPM | SYSTOLIC BLOOD PRESSURE: 122 MMHG | TEMPERATURE: 97.3 F

## 2025-01-23 DIAGNOSIS — E11.9 TYPE 2 DIABETES MELLITUS WITHOUT COMPLICATION, WITHOUT LONG-TERM CURRENT USE OF INSULIN (HCC): ICD-10-CM

## 2025-01-23 DIAGNOSIS — Z23 ENCOUNTER FOR IMMUNIZATION: ICD-10-CM

## 2025-01-23 DIAGNOSIS — Z12.5 PROSTATE CANCER SCREENING: ICD-10-CM

## 2025-01-23 DIAGNOSIS — D33.3 ACOUSTIC NEUROMA (HCC): ICD-10-CM

## 2025-01-23 DIAGNOSIS — I10 PRIMARY HYPERTENSION: ICD-10-CM

## 2025-01-23 DIAGNOSIS — Z00.00 ENCOUNTER FOR MEDICARE ANNUAL WELLNESS EXAM: Primary | ICD-10-CM

## 2025-01-23 DIAGNOSIS — E78.5 HYPERLIPIDEMIA, UNSPECIFIED HYPERLIPIDEMIA TYPE: ICD-10-CM

## 2025-01-23 PROCEDURE — 99213 OFFICE O/P EST LOW 20 MIN: CPT | Performed by: FAMILY MEDICINE

## 2025-01-23 PROCEDURE — 90653 IIV ADJUVANT VACCINE IM: CPT | Performed by: FAMILY MEDICINE

## 2025-01-23 ASSESSMENT — PATIENT HEALTH QUESTIONNAIRE - PHQ9
1. LITTLE INTEREST OR PLEASURE IN DOING THINGS: NOT AT ALL
SUM OF ALL RESPONSES TO PHQ QUESTIONS 1-9: 0
SUM OF ALL RESPONSES TO PHQ9 QUESTIONS 1 & 2: 0
SUM OF ALL RESPONSES TO PHQ QUESTIONS 1-9: 0
2. FEELING DOWN, DEPRESSED OR HOPELESS: NOT AT ALL

## 2025-01-23 ASSESSMENT — LIFESTYLE VARIABLES
HOW OFTEN DO YOU HAVE A DRINK CONTAINING ALCOHOL: 2-3 TIMES A WEEK
HOW MANY STANDARD DRINKS CONTAINING ALCOHOL DO YOU HAVE ON A TYPICAL DAY: 1 OR 2

## 2025-01-23 NOTE — PROGRESS NOTES
Chief Complaint   Patient presents with    Medicare AWV     \"Have you been to the ER, urgent care clinic since your last visit?  Hospitalized since your last visit?\"    NO    “Have you seen or consulted any other health care providers outside of [facility] since your last visit?”    NO      Financial Resource Strain: Low Risk  (12/27/2022)    Overall Financial Resource Strain (CARDIA)     Difficulty of Paying Living Expenses: Not hard at all      Food Insecurity: No Food Insecurity (1/20/2025)    Hunger Vital Sign     Worried About Running Out of Food in the Last Year: Never true     Ran Out of Food in the Last Year: Never true            1/23/2025     8:50 AM   PHQ-9    Little interest or pleasure in doing things 0   Feeling down, depressed, or hopeless 0   PHQ-2 Score 0   PHQ-9 Total Score 0       Health Maintenance Due   Topic Date Due    Diabetic foot exam  01/12/2018    Shingles vaccine (2 of 2) 09/01/2022    Flu vaccine (1) 08/01/2024    COVID-19 Vaccine (4 - 2023-24 season) 09/01/2024    Respiratory Syncytial Virus (RSV) Pregnant or age 60 yrs+ (1 - 1-dose 75+ series) Never done    Diabetic retinal exam  01/16/2025    Diabetic Alb to Cr ratio (uACR) test  01/31/2025

## 2025-01-23 NOTE — PROGRESS NOTES
Medicare Annual Wellness Visit    Joy Iraheta is here for Medicare AWV    Assessment & Plan   Encounter for Medicare annual wellness exam  Acoustic neuroma (HCC)  Type 2 diabetes mellitus without complication, without long-term current use of insulin (HCC)  -     Hemoglobin A1C; Future  -     Albumin/Creatinine Ratio, Urine; Future  Hyperlipidemia, unspecified hyperlipidemia type  -     CBC; Future  -     Comprehensive Metabolic Panel; Future  -     Lipid Panel; Future  Prostate cancer screening  -     PSA Screening; Future  Primary hypertension  Encounter for immunization  -     Influenza, FLUAD Trivalent, (age 65 y+), IM, Preservative Free, 0.5mL     Recommendations for Preventive Services Due: see orders and patient instructions/AVS.  Recommended screening schedule for the next 5-10 years is provided to the patient in written form: see Patient Instructions/AVS.     Return in about 3 months (around 4/23/2025) for DM.     Subjective     Patient's complete Health Risk Assessment and screening values have been reviewed and are found in Flowsheets. The following problems were reviewed today and where indicated follow up appointments were made and/or referrals ordered.    Positive Risk Factor Screenings with Interventions:     Cognitive:      Words recalled: 2 Words Recalled     Total Score Interpretation: Abnormal Mini-Cog  Interventions:  Patient declines any further evaluation or treatment                            Objective   Vitals:    01/23/25 0902   BP: 122/70   Site: Right Upper Arm   Position: Sitting   Cuff Size: Medium Adult   Pulse: 73   Resp: 18   Temp: 97.3 °F (36.3 °C)   TempSrc: Temporal   SpO2: 96%   Weight: 82.6 kg (182 lb 3.2 oz)   Height: 1.753 m (5' 9\")      Body mass index is 26.91 kg/m².     Patient-Reported Vitals  No data recorded         Allergies   Allergen Reactions    Iodinated Contrast Media Nausea Only     Shrimp   Shrimp    Iodine Nausea Only     Shrimp     Prior to Visit Medications

## 2025-01-23 NOTE — PROGRESS NOTES
Patient Name: Joy Iraheta   MRN: [id number]    ASSESSMENT AND PLAN  Joy Iraheta is a 74 y.o. male who presents today for:    1. Encounter for Medicare annual wellness exam    2. Acoustic neuroma (HCC)  Stable, continue follow up with neurosurgery.    3. Type 2 diabetes mellitus without complication, without long-term current use of insulin (HCC)  Stable, continue current treatment, pending review of labs.  - Hemoglobin A1C; Future  - Albumin/Creatinine Ratio, Urine; Future  - Albumin/Creatinine Ratio, Urine  - Hemoglobin A1C    4. Hyperlipidemia, unspecified hyperlipidemia type  Will calculate ASCVD risk score pending labs.  - CBC; Future  - Comprehensive Metabolic Panel; Future  - Lipid Panel; Future  - Lipid Panel  - Comprehensive Metabolic Panel  - CBC    5. Prostate cancer screening  - PSA Screening; Future  - PSA Screening    6. Primary hypertension  Stable, continue current treatment.    7. Encounter for immunization  - Influenza, FLUAD Trivalent, (age 65 y+), IM, Preservative Free, 0.5mL      No orders of the defined types were placed in this encounter.       Medications Discontinued During This Encounter   Medication Reason    pravastatin (PRAVACHOL) 20 MG tablet ERROR    Multiple Vitamin (MVI, CELEBRATE, CHEWABLE TABLET) ERROR       Return in about 3 months (around 4/23/2025) for DM.      SUBJECTIVE  Joy Iraheta is a 74 y.o. male who presents with the following:     Colon Cancer Screening: up to date.  PSA: no fhx of prostate or personal hx of prostate issues; pt would like PSA  Lab Results   Component Value Date/Time    PSA 1.5 01/31/2024 08:55 AM    PSA 1.3 07/26/2022 08:59 AM    PSA 1.1 09/23/2021 09:00 AM    PSA 1.2 03/16/2020 08:36 AM     CAD risk factors:  HTN: wnl on meds  BP Readings from Last 3 Encounters:   01/23/25 122/70   09/19/24 122/71   05/20/24 (!) 135/96     Lipid: switched pravastatin to atorvastatin for better CV risk reduction. CAC score was zero last year.  Lab Results   Component

## 2025-01-24 LAB
ALBUMIN SERPL-MCNC: 4.4 G/DL (ref 3.5–5)
ALBUMIN/GLOB SERPL: 1.3 (ref 1.1–2.2)
ALP SERPL-CCNC: 89 U/L (ref 45–117)
ALT SERPL-CCNC: 27 U/L (ref 12–78)
ANION GAP SERPL CALC-SCNC: 7 MMOL/L (ref 2–12)
AST SERPL-CCNC: 14 U/L (ref 15–37)
BILIRUB SERPL-MCNC: 0.6 MG/DL (ref 0.2–1)
BUN SERPL-MCNC: 22 MG/DL (ref 6–20)
BUN/CREAT SERPL: 21 (ref 12–20)
CALCIUM SERPL-MCNC: 10.1 MG/DL (ref 8.5–10.1)
CHLORIDE SERPL-SCNC: 104 MMOL/L (ref 97–108)
CHOLEST SERPL-MCNC: 146 MG/DL
CO2 SERPL-SCNC: 26 MMOL/L (ref 21–32)
CREAT SERPL-MCNC: 1.06 MG/DL (ref 0.7–1.3)
CREAT UR-MCNC: 61.7 MG/DL
ERYTHROCYTE [DISTWIDTH] IN BLOOD BY AUTOMATED COUNT: 12 % (ref 11.5–14.5)
EST. AVERAGE GLUCOSE BLD GHB EST-MCNC: 171 MG/DL
GLOBULIN SER CALC-MCNC: 3.4 G/DL (ref 2–4)
GLUCOSE SERPL-MCNC: 189 MG/DL (ref 65–100)
HBA1C MFR BLD: 7.6 % (ref 4–5.6)
HCT VFR BLD AUTO: 49.5 % (ref 36.6–50.3)
HDLC SERPL-MCNC: 52 MG/DL
HDLC SERPL: 2.8 (ref 0–5)
HGB BLD-MCNC: 16.5 G/DL (ref 12.1–17)
LDLC SERPL CALC-MCNC: 59.8 MG/DL (ref 0–100)
MCH RBC QN AUTO: 31.6 PG (ref 26–34)
MCHC RBC AUTO-ENTMCNC: 33.3 G/DL (ref 30–36.5)
MCV RBC AUTO: 94.8 FL (ref 80–99)
MICROALBUMIN UR-MCNC: 0.76 MG/DL
MICROALBUMIN/CREAT UR-RTO: 12 MG/G (ref 0–30)
NRBC # BLD: 0 K/UL (ref 0–0.01)
NRBC BLD-RTO: 0 PER 100 WBC
PLATELET # BLD AUTO: 211 K/UL (ref 150–400)
PMV BLD AUTO: 10.5 FL (ref 8.9–12.9)
POTASSIUM SERPL-SCNC: 3.9 MMOL/L (ref 3.5–5.1)
PROT SERPL-MCNC: 7.8 G/DL (ref 6.4–8.2)
PSA SERPL-MCNC: 1.8 NG/ML (ref 0.01–4)
RBC # BLD AUTO: 5.22 M/UL (ref 4.1–5.7)
SODIUM SERPL-SCNC: 137 MMOL/L (ref 136–145)
TRIGL SERPL-MCNC: 171 MG/DL
VLDLC SERPL CALC-MCNC: 34.2 MG/DL
WBC # BLD AUTO: 7.3 K/UL (ref 4.1–11.1)

## 2025-03-07 RX ORDER — GLIPIZIDE 5 MG/1
5 TABLET, FILM COATED, EXTENDED RELEASE ORAL DAILY
Qty: 90 TABLET | Refills: 3 | Status: SHIPPED | OUTPATIENT
Start: 2025-03-07

## 2025-03-07 RX ORDER — LOSARTAN POTASSIUM 100 MG/1
100 TABLET ORAL DAILY
Qty: 90 TABLET | Refills: 3 | Status: SHIPPED | OUTPATIENT
Start: 2025-03-07

## 2025-03-07 RX ORDER — ATORVASTATIN CALCIUM 10 MG/1
10 TABLET, FILM COATED ORAL DAILY
Qty: 90 TABLET | Refills: 3 | Status: SHIPPED | OUTPATIENT
Start: 2025-03-07

## 2025-03-07 NOTE — TELEPHONE ENCOUNTER
PCP: Anh Dunn MD    Last appt: 1/23/2025       No future appointments.    Requested Prescriptions     Pending Prescriptions Disp Refills    losartan (COZAAR) 100 MG tablet [Pharmacy Med Name: Losartan Potassium 100 MG Oral Tablet] 90 tablet 3     Sig: TAKE 1 TABLET BY MOUTH DAILY    metFORMIN (GLUCOPHAGE) 500 MG tablet [Pharmacy Med Name: metFORMIN HCl 500 MG Oral Tablet] 360 tablet 3     Sig: TAKE 2 TABLETS BY MOUTH TWICE  DAILY WITH MEALS    glipiZIDE (GLUCOTROL XL) 5 MG extended release tablet [Pharmacy Med Name: glipiZIDE ER 5 MG Oral Tablet Extended Release 24 Hour] 90 tablet 3     Sig: TAKE 1 TABLET BY MOUTH DAILY    atorvastatin (LIPITOR) 10 MG tablet [Pharmacy Med Name: Atorvastatin Calcium 10 MG Oral Tablet] 90 tablet 3     Sig: TAKE 1 TABLET BY MOUTH DAILY       Prior labs and Blood pressures:  BP Readings from Last 3 Encounters:   01/23/25 122/70   09/19/24 122/71   05/20/24 (!) 135/96     Lab Results   Component Value Date/Time     01/23/2025 09:38 AM    K 3.9 01/23/2025 09:38 AM     01/23/2025 09:38 AM    CO2 26 01/23/2025 09:38 AM    BUN 22 01/23/2025 09:38 AM    GFRAA >60 07/26/2022 08:59 AM     Lab Results   Component Value Date/Time    MSA8CMMB 6.8 04/15/2022 08:15 AM     Lab Results   Component Value Date/Time    CHOL 146 01/23/2025 09:38 AM    HDL 52 01/23/2025 09:38 AM    LDL 59.8 01/23/2025 09:38 AM    LDL 66.4 01/31/2024 08:55 AM    VLDL 34.2 01/23/2025 09:38 AM     No results found for: \"VITD3\"    Lab Results   Component Value Date/Time    TSH 1.69 09/23/2021 09:00 AM

## 2025-03-18 RX ORDER — LANCETS 33 GAUGE
EACH MISCELLANEOUS
Qty: 100 EACH | Refills: 3 | Status: SHIPPED | OUTPATIENT
Start: 2025-03-18

## 2025-03-18 RX ORDER — BLOOD SUGAR DIAGNOSTIC
STRIP MISCELLANEOUS
Qty: 100 STRIP | Refills: 3 | Status: SHIPPED | OUTPATIENT
Start: 2025-03-18

## 2025-03-18 NOTE — TELEPHONE ENCOUNTER
PCP: Anh Dunn MD    Last appt: 1/23/2025       No future appointments.    Requested Prescriptions     Pending Prescriptions Disp Refills    blood glucose test strips (ONETOUCH ULTRA) strip 100 strip 3     Sig: USE TO CHECK BLOOD SUGAR ONCE DAILY. DX: E11.9  (use brand covered by insurance/Medicare B)    OneTouch Delica Lancets 33G MISC 100 each 3     Sig: USE AS DIRECTED TO CHECK BLOOD SUGAR ONCE DAILY. DX: E11.9 (use brand covered by insurance/Medicare B)       Prior labs and Blood pressures:  BP Readings from Last 3 Encounters:   01/23/25 122/70   09/19/24 122/71   05/20/24 (!) 135/96     Lab Results   Component Value Date/Time     01/23/2025 09:38 AM    K 3.9 01/23/2025 09:38 AM     01/23/2025 09:38 AM    CO2 26 01/23/2025 09:38 AM    BUN 22 01/23/2025 09:38 AM    GFRAA >60 07/26/2022 08:59 AM     Lab Results   Component Value Date/Time    [id number] 6.8 04/15/2022 08:15 AM     Lab Results   Component Value Date/Time    CHOL 146 01/23/2025 09:38 AM    HDL 52 01/23/2025 09:38 AM    LDL 59.8 01/23/2025 09:38 AM    LDL 66.4 01/31/2024 08:55 AM    VLDL 34.2 01/23/2025 09:38 AM     No results found for: \"VITD3\"    Lab Results   Component Value Date/Time    TSH 1.69 09/23/2021 09:00 AM

## 2025-06-02 ENCOUNTER — PATIENT MESSAGE (OUTPATIENT)
Age: 75
End: 2025-06-02

## 2025-06-02 NOTE — TELEPHONE ENCOUNTER
PCP: Anh Dunn MD    Last appt: 1/23/2025       No future appointments.    Requested Prescriptions      No prescriptions requested or ordered in this encounter       Prior labs and Blood pressures:  BP Readings from Last 3 Encounters:   01/23/25 122/70   09/19/24 122/71   05/20/24 (!) 135/96     Lab Results   Component Value Date/Time     01/23/2025 09:38 AM    K 3.9 01/23/2025 09:38 AM     01/23/2025 09:38 AM    CO2 26 01/23/2025 09:38 AM    BUN 22 01/23/2025 09:38 AM    GFRAA >60 07/26/2022 08:59 AM     Lab Results   Component Value Date/Time    [id number] 6.8 04/15/2022 08:15 AM     Lab Results   Component Value Date/Time    CHOL 146 01/23/2025 09:38 AM    HDL 52 01/23/2025 09:38 AM    LDL 59.8 01/23/2025 09:38 AM    LDL 66.4 01/31/2024 08:55 AM    VLDL 34.2 01/23/2025 09:38 AM     No results found for: \"VITD3\"    Lab Results   Component Value Date/Time    TSH 1.69 09/23/2021 09:00 AM

## 2025-08-01 ENCOUNTER — OFFICE VISIT (OUTPATIENT)
Age: 75
End: 2025-08-01
Payer: MEDICARE

## 2025-08-01 VITALS
TEMPERATURE: 96.9 F | WEIGHT: 179.4 LBS | HEIGHT: 69 IN | OXYGEN SATURATION: 97 % | DIASTOLIC BLOOD PRESSURE: 76 MMHG | BODY MASS INDEX: 26.57 KG/M2 | RESPIRATION RATE: 16 BRPM | SYSTOLIC BLOOD PRESSURE: 134 MMHG | HEART RATE: 72 BPM

## 2025-08-01 DIAGNOSIS — N40.1 BENIGN LOCALIZED PROSTATIC HYPERPLASIA WITH LOWER URINARY TRACT SYMPTOMS (LUTS): ICD-10-CM

## 2025-08-01 DIAGNOSIS — M25.512 LEFT SHOULDER PAIN, UNSPECIFIED CHRONICITY: Primary | ICD-10-CM

## 2025-08-01 DIAGNOSIS — E11.9 TYPE 2 DIABETES MELLITUS WITHOUT COMPLICATION, WITHOUT LONG-TERM CURRENT USE OF INSULIN (HCC): ICD-10-CM

## 2025-08-01 DIAGNOSIS — I10 PRIMARY HYPERTENSION: ICD-10-CM

## 2025-08-01 DIAGNOSIS — E78.5 HYPERLIPIDEMIA, UNSPECIFIED HYPERLIPIDEMIA TYPE: ICD-10-CM

## 2025-08-01 PROCEDURE — 1159F MED LIST DOCD IN RCRD: CPT | Performed by: FAMILY MEDICINE

## 2025-08-01 PROCEDURE — 1125F AMNT PAIN NOTED PAIN PRSNT: CPT | Performed by: FAMILY MEDICINE

## 2025-08-01 PROCEDURE — 3078F DIAST BP <80 MM HG: CPT | Performed by: FAMILY MEDICINE

## 2025-08-01 PROCEDURE — G8419 CALC BMI OUT NRM PARAM NOF/U: HCPCS | Performed by: FAMILY MEDICINE

## 2025-08-01 PROCEDURE — 99214 OFFICE O/P EST MOD 30 MIN: CPT | Performed by: FAMILY MEDICINE

## 2025-08-01 PROCEDURE — 1123F ACP DISCUSS/DSCN MKR DOCD: CPT | Performed by: FAMILY MEDICINE

## 2025-08-01 PROCEDURE — 3051F HG A1C>EQUAL 7.0%<8.0%: CPT | Performed by: FAMILY MEDICINE

## 2025-08-01 PROCEDURE — G2211 COMPLEX E/M VISIT ADD ON: HCPCS | Performed by: FAMILY MEDICINE

## 2025-08-01 PROCEDURE — 3017F COLORECTAL CA SCREEN DOC REV: CPT | Performed by: FAMILY MEDICINE

## 2025-08-01 PROCEDURE — 3075F SYST BP GE 130 - 139MM HG: CPT | Performed by: FAMILY MEDICINE

## 2025-08-01 PROCEDURE — G8427 DOCREV CUR MEDS BY ELIG CLIN: HCPCS | Performed by: FAMILY MEDICINE

## 2025-08-01 PROCEDURE — 1036F TOBACCO NON-USER: CPT | Performed by: FAMILY MEDICINE

## 2025-08-01 PROCEDURE — 2022F DILAT RTA XM EVC RTNOPTHY: CPT | Performed by: FAMILY MEDICINE

## 2025-08-01 RX ORDER — TAMSULOSIN HYDROCHLORIDE 0.4 MG/1
0.4 CAPSULE ORAL DAILY
Qty: 30 CAPSULE | Refills: 5 | Status: SHIPPED | OUTPATIENT
Start: 2025-08-01

## 2025-08-01 ASSESSMENT — PATIENT HEALTH QUESTIONNAIRE - PHQ9
SUM OF ALL RESPONSES TO PHQ QUESTIONS 1-9: 0
2. FEELING DOWN, DEPRESSED OR HOPELESS: NOT AT ALL
SUM OF ALL RESPONSES TO PHQ QUESTIONS 1-9: 0
1. LITTLE INTEREST OR PLEASURE IN DOING THINGS: NOT AT ALL

## 2025-08-01 NOTE — ASSESSMENT & PLAN NOTE
Chronic, not at goal (unstable), advised patient to continue this medication for another month so that the next lab check is a reflection of 3 months on the higher Jardiance dose.  Advised him to inquire with his insurance if CGM tyrone would be covered.    Orders:    Hemoglobin A1C; Future

## 2025-08-01 NOTE — PROGRESS NOTES
Chief Complaint   Patient presents with    Referral - General     PT referral for shoulder pain     Follow-up     \"Have you been to the ER, urgent care clinic since your last visit?  Hospitalized since your last visit?\"    NO    “Have you seen or consulted any other health care providers outside of [facility] since your last visit?”    Yes. Neurosurgeon- Dr. curran     “Have you had a colorectal cancer screening such as a colonoscopy/FIT/Cologuard?    NO    Date of last Colonoscopy: 7/15/2020  No cologuard on file  No FIT/FOBT on file   No flexible sigmoidoscopy on file               Financial Resource Strain: Low Risk  (12/27/2022)    Overall Financial Resource Strain (CARDIA)     Difficulty of Paying Living Expenses: Not hard at all      Food Insecurity: No Food Insecurity (1/20/2025)    Hunger Vital Sign     Worried About Running Out of Food in the Last Year: Never true     Ran Out of Food in the Last Year: Never true        Click Here for Release of Records Request         8/1/2025     2:09 PM   PHQ-9    Little interest or pleasure in doing things 0   Feeling down, depressed, or hopeless 0   PHQ-2 Score 0   PHQ-9 Total Score 0       Health Maintenance Due   Topic Date Due    Diabetic foot exam  01/12/2018    Shingles vaccine (2 of 2) 09/01/2022    COVID-19 Vaccine (4 - 2024-25 season) 09/01/2024    Respiratory Syncytial Virus (RSV) Pregnant or age 60 yrs+ (1 - 1-dose 75+ series) Never done    DTaP/Tdap/Td vaccine (2 - Td or Tdap) 05/21/2025    Flu vaccine (1) 08/01/2025    Colorectal Cancer Screen  07/15/2025

## 2025-08-01 NOTE — PROGRESS NOTES
Patient Name: Joy Iraheta   MRN: [id number]    ASSESSMENT AND PLAN  Joy Iraheta is a 74 y.o. male who presents today for:    Assessment & Plan  Left shoulder pain, unspecified chronicity  Suspect possibly Tatar cuff dysfunction.  Will refer to physical therapy.    Orders:    [facility] - Physical Therapy at [facility]    Benign localized prostatic hyperplasia with lower urinary tract symptoms (LUTS)   Chronic, not at goal (unstable), will start trial of Flomax.    Orders:    tamsulosin (FLOMAX) 0.4 MG capsule; Take 1 capsule by mouth daily    Type 2 diabetes mellitus without complication, without long-term current use of insulin (HCC)   Chronic, not at goal (unstable), advised patient to continue this medication for another month so that the next lab check is a reflection of 3 months on the higher Jardiance dose.  Advised him to inquire with his insurance if CGM tyrone would be covered.    Orders:    Hemoglobin A1C; Future    Hyperlipidemia, unspecified hyperlipidemia type   Chronic, at goal (stable), continue current plan pending work up below    Orders:    CBC; Future    Comprehensive Metabolic Panel; Future    Lipid Panel; Future    Primary hypertension   Chronic, at goal (stable), continue current treatment plan           Orders Placed This Encounter   Medications    tamsulosin (FLOMAX) 0.4 MG capsule     Sig: Take 1 capsule by mouth daily     Dispense:  30 capsule     Refill:  5        There are no discontinued medications.    Return in about 4 months (around 12/1/2025) for DM.      SUBJECTIVE  Joy Iraheta is a 74 y.o. male who presents with the following:     Reports several week history of left shoulder pain.  Unsure if he injured it.  He is left-hand dominant.  Would like to start physical therapy.  Ibuprofen and Tylenol has somewhat been helpful.  Since last visit, we did increase Jardiance to 25 mg but has not quite taken it for full 3 months yet.  Fasting sugars can vary between 133-180.

## 2025-08-07 ENCOUNTER — HOSPITAL ENCOUNTER (OUTPATIENT)
Facility: HOSPITAL | Age: 75
Setting detail: RECURRING SERIES
Discharge: HOME | End: 2025-08-10
Payer: MEDICARE

## 2025-08-07 PROCEDURE — 97110 THERAPEUTIC EXERCISES: CPT

## 2025-08-07 PROCEDURE — 97161 PT EVAL LOW COMPLEX 20 MIN: CPT

## 2025-08-12 ENCOUNTER — HOSPITAL ENCOUNTER (OUTPATIENT)
Facility: HOSPITAL | Age: 75
Setting detail: RECURRING SERIES
Discharge: HOME | End: 2025-08-15
Payer: MEDICARE

## 2025-08-12 PROCEDURE — 97140 MANUAL THERAPY 1/> REGIONS: CPT

## 2025-08-12 PROCEDURE — 97110 THERAPEUTIC EXERCISES: CPT

## 2025-08-14 ENCOUNTER — HOSPITAL ENCOUNTER (OUTPATIENT)
Facility: HOSPITAL | Age: 75
Setting detail: RECURRING SERIES
Discharge: HOME | End: 2025-08-17
Payer: MEDICARE

## 2025-08-14 PROCEDURE — 97110 THERAPEUTIC EXERCISES: CPT

## 2025-08-14 PROCEDURE — 97140 MANUAL THERAPY 1/> REGIONS: CPT

## 2025-08-26 ENCOUNTER — HOSPITAL ENCOUNTER (OUTPATIENT)
Facility: HOSPITAL | Age: 75
Setting detail: RECURRING SERIES
Discharge: HOME | End: 2025-08-29
Payer: MEDICARE

## 2025-08-26 PROCEDURE — 97110 THERAPEUTIC EXERCISES: CPT

## 2025-08-26 PROCEDURE — 97140 MANUAL THERAPY 1/> REGIONS: CPT

## 2025-08-28 ENCOUNTER — HOSPITAL ENCOUNTER (OUTPATIENT)
Facility: HOSPITAL | Age: 75
Setting detail: RECURRING SERIES
Discharge: HOME | End: 2025-08-31
Payer: MEDICARE

## 2025-08-28 PROCEDURE — 97110 THERAPEUTIC EXERCISES: CPT

## 2025-08-28 PROCEDURE — 97140 MANUAL THERAPY 1/> REGIONS: CPT
